# Patient Record
Sex: MALE | Race: WHITE | Employment: UNEMPLOYED
[De-identification: names, ages, dates, MRNs, and addresses within clinical notes are randomized per-mention and may not be internally consistent; named-entity substitution may affect disease eponyms.]

---

## 2017-03-02 ENCOUNTER — TELEPHONE (OUTPATIENT)
Dept: FAMILY MEDICINE CLINIC | Facility: CLINIC | Age: 47
End: 2017-03-02

## 2017-03-08 ENCOUNTER — TELEPHONE (OUTPATIENT)
Dept: FAMILY MEDICINE CLINIC | Facility: CLINIC | Age: 47
End: 2017-03-08

## 2017-04-27 ENCOUNTER — OFFICE VISIT (OUTPATIENT)
Dept: FAMILY MEDICINE CLINIC | Age: 47
End: 2017-04-27
Payer: COMMERCIAL

## 2017-04-27 VITALS
WEIGHT: 232 LBS | BODY MASS INDEX: 33.29 KG/M2 | HEART RATE: 91 BPM | DIASTOLIC BLOOD PRESSURE: 88 MMHG | SYSTOLIC BLOOD PRESSURE: 148 MMHG | OXYGEN SATURATION: 97 %

## 2017-04-27 DIAGNOSIS — E11.9 TYPE 2 DIABETES MELLITUS WITHOUT COMPLICATION, UNSPECIFIED LONG TERM INSULIN USE STATUS: Primary | ICD-10-CM

## 2017-04-27 DIAGNOSIS — F11.23 OPIOID DEPENDENCE WITH WITHDRAWAL (HCC): ICD-10-CM

## 2017-04-27 DIAGNOSIS — F41.9 ANXIETY AND DEPRESSION: ICD-10-CM

## 2017-04-27 DIAGNOSIS — F32.A ANXIETY AND DEPRESSION: ICD-10-CM

## 2017-04-27 LAB
CREATININE URINE: 100 MG/DL
HBA1C MFR BLD: 9.3 %
MICROALBUMIN/CREAT 24H UR: 2 MG/G{CREAT}

## 2017-04-27 PROCEDURE — 83036 HEMOGLOBIN GLYCOSYLATED A1C: CPT

## 2017-04-27 PROCEDURE — 99214 OFFICE O/P EST MOD 30 MIN: CPT

## 2017-04-27 RX ORDER — HYDROXYZINE HYDROCHLORIDE 25 MG/1
TABLET, FILM COATED ORAL
Qty: 60 TABLET | Refills: 1 | Status: SHIPPED | OUTPATIENT
Start: 2017-04-27 | End: 2021-01-22

## 2017-04-27 ASSESSMENT — ENCOUNTER SYMPTOMS
DIARRHEA: 0
WHEEZING: 0
EYE REDNESS: 0
EYE DISCHARGE: 0
VOMITING: 0
SORE THROAT: 0
COUGH: 0
ABDOMINAL PAIN: 0
RHINORRHEA: 0
NAUSEA: 0
SHORTNESS OF BREATH: 0

## 2017-05-12 ENCOUNTER — OFFICE VISIT (OUTPATIENT)
Dept: FAMILY MEDICINE CLINIC | Age: 47
End: 2017-05-12
Payer: COMMERCIAL

## 2017-05-12 VITALS
WEIGHT: 233 LBS | OXYGEN SATURATION: 96 % | HEIGHT: 72 IN | BODY MASS INDEX: 31.56 KG/M2 | TEMPERATURE: 97.7 F | HEART RATE: 78 BPM | SYSTOLIC BLOOD PRESSURE: 146 MMHG | DIASTOLIC BLOOD PRESSURE: 98 MMHG

## 2017-05-12 DIAGNOSIS — F11.23 OPIOID DEPENDENCE WITH WITHDRAWAL (HCC): ICD-10-CM

## 2017-05-12 DIAGNOSIS — F17.200 SMOKER: ICD-10-CM

## 2017-05-12 DIAGNOSIS — E11.9 TYPE 2 DIABETES MELLITUS WITHOUT COMPLICATION, UNSPECIFIED LONG TERM INSULIN USE STATUS: Primary | ICD-10-CM

## 2017-05-12 DIAGNOSIS — F32.A ANXIETY AND DEPRESSION: ICD-10-CM

## 2017-05-12 DIAGNOSIS — F41.9 ANXIETY AND DEPRESSION: ICD-10-CM

## 2017-05-12 PROCEDURE — 99213 OFFICE O/P EST LOW 20 MIN: CPT

## 2017-05-12 ASSESSMENT — PATIENT HEALTH QUESTIONNAIRE - PHQ9
2. FEELING DOWN, DEPRESSED OR HOPELESS: 0
1. LITTLE INTEREST OR PLEASURE IN DOING THINGS: 0
SUM OF ALL RESPONSES TO PHQ QUESTIONS 1-9: 0
SUM OF ALL RESPONSES TO PHQ9 QUESTIONS 1 & 2: 0

## 2017-05-12 ASSESSMENT — ENCOUNTER SYMPTOMS
ABDOMINAL PAIN: 0
EYE REDNESS: 0
EYE DISCHARGE: 0
RHINORRHEA: 0
COUGH: 0
SORE THROAT: 0
WHEEZING: 0
SHORTNESS OF BREATH: 0
NAUSEA: 0
DIARRHEA: 0
VOMITING: 0

## 2017-12-06 ENCOUNTER — TELEPHONE (OUTPATIENT)
Dept: OTHER | Facility: CLINIC | Age: 47
End: 2017-12-06

## 2021-01-22 ENCOUNTER — APPOINTMENT (OUTPATIENT)
Dept: CT IMAGING | Age: 51
DRG: 074 | End: 2021-01-22
Payer: COMMERCIAL

## 2021-01-22 ENCOUNTER — HOSPITAL ENCOUNTER (INPATIENT)
Age: 51
LOS: 1 days | Discharge: HOME | DRG: 074 | End: 2021-01-23
Attending: EMERGENCY MEDICINE | Admitting: FAMILY MEDICINE
Payer: COMMERCIAL

## 2021-01-22 DIAGNOSIS — R29.810 FACIAL DROOP: Primary | ICD-10-CM

## 2021-01-22 PROBLEM — F17.210 DEPENDENCE ON NICOTINE FROM CIGARETTES: Status: ACTIVE | Noted: 2021-01-22

## 2021-01-22 PROBLEM — I10 ESSENTIAL HYPERTENSION: Status: ACTIVE | Noted: 2021-01-22

## 2021-01-22 LAB
ABSOLUTE EOS #: 0.2 K/UL (ref 0–0.4)
ABSOLUTE IMMATURE GRANULOCYTE: ABNORMAL K/UL (ref 0–0.3)
ABSOLUTE LYMPH #: 3.1 K/UL (ref 1–4.8)
ABSOLUTE MONO #: 0.8 K/UL (ref 0.1–1.3)
ANION GAP SERPL CALCULATED.3IONS-SCNC: 15 MMOL/L (ref 9–17)
BASOPHILS # BLD: 1 % (ref 0–2)
BASOPHILS ABSOLUTE: 0.1 K/UL (ref 0–0.2)
BUN BLDV-MCNC: 14 MG/DL (ref 6–20)
BUN/CREAT BLD: ABNORMAL (ref 9–20)
CALCIUM SERPL-MCNC: 9 MG/DL (ref 8.6–10.4)
CHLORIDE BLD-SCNC: 97 MMOL/L (ref 98–107)
CO2: 23 MMOL/L (ref 20–31)
CREAT SERPL-MCNC: 0.59 MG/DL (ref 0.7–1.2)
DIFFERENTIAL TYPE: ABNORMAL
EOSINOPHILS RELATIVE PERCENT: 2 % (ref 0–4)
GFR AFRICAN AMERICAN: >60 ML/MIN
GFR NON-AFRICAN AMERICAN: >60 ML/MIN
GFR SERPL CREATININE-BSD FRML MDRD: ABNORMAL ML/MIN/{1.73_M2}
GFR SERPL CREATININE-BSD FRML MDRD: ABNORMAL ML/MIN/{1.73_M2}
GLUCOSE BLD-MCNC: 220 MG/DL (ref 75–110)
GLUCOSE BLD-MCNC: 274 MG/DL (ref 70–99)
GLUCOSE BLD-MCNC: 279 MG/DL (ref 75–110)
HCT VFR BLD CALC: 43.4 % (ref 41–53)
HEMOGLOBIN: 14.6 G/DL (ref 13.5–17.5)
IMMATURE GRANULOCYTES: ABNORMAL %
INR BLD: 0.9
LYMPHOCYTES # BLD: 32 % (ref 24–44)
MCH RBC QN AUTO: 30.7 PG (ref 26–34)
MCHC RBC AUTO-ENTMCNC: 33.6 G/DL (ref 31–37)
MCV RBC AUTO: 91.1 FL (ref 80–100)
MONOCYTES # BLD: 8 % (ref 1–7)
NRBC AUTOMATED: ABNORMAL PER 100 WBC
PARTIAL THROMBOPLASTIN TIME: 30.1 SEC (ref 24–36)
PDW BLD-RTO: 12.9 % (ref 11.5–14.9)
PLATELET # BLD: 253 K/UL (ref 150–450)
PLATELET ESTIMATE: ABNORMAL
PMV BLD AUTO: 7.8 FL (ref 6–12)
POTASSIUM SERPL-SCNC: 4.3 MMOL/L (ref 3.7–5.3)
PROTHROMBIN TIME: 11.7 SEC (ref 11.8–14.6)
RBC # BLD: 4.76 M/UL (ref 4.5–5.9)
RBC # BLD: ABNORMAL 10*6/UL
SEG NEUTROPHILS: 57 % (ref 36–66)
SEGMENTED NEUTROPHILS ABSOLUTE COUNT: 5.7 K/UL (ref 1.3–9.1)
SODIUM BLD-SCNC: 135 MMOL/L (ref 135–144)
TROPONIN INTERP: NORMAL
TROPONIN T: NORMAL NG/ML
TROPONIN, HIGH SENSITIVITY: 6 NG/L (ref 0–22)
WBC # BLD: 9.9 K/UL (ref 3.5–11)
WBC # BLD: ABNORMAL 10*3/UL

## 2021-01-22 PROCEDURE — 83036 HEMOGLOBIN GLYCOSYLATED A1C: CPT

## 2021-01-22 PROCEDURE — 36415 COLL VENOUS BLD VENIPUNCTURE: CPT

## 2021-01-22 PROCEDURE — 6360000002 HC RX W HCPCS: Performed by: STUDENT IN AN ORGANIZED HEALTH CARE EDUCATION/TRAINING PROGRAM

## 2021-01-22 PROCEDURE — 70498 CT ANGIOGRAPHY NECK: CPT

## 2021-01-22 PROCEDURE — 80048 BASIC METABOLIC PNL TOTAL CA: CPT

## 2021-01-22 PROCEDURE — 85025 COMPLETE CBC W/AUTO DIFF WBC: CPT

## 2021-01-22 PROCEDURE — 85730 THROMBOPLASTIN TIME PARTIAL: CPT

## 2021-01-22 PROCEDURE — 2580000003 HC RX 258: Performed by: STUDENT IN AN ORGANIZED HEALTH CARE EDUCATION/TRAINING PROGRAM

## 2021-01-22 PROCEDURE — 99285 EMERGENCY DEPT VISIT HI MDM: CPT

## 2021-01-22 PROCEDURE — 6370000000 HC RX 637 (ALT 250 FOR IP): Performed by: STUDENT IN AN ORGANIZED HEALTH CARE EDUCATION/TRAINING PROGRAM

## 2021-01-22 PROCEDURE — 2580000003 HC RX 258: Performed by: EMERGENCY MEDICINE

## 2021-01-22 PROCEDURE — 85610 PROTHROMBIN TIME: CPT

## 2021-01-22 PROCEDURE — 70450 CT HEAD/BRAIN W/O DYE: CPT

## 2021-01-22 PROCEDURE — 80307 DRUG TEST PRSMV CHEM ANLYZR: CPT

## 2021-01-22 PROCEDURE — 84484 ASSAY OF TROPONIN QUANT: CPT

## 2021-01-22 PROCEDURE — 93005 ELECTROCARDIOGRAM TRACING: CPT | Performed by: STUDENT IN AN ORGANIZED HEALTH CARE EDUCATION/TRAINING PROGRAM

## 2021-01-22 PROCEDURE — 6360000004 HC RX CONTRAST MEDICATION: Performed by: EMERGENCY MEDICINE

## 2021-01-22 PROCEDURE — 82947 ASSAY GLUCOSE BLOOD QUANT: CPT

## 2021-01-22 PROCEDURE — 2060000000 HC ICU INTERMEDIATE R&B

## 2021-01-22 RX ORDER — ONDANSETRON 2 MG/ML
4 INJECTION INTRAMUSCULAR; INTRAVENOUS EVERY 6 HOURS PRN
Status: DISCONTINUED | OUTPATIENT
Start: 2021-01-22 | End: 2021-01-23 | Stop reason: HOSPADM

## 2021-01-22 RX ORDER — ACETAMINOPHEN 325 MG/1
650 TABLET ORAL EVERY 6 HOURS PRN
Status: DISCONTINUED | OUTPATIENT
Start: 2021-01-22 | End: 2021-01-23 | Stop reason: HOSPADM

## 2021-01-22 RX ORDER — CLOPIDOGREL BISULFATE 75 MG/1
75 TABLET ORAL ONCE
Status: COMPLETED | OUTPATIENT
Start: 2021-01-22 | End: 2021-01-22

## 2021-01-22 RX ORDER — DEXTROSE MONOHYDRATE 25 G/50ML
12.5 INJECTION, SOLUTION INTRAVENOUS PRN
Status: DISCONTINUED | OUTPATIENT
Start: 2021-01-22 | End: 2021-01-23 | Stop reason: HOSPADM

## 2021-01-22 RX ORDER — METHADONE HYDROCHLORIDE 10 MG/5ML
120 SOLUTION ORAL DAILY
COMMUNITY

## 2021-01-22 RX ORDER — ACETAMINOPHEN 325 MG/1
650 TABLET ORAL EVERY 4 HOURS PRN
Status: DISCONTINUED | OUTPATIENT
Start: 2021-01-22 | End: 2021-01-23 | Stop reason: HOSPADM

## 2021-01-22 RX ORDER — DEXTROSE MONOHYDRATE 50 MG/ML
100 INJECTION, SOLUTION INTRAVENOUS PRN
Status: DISCONTINUED | OUTPATIENT
Start: 2021-01-22 | End: 2021-01-23 | Stop reason: HOSPADM

## 2021-01-22 RX ORDER — ASPIRIN 81 MG/1
81 TABLET ORAL DAILY
Status: DISCONTINUED | OUTPATIENT
Start: 2021-01-23 | End: 2021-01-23 | Stop reason: HOSPADM

## 2021-01-22 RX ORDER — SODIUM CHLORIDE 0.9 % (FLUSH) 0.9 %
10 SYRINGE (ML) INJECTION PRN
Status: DISCONTINUED | OUTPATIENT
Start: 2021-01-22 | End: 2021-01-23 | Stop reason: HOSPADM

## 2021-01-22 RX ORDER — ACETAMINOPHEN 650 MG/1
650 SUPPOSITORY RECTAL EVERY 6 HOURS PRN
Status: DISCONTINUED | OUTPATIENT
Start: 2021-01-22 | End: 2021-01-23 | Stop reason: HOSPADM

## 2021-01-22 RX ORDER — NICOTINE POLACRILEX 4 MG
15 LOZENGE BUCCAL PRN
Status: DISCONTINUED | OUTPATIENT
Start: 2021-01-22 | End: 2021-01-23 | Stop reason: HOSPADM

## 2021-01-22 RX ORDER — SODIUM CHLORIDE 0.9 % (FLUSH) 0.9 %
10 SYRINGE (ML) INJECTION EVERY 12 HOURS SCHEDULED
Status: DISCONTINUED | OUTPATIENT
Start: 2021-01-22 | End: 2021-01-23 | Stop reason: HOSPADM

## 2021-01-22 RX ORDER — PROMETHAZINE HYDROCHLORIDE 25 MG/1
12.5 TABLET ORAL EVERY 6 HOURS PRN
Status: DISCONTINUED | OUTPATIENT
Start: 2021-01-22 | End: 2021-01-23 | Stop reason: HOSPADM

## 2021-01-22 RX ORDER — ASPIRIN 81 MG/1
81 TABLET, CHEWABLE ORAL DAILY
Status: DISCONTINUED | OUTPATIENT
Start: 2021-01-23 | End: 2021-01-22 | Stop reason: SDUPTHER

## 2021-01-22 RX ORDER — SODIUM CHLORIDE 0.9 % (FLUSH) 0.9 %
10 SYRINGE (ML) INJECTION ONCE
Status: COMPLETED | OUTPATIENT
Start: 2021-01-22 | End: 2021-01-22

## 2021-01-22 RX ORDER — CLOPIDOGREL BISULFATE 75 MG/1
300 TABLET ORAL ONCE
Status: COMPLETED | OUTPATIENT
Start: 2021-01-22 | End: 2021-01-22

## 2021-01-22 RX ORDER — 0.9 % SODIUM CHLORIDE 0.9 %
80 INTRAVENOUS SOLUTION INTRAVENOUS ONCE
Status: COMPLETED | OUTPATIENT
Start: 2021-01-22 | End: 2021-01-22

## 2021-01-22 RX ORDER — POLYETHYLENE GLYCOL 3350 17 G/17G
17 POWDER, FOR SOLUTION ORAL DAILY PRN
Status: DISCONTINUED | OUTPATIENT
Start: 2021-01-22 | End: 2021-01-23 | Stop reason: HOSPADM

## 2021-01-22 RX ORDER — AMLODIPINE BESYLATE 5 MG/1
5 TABLET ORAL DAILY
COMMUNITY

## 2021-01-22 RX ORDER — NICOTINE 21 MG/24HR
1 PATCH, TRANSDERMAL 24 HOURS TRANSDERMAL DAILY
Status: DISCONTINUED | OUTPATIENT
Start: 2021-01-22 | End: 2021-01-23 | Stop reason: HOSPADM

## 2021-01-22 RX ORDER — INSULIN GLARGINE 100 [IU]/ML
50 INJECTION, SOLUTION SUBCUTANEOUS 2 TIMES DAILY
Status: DISCONTINUED | OUTPATIENT
Start: 2021-01-22 | End: 2021-01-23 | Stop reason: HOSPADM

## 2021-01-22 RX ORDER — ASPIRIN 81 MG/1
324 TABLET, CHEWABLE ORAL ONCE
Status: COMPLETED | OUTPATIENT
Start: 2021-01-22 | End: 2021-01-22

## 2021-01-22 RX ORDER — ATORVASTATIN CALCIUM 40 MG/1
40 TABLET, FILM COATED ORAL NIGHTLY
Status: DISCONTINUED | OUTPATIENT
Start: 2021-01-22 | End: 2021-01-22

## 2021-01-22 RX ORDER — ATORVASTATIN CALCIUM 80 MG/1
80 TABLET, FILM COATED ORAL NIGHTLY
Status: DISCONTINUED | OUTPATIENT
Start: 2021-01-22 | End: 2021-01-23 | Stop reason: HOSPADM

## 2021-01-22 RX ADMIN — SODIUM CHLORIDE 80 ML: 9 INJECTION, SOLUTION INTRAVENOUS at 18:41

## 2021-01-22 RX ADMIN — ATORVASTATIN CALCIUM 80 MG: 80 TABLET, FILM COATED ORAL at 23:10

## 2021-01-22 RX ADMIN — Medication 10 ML: at 18:41

## 2021-01-22 RX ADMIN — INSULIN LISPRO 2 UNITS: 100 INJECTION, SOLUTION INTRAVENOUS; SUBCUTANEOUS at 23:11

## 2021-01-22 RX ADMIN — Medication 10 ML: at 23:24

## 2021-01-22 RX ADMIN — CLOPIDOGREL BISULFATE 75 MG: 75 TABLET ORAL at 23:10

## 2021-01-22 RX ADMIN — ENOXAPARIN SODIUM 30 MG: 30 INJECTION SUBCUTANEOUS at 23:10

## 2021-01-22 RX ADMIN — INSULIN GLARGINE 50 UNITS: 100 INJECTION, SOLUTION SUBCUTANEOUS at 23:11

## 2021-01-22 RX ADMIN — ASPIRIN 324 MG: 81 TABLET, CHEWABLE ORAL at 20:24

## 2021-01-22 RX ADMIN — CLOPIDOGREL BISULFATE 300 MG: 75 TABLET ORAL at 20:25

## 2021-01-22 RX ADMIN — IOPAMIDOL 75 ML: 755 INJECTION, SOLUTION INTRAVENOUS at 18:41

## 2021-01-22 ASSESSMENT — ENCOUNTER SYMPTOMS
WHEEZING: 0
CONSTIPATION: 0
BACK PAIN: 0
NAUSEA: 0
ABDOMINAL PAIN: 0
COUGH: 0
SHORTNESS OF BREATH: 0
PHOTOPHOBIA: 0
VOMITING: 0
DIARRHEA: 0

## 2021-01-22 NOTE — Clinical Note
Patient Class: Inpatient [101]   REQUIRED: Diagnosis: Facial droop [745235]   Estimated Length of Stay: Estimated stay of more than 2 midnights   Admitting Provider: Kaitlin Winter [[id number]]   Telemetry Bed Required?: Yes

## 2021-01-22 NOTE — ED PROVIDER NOTES
EMERGENCY DEPARTMENT ENCOUNTER   ATTENDING ATTESTATION     Pt Name: Wally Naranjo  MRN: [id number]  [place] 1970  Date of evaluation: 1/22/21       Wally Naranjo is a 54 y.o. male who presents with Facial Droop      MDM: 51-year-old male presents complaint of right-sided facial droop. Patient states symptoms started approximately 4 PM yesterday, states he noticed a strange feeling in the right side of his lip and noticed that he had some drooping of the right side of his face. Patient states that the symptoms have continued and he presents for evaluation today. Patient denies any other numbness, tingling or weakness in the upper or lower extremities, denies any recent injuries or recent head trauma, denies symptoms like this before. On initial exam patient does have right-sided facial droop with sparing of the forehead, remaining neuro exam was unremarkable, initial NIH of 2 for partial right-sided facial droop, will obtain stroke work-up    Reviewed unremarkable, CTs were negative for acute process    Given patient's presenting symptoms, will admit for further stroke work-up    Discussed results with patient and patient's mother, both are agreeable to admission    Spoke with Dr. Ileana Oliveira who accepts admission with Neurology consult. Patient demonstrates understanding and agreement with the plan, was given the opportunity to ask questions, and these questions were answered to the best of the provided information at this time. VS stable for transfer. This dictation was prepared using [employer] [facility] Digital [employer] voice recognition software. As a result, errors may have occurred. When identified, these errors have been corrected.  While every attempt is made to correct errors in dictation, errors may still exist.         Vitals:   Vitals:    01/22/21 1644 01/22/21 1715 01/22/21 1730 01/22/21 1746   BP: (!) 173/95 (!) 160/99 (!) 153/99    Pulse: 86 76 72    Resp: 18 9 11    Temp: 98 °F (36.7 °C)      TempSrc: Oral      SpO2: 95% 93% 94%    Weight:    230 lb (104.3 kg)         I personally evaluated and examined the patient in conjunction with the resident and agree with the assessment, treatment plan, and disposition of the patient as recorded by the resident. I performed a history and physical examination of the patient and discussed management with the resident. I reviewed the residents note and agree with the documented findings and plan of care. Any areas of disagreement are noted on the chart. I was personally present for the key portions of any procedures. I have documented in the chart those procedures where I was not present during the key portions. I have personally reviewed all images and agree with the resident's interpretation. I have reviewed the emergency nurses triage note. I agree with the chief complaint, past medical history, past surgical history, allergies, medications, social and family history as documented unless otherwise noted.     Nely Driscoll DO  Attending Emergency Physician            Nely Driscoll DO  01/22/21 1448

## 2021-01-22 NOTE — PROGRESS NOTES
Medication History completed:    New medications: amlodipine, Lantus, sertraline    Medications discontinued: hydroxyzine, liraglutide, metformin    Changes to dosing:  Methadone changed to 120 mg daily    Stated allergies: NKDA    Other pertinent information: verified medications with [facility] pharmacy. Patient states that he is taking 120 mg of methadone daily. Was unable to verify methadone dose with [facility] on [address]. Will need to verify tomorrow when they reopen.     Thank you,  Prince Carver, PharmD candidate [id number]

## 2021-01-22 NOTE — ED TRIAGE NOTES
Mode of arrival (squad #, walk in, police, etc) : Walk In        Chief complaint(s): Facial droop         Arrival Note (brief scenario, treatment PTA, etc). : Pt arrives to ED c/o facial droop. Patient states that yesterday around 1600 he noticed the right side of his mouth was drooping. C= \"Have you ever felt that you should Cut down on your drinking? \"  No  A= \"Have people Annoyed you by criticizing your drinking? \"  No  G= \"Have you ever felt bad or Guilty about your drinking? \"  No  E= \"Have you ever had a drink as an Eye-opener first thing in the morning to steady your nerves or to help a hangover? \"  No      Deferred []      Reason for deferring: N/A    *If yes to two or more: probable alcohol abuse. *

## 2021-01-22 NOTE — ED PROVIDER NOTES
[facility] CARE  Emergency Department Encounter  EmergencyMedicine Resident     Pt Name:Buster Carrillo  MRN: [id number]  [place] 1970  Date of evaluation: 1/22/21  PCP:  Carla Brown       Chief Complaint   Patient presents with    Facial Droop       HISTORY OF PRESENT ILLNESS  (Location/Symptom, Timing/Onset, Context/Setting, Quality, Duration, Modifying Factors, Severity.)    This patient was evaluated in the Emergency Department for symptoms described in the history of present illness. He/she was evaluated in the context of the global COVID-19 pandemic, which necessitated consideration that the patient might be at risk for infection with the SARS-CoV-2 virus that causes COVID-19. Institutional protocols and algorithms that pertain to the evaluation of patients at risk for COVID-19 are in a state of rapid change based on information released by regulatory bodies including the CDC and federal and state organizations. These policies and algorithms were followed during the patient's care in the ED. Shima Eduardo is a 54 y.o. male who presents to the ED today with concerns for stroke due to right-sided facial droop and numbness. Patient states that he noticed symptoms yesterday at approximately 4 PM.  Did not seek medical attention at that time because he wanted to get his methadone this morning at the clinic. He said persistent right-sided facial droop with right-sided numbness. Also noting intermittent blurred vision. Feels that he may have been following slightly to the left. Denies any numbness, tingling, weakness in upper or lower extremities. Denies chest pain, shortness of breath, fevers, chills, sweats, neck pain. History of IV drug use on methadone. Significant family history for CAD and CVAs. Past medical history includes diabetes. Blood glucose upon arrival within normal limits.     PAST MEDICAL / SURGICAL / SOCIAL / FAMILY HISTORY      has a past medical history of Acute bronchitis with bronchospasm, Acute periodontitis, Adhesive capsulitis of right shoulder, Anxiety, Blurry vision, Carbuncle of neck, Caries, Chest pain, Constipation, Cough, Diastasis of muscle, Difficulty breathing, Drug dependence ([facility]), Fatigue, Glycosuria, Hyperlipidemia, Hypothyroid, Opioid abuse, episodic (HCC), Opioid dependence (HCC), Opioid withdrawal ([facility]), Osteoarthritis, Otitis media, Pain, joint, shoulder, Periodontal abscess, Pharyngitis, Pneumonia, Polydipsia, Rhinitis, Sciatica, Shoulder fracture, right, Tobacco abuse counseling, Tooth pain, Trapezius muscle strain, Type 2 diabetes mellitus, uncontrolled ([facility] [address] and Wheezing. has no past surgical history on file.     Social History     Socioeconomic History    Marital status:      Spouse name: Not on file    Number of children: Not on file    Years of education: Not on file    Highest education level: Not on file   Occupational History    Not on file   Social Needs    Financial resource strain: Not on file    Food insecurity     Worry: Not on file     Inability: Not on file    Transportation needs     Medical: Not on file     Non-medical: Not on file   Tobacco Use    Smoking status: Current Every Day Smoker     Packs/day: 1.50     Years: 25.00     Pack years: 37.50    Smokeless tobacco: Never Used   Substance and Sexual Activity    Alcohol use: Not on file    Drug use: Not on file    Sexual activity: Not on file   Lifestyle    Physical activity     Days per week: Not on file     Minutes per session: Not on file    Stress: Not on file   Relationships    Social connections     Talks on phone: Not on file     Gets together: Not on file     Attends [employer] service: Not on file     Active member of club or organization: Not on file     Attends meetings of clubs or organizations: Not on file     Relationship status: Not on file    Intimate partner violence     Fear of current or ex partner: Not on file Emotionally abused: Not on file     Physically abused: Not on file     Forced sexual activity: Not on file   Other Topics Concern    Not on file   Social History Narrative    Not on file       History reviewed. No pertinent family history. Allergies:  Patient has no known allergies. Home Medications:  Prior to Admission medications    Medication Sig Start Date End Date Taking? Authorizing Provider   sertraline (ZOLOFT) 50 MG tablet Take 50 mg by mouth daily   Yes Historical Provider, MD   insulin glargine (LANTUS;BASAGLAR) 100 UNIT/ML injection pen Inject 80 Units into the skin 2 times daily   Yes Historical Provider, MD   methadone 10 MG/5ML solution Take 120 mg by mouth daily. Yes Historical Provider, MD   amLODIPine (NORVASC) 5 MG tablet Take 5 mg by mouth daily    Historical Provider, MD   Insulin Pen Needle 32G X 4 MM MISC 1 each by Does not apply route daily 4/8/16   Jaya Parmar MD   glucose blood VI test strips (ASCENSIA AUTODISC VI;ONE TOUCH ULTRA TEST VI) strip 1 each by In Vitro route 2 times daily As needed. Historical Provider, MD   ibuprofen (ADVIL;MOTRIN) 800 MG tablet Take 800 mg by mouth 3 times daily as needed for Pain    Historical Provider, MD   Lancets MISC 1 each by Does not apply route 2 times daily    Historical Provider, MD       REVIEW OF SYSTEMS    (2-9 systems for level 4, 10 or more for level 5)      Review of Systems   Constitutional: Negative for chills and fever. HENT: Negative for congestion. Eyes: Positive for visual disturbance. Negative for photophobia. Respiratory: Negative for cough, shortness of breath and wheezing. Cardiovascular: Negative for chest pain and leg swelling. Gastrointestinal: Negative for abdominal pain, constipation, diarrhea, nausea and vomiting. Genitourinary: Negative for dysuria and hematuria. Musculoskeletal: Negative for back pain and myalgias. Skin: Negative for rash.    Neurological: Positive for facial asymmetry and numbness. Negative for dizziness, weakness and headaches. PHYSICAL EXAM   (up to 7 for level 4, 8 or more for level 5)      INITIAL VITALS:   BP (!) 173/90   Pulse 64   Temp 98.1 °F (36.7 °C) (Oral)   Resp 14   Ht 6' (1.829 m)   Wt 241 lb 6.5 oz (109.5 kg)   SpO2 93%   BMI 32.74 kg/m²     Physical Exam  Vitals signs reviewed. Constitutional:       General: He is not in acute distress. Appearance: He is well-developed. He is ill-appearing. He is not toxic-appearing. HENT:      Head: Normocephalic and atraumatic. Nose: Nose normal.      Mouth/Throat:      Mouth: Mucous membranes are dry. Eyes:      Extraocular Movements: Extraocular movements intact. Conjunctiva/sclera: Conjunctivae normal.      Pupils: Pupils are equal, round, and reactive to light. Neck:      Musculoskeletal: Normal range of motion. No muscular tenderness. Trachea: No tracheal deviation. Cardiovascular:      Rate and Rhythm: Normal rate and regular rhythm. Heart sounds: Normal heart sounds. Pulmonary:      Effort: Pulmonary effort is normal. No respiratory distress. Breath sounds: Normal breath sounds. No stridor. No wheezing, rhonchi or rales. Abdominal:      General: Bowel sounds are normal. There is no distension. Palpations: Abdomen is soft. Tenderness: There is no abdominal tenderness. There is no guarding or rebound. Musculoskeletal:         General: No swelling or deformity. Skin:     General: Skin is warm and dry. Capillary Refill: Capillary refill takes less than 2 seconds. Neurological:      Mental Status: He is alert and oriented to person, place, and time. Comments: Right-sided facial droop sparing forehead. Numbness to right lower face. Otherwise cranial nerves are intact. Visual fields are intact. Normal sensation to light touch in all extremities. 5/5 strength. Normal heel-to-shin and finger-to-nose. No pronator drift.        NIH of 2.         DIFFERENTIAL  DIAGNOSIS     PLAN (LABS / IMAGING / EKG):  Orders Placed This Encounter   Procedures    CT Head WO Contrast    CTA HEAD NECK W CONTRAST    MRI BRAIN WO CONTRAST    CBC Auto Differential    Basic Metabolic Panel w/ Reflex to MG    Troponin    Protime-INR    APTT    URINE DRUG SCREEN    Lipid Profile    Hemoglobin A1C    CBC auto differential    Comprehensive Metabolic Panel w/ Reflex to MG    Lipid Panel    Protime-INR    APTT    DIET CARB CONTROL;    Vital signs per unit routine    Up as tolerated    Nursing swallow assessment    Vital signs per unit routine    Notify physician    Notify physician    Up as tolerated    Telemetry Monitoring    HYPOGLYCEMIA TREATMENT: blood glucose less than 50 mg/dL and patient  ALERT and TOLERATING PO    HYPOGLYCEMIA TREATMENT: blood glucose less than 70 mg/dL and patient ALERT and TOLERATING PO    HYPOGLYCEMIA TREATMENT: blood glucose less than 70 mg/dL and patient NOT ALERT or NPO    Full Code    Inpatient consult to Neurology    Inpatient consult to Primary Care Provider    OT eval and treat    PT evaluation and treat    Initiate Oxygen Therapy Protocol    SLP eval and treat    POC Glucose Fingerstick    POCT glucose    POCT Glucose    POC Glucose Fingerstick    EKG 12 Lead    ECHO Complete 2D W Doppler W Color    Echocardiogram Limited 2D    PATIENT STATUS (FROM ED OR OR/PROCEDURAL) Inpatient       MEDICATIONS ORDERED:  Orders Placed This Encounter   Medications    sodium chloride flush 0.9 % injection 10 mL    0.9 % sodium chloride bolus    iopamidol (ISOVUE-370) 76 % injection 75 mL    insulin glargine (LANTUS) injection vial 50 Units    sertraline (ZOLOFT) tablet 50 mg    DISCONTD: aspirin chewable tablet 81 mg    DISCONTD: atorvastatin (LIPITOR) tablet 40 mg    sodium chloride flush 0.9 % injection 10 mL    sodium chloride flush 0.9 % injection 10 mL    enoxaparin (LOVENOX) injection 30 mg    OR Linked Order Group     promethazine (PHENERGAN) tablet 12.5 mg     ondansetron (ZOFRAN) injection 4 mg    polyethylene glycol (GLYCOLAX) packet 17 g    OR Linked Order Group     acetaminophen (TYLENOL) tablet 650 mg     acetaminophen (TYLENOL) suppository 650 mg    insulin lispro (HUMALOG) injection vial 0-12 Units    insulin lispro (HUMALOG) injection vial 0-6 Units    sodium chloride flush 0.9 % injection 10 mL    sodium chloride flush 0.9 % injection 10 mL    DISCONTD: enoxaparin (LOVENOX) injection 40 mg    acetaminophen (TYLENOL) tablet 650 mg    nicotine (NICODERM CQ) 14 MG/24HR 1 patch    atorvastatin (LIPITOR) tablet 80 mg    clopidogrel (PLAVIX) tablet 75 mg    aspirin EC tablet 81 mg    aspirin chewable tablet 324 mg    clopidogrel (PLAVIX) tablet 300 mg    glucose (GLUTOSE) 40 % oral gel 15 g    dextrose 50 % IV solution    glucagon (rDNA) injection 1 mg    dextrose 5 % solution         DIAGNOSTIC RESULTS / EMERGENCY DEPARTMENT COURSE / MDM     Results for orders placed or performed during the hospital encounter of 01/22/21   CBC Auto Differential   Result Value Ref Range    WBC 9.9 3.5 - 11.0 k/uL    RBC 4.76 4.5 - 5.9 m/uL    Hemoglobin 14.6 13.5 - 17.5 g/dL    Hematocrit 43.4 41 - 53 %    MCV 91.1 80 - 100 fL    MCH 30.7 26 - 34 pg    MCHC 33.6 31 - 37 g/dL    RDW 12.9 11.5 - 14.9 %    Platelets 086 013 - 994 k/uL    MPV 7.8 6.0 - 12.0 fL    NRBC Automated NOT REPORTED per 100 WBC    Differential Type NOT REPORTED     Immature Granulocytes NOT REPORTED 0 %    Absolute Immature Granulocyte NOT REPORTED 0.00 - 0.30 k/uL    WBC Morphology NOT REPORTED     RBC Morphology NOT REPORTED     Platelet Estimate NOT REPORTED     Seg Neutrophils 57 36 - 66 %    Lymphocytes 32 24 - 44 %    Monocytes 8 (H) 1 - 7 %    Eosinophils % 2 0 - 4 %    Basophils 1 0 - 2 %    Segs Absolute 5.70 1.3 - 9.1 k/uL    Absolute Lymph # 3.10 1.0 - 4.8 k/uL    Absolute Mono # 0.80 0.1 - 1.3 k/uL    Absolute Eos # 0. 20 0.0 - 0.4 k/uL    Basophils Absolute 0.10 0.0 - 0.2 k/uL   Basic Metabolic Panel w/ Reflex to MG   Result Value Ref Range    Glucose 274 (H) 70 - 99 mg/dL    BUN 14 6 - 20 mg/dL    CREATININE 0.59 (L) 0.70 - 1.20 mg/dL    Bun/Cre Ratio NOT REPORTED 9 - 20    Calcium 9.0 8.6 - 10.4 mg/dL    Sodium 135 135 - 144 mmol/L    Potassium 4.3 3.7 - 5.3 mmol/L    Chloride 97 (L) 98 - 107 mmol/L    CO2 23 20 - 31 mmol/L    Anion Gap 15 9 - 17 mmol/L    GFR Non-African American >60 >60 mL/min    GFR African American >60 >60 mL/min    GFR Comment          GFR Staging NOT REPORTED    Troponin   Result Value Ref Range    Troponin, High Sensitivity 6 0 - 22 ng/L    Troponin T NOT REPORTED <0.03 ng/mL    Troponin Interp NOT REPORTED    Protime-INR   Result Value Ref Range    Protime 11.7 (L) 11.8 - 14.6 sec    INR 0.9    APTT   Result Value Ref Range    PTT 30.1 24.0 - 36.0 sec   URINE DRUG SCREEN   Result Value Ref Range    Amphetamine Screen, Ur NEGATIVE NEGATIVE    Barbiturate Screen, Ur NEGATIVE NEGATIVE    Benzodiazepine Screen, Urine NEGATIVE NEGATIVE    Cocaine Metabolite, Urine NEGATIVE NEGATIVE    Methadone Screen, Urine POSITIVE (A) NEGATIVE    Opiates, Urine NEGATIVE NEGATIVE    Phencyclidine, Urine NEGATIVE NEGATIVE    Propoxyphene, Urine NOT REPORTED NEGATIVE    Cannabinoid Scrn, Ur NEGATIVE NEGATIVE    Oxycodone Screen, Ur NEGATIVE NEGATIVE    Methamphetamine, Urine NOT REPORTED NEGATIVE    Tricyclic Antidepressants, Urine NOT REPORTED NEGATIVE    MDMA, Urine NOT REPORTED NEGATIVE    Buprenorphine Urine NOT REPORTED NEGATIVE    Test Information       Assay provides medical screening only. The absence of expected drug(s) and/or metabolite(s) may indicate diluted or adulterated urine, limitations of testing or timing of collection.    POC Glucose Fingerstick   Result Value Ref Range    POC Glucose 279 (H) 75 - 110 mg/dL   POC Glucose Fingerstick   Result Value Ref Range    POC Glucose 220 (H) 75 - 110 mg/dL   EKG 12 Lead   Result Value Ref Range    Ventricular Rate 74 BPM    Atrial Rate 74 BPM    P-R Interval 142 ms    QRS Duration 84 ms    Q-T Interval 422 ms    QTc Calculation (Bazett) 468 ms    P Axis 53 degrees    T Axis 48 degrees         RADIOLOGY:  CTA HEAD NECK W CONTRAST   Final Result   1. No acute intracranial abnormality. 2. Unremarkable CTA of the head and neck. Findings were discussed with Richard Mariee at 6:54 pm on 1/22/2021. CT Head WO Contrast   Final Result   1. No acute intracranial abnormality. 2. Unremarkable CTA of the head and neck. Findings were discussed with Richard Mariee at 6:54 pm on 1/22/2021. MRI BRAIN WO CONTRAST    (Results Pending)        EKG  EKG Interpretation    Interpreted by me    Rhythm: normal sinus   Rate: normal  Axis: normal  Ectopy: none  Conduction: normal  ST Segments: no acute change  T Waves: T wave flattening in 1 and aVL  Q Waves: none    Clinical Impression: no acute changes and normal EKG    All EKG's are interpreted by the Emergency Department Physician who either signs or Co-signs this chart in the absence of a cardiologist.    IMPRESSION/MDM/EMERGENCY DEPARTMENT COURSE:  Patient came to emergency department, HPI and physical exam were conducted. All nursing notes were reviewed. 77-year-old male present emergency department with complaints of right-sided facial droop numbness/tingling. Onset yesterday at 4 PM.  Denies any other deficits. No history of stroke in the past.  No headache. Does not take any blood thinners. No recent trauma. Patient was screened and has no clinical signs or symptoms of a CoVID-19 infection at this time. However, given current pandemic and atypical presentations, face mask, eye protection, surgical cap, and gloves were worn during examination. Patient was wearing surgical mask. Hypertensive 173/95 but vitals otherwise within normal limits. GCS 15. Patient appears ill but not acutely toxic.   He is alert and oriented answering questions appropriately. Heart regular rate and without murmur. Lungs are clear to auscultate but without wheezes rales or rhonchi. Abdomen soft, nontender nondistended. Normal peripheral pulses. Neuro exam is significant for right-sided facial droop with forehead and right-sided facial numbness. Differential includes stroke, atypical Bell's palsy, facial nerve palsy, arrhythmia. Patient is outside window for TPA or thrombectomy. Will obtain CT head, CTA head neck, basic labs, cardiac work-up, coags. Likely admission. ED Course as of Jan 23 0258 Fri Jan 22, 2021   1736 PTT: 30.1 [ZT]   1856 Spoke to [facility] radiology. CT head unremarkable. CTA neck with no evidence of large vessel occlusion. [ZT]   1921 Spoke to Dr. Nicole Schneider who has agreed to admit pt    [ZT]   2009 Spoke to Dr. Jose Luna, neurologist.  Recommending loading with aspirin and Plavix. Lipitor once daily. Echo and MRI tomorrow. A1c and lipid panel in the morning. [ZT]      ED Course User Index  [ZT] Luis M Fowler DO     Laboratory work-up in the emergency department grossly unremarkable. CT head negative. CTA head and neck negative with no evidence of large vessel occlusion. Admitted to PCP for further stroke work-up. Neurology consulted. Loaded with Lipitor and Plavix in the emergency department. Plan for echocardiogram and MRI tomorrow. Patient is agreeable with plan. All questions answered. PROCEDURES:  None    CONSULTS:  IP CONSULT TO NEUROLOGY  IP CONSULT TO PRIMARY CARE PROVIDER    CRITICAL CARE:  None    FINAL IMPRESSION      1. Facial droop          DISPOSITION / PLAN     DISPOSITION  01/22/2021 07:49:56 PM      PATIENT REFERRED TO:  No follow-up provider specified.     DISCHARGE MEDICATIONS:  Current Discharge Medication List          Luis M Fowler DO  Emergency Medicine Resident    (Please note that portions of thisnote were completed with a voice recognition program. Efforts were made to edit the dictations but occasionally words are mis-transcribed.)       Jonathan Nguyen DO  Resident  01/23/21 4012

## 2021-01-23 ENCOUNTER — APPOINTMENT (OUTPATIENT)
Dept: GENERAL RADIOLOGY | Age: 51
DRG: 074 | End: 2021-01-23
Payer: COMMERCIAL

## 2021-01-23 ENCOUNTER — APPOINTMENT (OUTPATIENT)
Dept: MRI IMAGING | Age: 51
DRG: 074 | End: 2021-01-23
Payer: COMMERCIAL

## 2021-01-23 VITALS
TEMPERATURE: 98.1 F | DIASTOLIC BLOOD PRESSURE: 90 MMHG | RESPIRATION RATE: 16 BRPM | WEIGHT: 241.4 LBS | OXYGEN SATURATION: 95 % | BODY MASS INDEX: 32.7 KG/M2 | HEART RATE: 59 BPM | SYSTOLIC BLOOD PRESSURE: 159 MMHG | HEIGHT: 72 IN

## 2021-01-23 PROBLEM — G51.0 BELL'S PALSY: Status: ACTIVE | Noted: 2021-01-23

## 2021-01-23 LAB
ABSOLUTE EOS #: 0.2 K/UL (ref 0–0.4)
ABSOLUTE IMMATURE GRANULOCYTE: NORMAL K/UL (ref 0–0.3)
ABSOLUTE LYMPH #: 3.5 K/UL (ref 1–4.8)
ABSOLUTE MONO #: 0.6 K/UL (ref 0.1–1.3)
ALBUMIN SERPL-MCNC: 3.9 G/DL (ref 3.5–5.2)
ALBUMIN/GLOBULIN RATIO: ABNORMAL (ref 1–2.5)
ALP BLD-CCNC: 88 U/L (ref 40–129)
ALT SERPL-CCNC: 20 U/L (ref 5–41)
AMPHETAMINE SCREEN URINE: NEGATIVE
ANION GAP SERPL CALCULATED.3IONS-SCNC: 9 MMOL/L (ref 9–17)
AST SERPL-CCNC: 16 U/L
BARBITURATE SCREEN URINE: NEGATIVE
BASOPHILS # BLD: 1 % (ref 0–2)
BASOPHILS ABSOLUTE: 0.1 K/UL (ref 0–0.2)
BENZODIAZEPINE SCREEN, URINE: NEGATIVE
BILIRUB SERPL-MCNC: 0.43 MG/DL (ref 0.3–1.2)
BUN BLDV-MCNC: 10 MG/DL (ref 6–20)
BUN/CREAT BLD: ABNORMAL (ref 9–20)
BUPRENORPHINE URINE: ABNORMAL
CALCIUM SERPL-MCNC: 9.2 MG/DL (ref 8.6–10.4)
CANNABINOID SCREEN URINE: NEGATIVE
CHLORIDE BLD-SCNC: 101 MMOL/L (ref 98–107)
CHOLESTEROL/HDL RATIO: 9.6
CHOLESTEROL: 277 MG/DL
CO2: 28 MMOL/L (ref 20–31)
COCAINE METABOLITE, URINE: NEGATIVE
CREAT SERPL-MCNC: 0.49 MG/DL (ref 0.7–1.2)
DIFFERENTIAL TYPE: NORMAL
EOSINOPHILS RELATIVE PERCENT: 2 % (ref 0–4)
GFR AFRICAN AMERICAN: >60 ML/MIN
GFR NON-AFRICAN AMERICAN: >60 ML/MIN
GFR SERPL CREATININE-BSD FRML MDRD: ABNORMAL ML/MIN/{1.73_M2}
GFR SERPL CREATININE-BSD FRML MDRD: ABNORMAL ML/MIN/{1.73_M2}
GLUCOSE BLD-MCNC: 169 MG/DL (ref 70–99)
GLUCOSE BLD-MCNC: 189 MG/DL (ref 75–110)
GLUCOSE BLD-MCNC: 293 MG/DL (ref 75–110)
GLUCOSE BLD-MCNC: 311 MG/DL (ref 75–110)
HCT VFR BLD CALC: 43.5 % (ref 41–53)
HDLC SERPL-MCNC: 29 MG/DL
HEMOGLOBIN: 15.4 G/DL (ref 13.5–17.5)
IMMATURE GRANULOCYTES: NORMAL %
INR BLD: 1
LDL CHOLESTEROL DIRECT: 65 MG/DL
LDL CHOLESTEROL: ABNORMAL MG/DL (ref 0–130)
LV EF: 58 %
LVEF MODALITY: NORMAL
LYMPHOCYTES # BLD: 38 % (ref 24–44)
MCH RBC QN AUTO: 32.1 PG (ref 26–34)
MCHC RBC AUTO-ENTMCNC: 35.3 G/DL (ref 31–37)
MCV RBC AUTO: 90.9 FL (ref 80–100)
MDMA URINE: ABNORMAL
METHADONE SCREEN, URINE: POSITIVE
METHAMPHETAMINE, URINE: ABNORMAL
MONOCYTES # BLD: 7 % (ref 1–7)
NRBC AUTOMATED: NORMAL PER 100 WBC
OPIATES, URINE: NEGATIVE
OXYCODONE SCREEN URINE: NEGATIVE
PARTIAL THROMBOPLASTIN TIME: 28.7 SEC (ref 24–36)
PDW BLD-RTO: 13.2 % (ref 11.5–14.9)
PHENCYCLIDINE, URINE: NEGATIVE
PLATELET # BLD: 216 K/UL (ref 150–450)
PLATELET ESTIMATE: NORMAL
PMV BLD AUTO: 7.4 FL (ref 6–12)
POTASSIUM SERPL-SCNC: 4.1 MMOL/L (ref 3.7–5.3)
PROPOXYPHENE, URINE: ABNORMAL
PROTHROMBIN TIME: 12.8 SEC (ref 11.8–14.6)
RBC # BLD: 4.79 M/UL (ref 4.5–5.9)
RBC # BLD: NORMAL 10*6/UL
SEG NEUTROPHILS: 52 % (ref 36–66)
SEGMENTED NEUTROPHILS ABSOLUTE COUNT: 5 K/UL (ref 1.3–9.1)
SODIUM BLD-SCNC: 138 MMOL/L (ref 135–144)
TEST INFORMATION: ABNORMAL
TOTAL PROTEIN: 7.3 G/DL (ref 6.4–8.3)
TRICYCLIC ANTIDEPRESSANTS, UR: ABNORMAL
TRIGL SERPL-MCNC: 1119 MG/DL
VLDLC SERPL CALC-MCNC: ABNORMAL MG/DL (ref 1–30)
WBC # BLD: 9.4 K/UL (ref 3.5–11)
WBC # BLD: NORMAL 10*3/UL

## 2021-01-23 PROCEDURE — 6370000000 HC RX 637 (ALT 250 FOR IP): Performed by: FAMILY MEDICINE

## 2021-01-23 PROCEDURE — 2580000003 HC RX 258: Performed by: STUDENT IN AN ORGANIZED HEALTH CARE EDUCATION/TRAINING PROGRAM

## 2021-01-23 PROCEDURE — 92610 EVALUATE SWALLOWING FUNCTION: CPT

## 2021-01-23 PROCEDURE — 6360000002 HC RX W HCPCS: Performed by: STUDENT IN AN ORGANIZED HEALTH CARE EDUCATION/TRAINING PROGRAM

## 2021-01-23 PROCEDURE — 92523 SPEECH SOUND LANG COMPREHEN: CPT

## 2021-01-23 PROCEDURE — 36415 COLL VENOUS BLD VENIPUNCTURE: CPT

## 2021-01-23 PROCEDURE — 6370000000 HC RX 637 (ALT 250 FOR IP): Performed by: STUDENT IN AN ORGANIZED HEALTH CARE EDUCATION/TRAINING PROGRAM

## 2021-01-23 PROCEDURE — 85025 COMPLETE CBC W/AUTO DIFF WBC: CPT

## 2021-01-23 PROCEDURE — 82947 ASSAY GLUCOSE BLOOD QUANT: CPT

## 2021-01-23 PROCEDURE — 73090 X-RAY EXAM OF FOREARM: CPT

## 2021-01-23 PROCEDURE — 97161 PT EVAL LOW COMPLEX 20 MIN: CPT

## 2021-01-23 PROCEDURE — 93306 TTE W/DOPPLER COMPLETE: CPT

## 2021-01-23 PROCEDURE — 85730 THROMBOPLASTIN TIME PARTIAL: CPT

## 2021-01-23 PROCEDURE — 85610 PROTHROMBIN TIME: CPT

## 2021-01-23 PROCEDURE — 80053 COMPREHEN METABOLIC PANEL: CPT

## 2021-01-23 PROCEDURE — 6360000002 HC RX W HCPCS: Performed by: FAMILY MEDICINE

## 2021-01-23 PROCEDURE — 70551 MRI BRAIN STEM W/O DYE: CPT

## 2021-01-23 PROCEDURE — 80061 LIPID PANEL: CPT

## 2021-01-23 PROCEDURE — 83721 ASSAY OF BLOOD LIPOPROTEIN: CPT

## 2021-01-23 PROCEDURE — 99254 IP/OBS CNSLTJ NEW/EST MOD 60: CPT | Performed by: PSYCHIATRY & NEUROLOGY

## 2021-01-23 RX ORDER — NICOTINE 21 MG/24HR
1 PATCH, TRANSDERMAL 24 HOURS TRANSDERMAL DAILY
Qty: 30 PATCH | Refills: 3 | Status: SHIPPED | OUTPATIENT
Start: 2021-01-24

## 2021-01-23 RX ORDER — METHYLPREDNISOLONE SODIUM SUCCINATE 40 MG/ML
40 INJECTION, POWDER, LYOPHILIZED, FOR SOLUTION INTRAMUSCULAR; INTRAVENOUS EVERY 8 HOURS
Status: DISCONTINUED | OUTPATIENT
Start: 2021-01-23 | End: 2021-01-23 | Stop reason: HOSPADM

## 2021-01-23 RX ORDER — ATORVASTATIN CALCIUM 80 MG/1
80 TABLET, FILM COATED ORAL NIGHTLY
Qty: 30 TABLET | Refills: 3 | Status: SHIPPED | OUTPATIENT
Start: 2021-01-23

## 2021-01-23 RX ORDER — VALACYCLOVIR HYDROCHLORIDE 1 G/1
2000 TABLET, FILM COATED ORAL 3 TIMES DAILY
Qty: 21 TABLET | Refills: 0 | Status: SHIPPED | OUTPATIENT
Start: 2021-01-23 | End: 2021-01-30

## 2021-01-23 RX ORDER — METHADONE HYDROCHLORIDE 10 MG/5ML
120 SOLUTION ORAL DAILY
Status: DISCONTINUED | OUTPATIENT
Start: 2021-01-23 | End: 2021-01-23 | Stop reason: HOSPADM

## 2021-01-23 RX ADMIN — INSULIN GLARGINE 50 UNITS: 100 INJECTION, SOLUTION SUBCUTANEOUS at 07:54

## 2021-01-23 RX ADMIN — ASPIRIN 81 MG: 81 TABLET, COATED ORAL at 07:47

## 2021-01-23 RX ADMIN — ENOXAPARIN SODIUM 30 MG: 30 INJECTION SUBCUTANEOUS at 07:48

## 2021-01-23 RX ADMIN — METHYLPREDNISOLONE SODIUM SUCCINATE 40 MG: 40 INJECTION, POWDER, FOR SOLUTION INTRAMUSCULAR; INTRAVENOUS at 13:31

## 2021-01-23 RX ADMIN — Medication 10 ML: at 09:25

## 2021-01-23 RX ADMIN — Medication 10 ML: at 07:53

## 2021-01-23 RX ADMIN — SERTRALINE HYDROCHLORIDE 50 MG: 50 TABLET ORAL at 07:47

## 2021-01-23 RX ADMIN — INSULIN LISPRO 8 UNITS: 100 INJECTION, SOLUTION INTRAVENOUS; SUBCUTANEOUS at 17:32

## 2021-01-23 RX ADMIN — INSULIN LISPRO 2 UNITS: 100 INJECTION, SOLUTION INTRAVENOUS; SUBCUTANEOUS at 07:55

## 2021-01-23 RX ADMIN — METHADONE HYDROCHLORIDE 120 MG: 10 SOLUTION ORAL at 15:06

## 2021-01-23 RX ADMIN — INSULIN LISPRO 6 UNITS: 100 INJECTION, SOLUTION INTRAVENOUS; SUBCUTANEOUS at 12:40

## 2021-01-23 ASSESSMENT — ENCOUNTER SYMPTOMS
SHORTNESS OF BREATH: 0
CHOKING: 0
COLOR CHANGE: 0
ABDOMINAL DISTENTION: 0
RECTAL PAIN: 0
STRIDOR: 0
ANAL BLEEDING: 0
TROUBLE SWALLOWING: 0
PHOTOPHOBIA: 0
BLOOD IN STOOL: 0
VOICE CHANGE: 0
BACK PAIN: 0

## 2021-01-23 ASSESSMENT — PAIN SCALES - GENERAL: PAINLEVEL_OUTOF10: 0
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
## 2021-01-23 NOTE — PROGRESS NOTES
Physical Therapy    Facility/Department: [facility] PROGRESSIVE CARE  Initial Assessment    NAME: Mona Gaffney  : 1970  MRN: [id number]    Date of Service: 2021    Discharge Recommendations: Home Independently     PT Equipment Recommendations  Equipment Needed: No    Assessment  Assessment: All functional needs met  Prognosis: Excellent  PT Education: PT Role;Plan of Care  No Skilled PT: Independent with functional mobility   REQUIRES PT FOLLOW UP: No    Activity Tolerance  Patient Tolerated treatment well      Patient Diagnosis(es): The encounter diagnosis was Facial droop.     has a past medical history of Acute bronchitis with bronchospasm, Acute periodontitis, Adhesive capsulitis of right shoulder, Anxiety, Blurry vision, Carbuncle of neck, Caries, Chest pain, Constipation, Cough, Diastasis of muscle, Difficulty breathing, Drug dependence (HCC), Fatigue, Glycosuria, Hyperlipidemia, Hypothyroid, Opioid abuse, episodic (HCC), Opioid dependence (HCC), Opioid withdrawal (HCC), Osteoarthritis, Otitis media, Pain, joint, shoulder, Periodontal abscess, Pharyngitis, Pneumonia, Polydipsia, Rhinitis, Sciatica, Shoulder fracture, right, Tobacco abuse counseling, Tooth pain, Trapezius muscle strain, Type 2 diabetes mellitus, uncontrolled ([address] and Wheezing. has no past surgical history on file.     Vision/Hearing  Vision: Within Functional Limits  Hearing: Within functional limits       General  Chart Reviewed: Yes  Patient assessed for rehabilitation services?: Yes  Response To Previous Treatment: Not applicable  Family / Caregiver Present: No  Referring Practitioner: Maddy Sánchez MD  Referral Date : 21  Diagnosis: Facial Droop  Follows Commands: Within Functional Limits    Subjective  Pain Screening  Patient Currently in Pain: No  Vital Signs  Patient Currently in Pain: No  Orientation  Overall Orientation Status: Within Normal Limits  Social/Functional History  Lives With: Spouse  Type of Home: House  Home Layout: Two level  Home Access: Stairs to enter with rails  Entrance Stairs - Number of Steps: 6  Entrance Stairs - Rails: Both  ADL Assistance: Independent  Ambulation Assistance: Independent  Transfer Assistance: Independent    Objective  Bed mobility  Rolling to Left: Independent  Rolling to Right: Independent  Supine to Sit: Independent  Sit to Supine: Independent  Scooting: Independent  Transfers  Sit to Stand: Independent  Stand to sit: Independent  Bed to Chair: Independent  Ambulation  Ambulation?: Yes  Ambulation 1  Surface: level tile  Device: No Device  Assistance: Independent  Gait Deviations: None  Distance: 100 ft  Plan  All functional needs met. Discontinue physical therapy at this time.   Safety Devices  Type of devices: Left in bed, Call light within reach    AM-PAC Score  AM-PAC Inpatient Mobility without Stair Climbing Raw Score : 20 (01/23/21 1123)  AM-PAC Inpatient without Stair Climbing T-Scale Score : 60.57 (01/23/21 1123)  Mobility Inpatient CMS 0-100% Score: 0 (01/23/21 1123)  Mobility Inpatient without Stair CMS G-Code Modifier : [facility] (01/23/21 1123)    Therapy Time   Individual Concurrent Group Co-treatment   Time In [address]         Time Out 1100         Minutes Aiden 939, PT DPT

## 2021-01-23 NOTE — ED NOTES
Admission Dx: facial droop    Pts Chief Complaints on Arrival: facial droop    ADL's - Self-care    Pending Diagnostics:  none    Residence PTA: multiple-story home    Special Considerations/Circumstances:  none    Vitals: Current vital signs:  BP (!) 145/98   Pulse 70   Temp 98 °F (36.7 °C) (Oral)   Resp 14   Wt 230 lb (104.3 kg)   SpO2 91%   BMI 31.19 kg/m²                MEWS Score: 1            Aleks Palumbo RN  01/22/21 2018

## 2021-01-23 NOTE — PROGRESS NOTES
Writer sent a secure message via [employer] to Dr. Tonnie Hodgkin to make sure he was notified of consult.

## 2021-01-23 NOTE — PLAN OF CARE
Problem: Infection:  Goal: Will remain free from infection  Description: Will remain free from infection  1/23/2021 1802 by Rahel Ruiz RN  Outcome: Completed  1/23/2021 0439 by Dorota Mehta RN  Outcome: Ongoing     Problem: Safety:  Goal: Free from accidental physical injury  Description: Free from accidental physical injury  1/23/2021 1802 by Rahel Ruiz RN  Outcome: Completed  1/23/2021 0439 by Dorota Mehta RN  Outcome: Met This Shift     Problem: Daily Care:  Goal: Daily care needs are met  Description: Daily care needs are met  1/23/2021 1802 by Rahel Ruiz RN  Outcome: Completed  1/23/2021 0439 by Dorota Mehta RN  Outcome: Met This Shift     Problem: Pain:  Goal: Patient's pain/discomfort is manageable  Description: Patient's pain/discomfort is manageable  1/23/2021 1802 by Rahel Ruiz RN  Outcome: Completed  1/23/2021 0439 by Dorota Mehta RN  Outcome: Met This Shift     Problem: Skin Integrity:  Goal: Skin integrity will stabilize  Description: Skin integrity will stabilize  1/23/2021 1802 by Rahel Ruiz RN  Outcome: Completed  1/23/2021 0439 by Dorota Mehta RN  Outcome: Ongoing     Problem: Discharge Planning:  Goal: Patients continuum of care needs are met  Description: Patients continuum of care needs are met  1/23/2021 1802 by Rahel Ruiz RN  Outcome: Completed  1/23/2021 0439 by Dorota Mehta RN  Outcome: Ongoing     Problem: HEMODYNAMIC STATUS  Goal: Patient has stable vital signs and fluid balance  1/23/2021 1802 by Rahel Ruiz RN  Outcome: Completed  1/23/2021 0439 by Dorota Mehta RN  Outcome: Ongoing     Problem: ACTIVITY INTOLERANCE/IMPAIRED MOBILITY  Goal: Mobility/activity is maintained at optimum level for patient  1/23/2021 1802 by Rahel Ruiz RN  Outcome: Completed  1/23/2021 0439 by Dorota Mehta RN  Outcome: Met This Shift     Problem: COMMUNICATION IMPAIRMENT  Goal: Ability to express needs and understand communication  1/23/2021 1802 by Rahel Ruiz RN  Outcome: Completed  1/23/2021 0439 by Bj Wayne RN  Outcome: Met This Shift

## 2021-01-23 NOTE — H&P
History and Physical      Name: Jeremiah Fowler  MRN: [id number]     [place]: [de-identified]  Room: 2110/2110-01    Admit Date: 1/22/2021  PCP: Nahun Ryder      Chief Complaint:     Chief Complaint   Patient presents with    Facial Droop       History Obtained From:     patient, electronic medical record    History of Present Illness:      Jeremiah Fowler is a  54 y.o.  male who presents with Facial Droop  Patient states that patient started feeling numbness of the right side of his lip and right lower face for the last 2 days. Patient denies numbness tingling of the face. Patient denies weakness of the upper and lower extremity visual change lightheadedness dizziness chest pain shortness of breath difficulty walking abdominal pain nausea vomiting or loss of consciousness. Past Medical History:     Past Medical History:   Diagnosis Date    Acute bronchitis with bronchospasm     Acute periodontitis     Adhesive capsulitis of right shoulder     Anxiety     Blurry vision     Carbuncle of neck     Caries     Chest pain     Constipation     Cough     Diastasis of muscle     Difficulty breathing     Drug dependence (HCC)     Fatigue     Glycosuria     Hyperlipidemia     Hypothyroid     Opioid abuse, episodic (HCC)     Opioid dependence (HCC)     Opioid withdrawal (HCC)     Osteoarthritis     Otitis media     Pain, joint, shoulder     Periodontal abscess     Pharyngitis     Pneumonia     Polydipsia     Rhinitis     Sciatica     Shoulder fracture, right     Tobacco abuse counseling     Tooth pain     Trapezius muscle strain     Type 2 diabetes mellitus, uncontrolled (HCC)     Wheezing         Past Surgical History:     History reviewed. No pertinent surgical history. Medications Prior to Admission:       Prior to Admission medications    Medication Sig Start Date End Date Taking?  Authorizing Provider   sertraline (ZOLOFT) 50 MG tablet Take 50 mg by mouth daily   Yes and neck stiffness. Skin: Negative for color change, pallor and rash. Allergic/Immunologic: Negative for environmental allergies and food allergies. Neurological: Positive for facial asymmetry. Negative for dizziness, tremors, seizures, syncope, speech difficulty, light-headedness, numbness and headaches. Hematological: Negative for adenopathy. Does not bruise/bleed easily. Psychiatric/Behavioral: Negative for agitation, behavioral problems, hallucinations, self-injury and suicidal ideas. Code Status:  Full Code    Physical Exam:     Vitals:  BP (!) 159/90   Pulse 59   Temp 98.1 °F (36.7 °C) (Oral)   Resp 16   Ht 6' (1.829 m)   Wt 241 lb 6.5 oz (109.5 kg)   SpO2 95%   BMI 32.74 kg/m²   Temp (24hrs), Av.1 °F (36.7 °C), Min:98 °F (36.7 °C), Max:98.3 °F (36.8 °C)        Physical Exam  Vitals signs reviewed. Constitutional:       Appearance: Normal appearance. He is not diaphoretic. HENT:      Head: Normocephalic and atraumatic. Right Ear: External ear normal.      Left Ear: External ear normal.      Nose: Nose normal.      Mouth/Throat:      Mouth: Mucous membranes are moist.      Pharynx: Oropharynx is clear. Eyes:      Extraocular Movements: Extraocular movements intact. Conjunctiva/sclera: Conjunctivae normal.      Pupils: Pupils are equal, round, and reactive to light. Comments: No diplopia or nystagmus   Neck:      Musculoskeletal: Normal range of motion and neck supple. No neck rigidity. Cardiovascular:      Rate and Rhythm: Normal rate and regular rhythm. Pulses: Normal pulses. Heart sounds: Normal heart sounds. Pulmonary:      Effort: Pulmonary effort is normal.      Breath sounds: Normal breath sounds. Abdominal:      General: Bowel sounds are normal. There is no distension. Palpations: Abdomen is soft. Musculoskeletal: Normal range of motion. General: No tenderness or deformity. Right lower leg: No edema.       Left lower leg: No edema. Skin:     General: Skin is warm and dry. Capillary Refill: Capillary refill takes less than 2 seconds. Coloration: Skin is not jaundiced. Neurological:      General: No focal deficit present. Mental Status: Mental status is at baseline. Cranial Nerves: Cranial nerve deficit (Deviated right nasolabial fold. Patient is able to frown.) present. Sensory: No sensory deficit. Motor: No weakness (Power 5 out of 5 in the upper and lower extremity).    Psychiatric:         Mood and Affect: Mood normal.         Behavior: Behavior normal.               Data:     Recent Results (from the past 24 hour(s))   POC Glucose Fingerstick    Collection Time: 01/22/21  4:44 PM   Result Value Ref Range    POC Glucose 279 (H) 75 - 110 mg/dL   EKG 12 Lead    Collection Time: 01/22/21  4:56 PM   Result Value Ref Range    Ventricular Rate 74 BPM    Atrial Rate 74 BPM    P-R Interval 142 ms    QRS Duration 84 ms    Q-T Interval 422 ms    QTc Calculation (Bazett) 468 ms    P Axis 53 degrees    T Axis 48 degrees   CBC Auto Differential    Collection Time: 01/22/21  5:00 PM   Result Value Ref Range    WBC 9.9 3.5 - 11.0 k/uL    RBC 4.76 4.5 - 5.9 m/uL    Hemoglobin 14.6 13.5 - 17.5 g/dL    Hematocrit 43.4 41 - 53 %    MCV 91.1 80 - 100 fL    MCH 30.7 26 - 34 pg    MCHC 33.6 31 - 37 g/dL    RDW 12.9 11.5 - 14.9 %    Platelets 026 628 - 747 k/uL    MPV 7.8 6.0 - 12.0 fL    NRBC Automated NOT REPORTED per 100 WBC    Differential Type NOT REPORTED     Immature Granulocytes NOT REPORTED 0 %    Absolute Immature Granulocyte NOT REPORTED 0.00 - 0.30 k/uL    WBC Morphology NOT REPORTED     RBC Morphology NOT REPORTED     Platelet Estimate NOT REPORTED     Seg Neutrophils 57 36 - 66 %    Lymphocytes 32 24 - 44 %    Monocytes 8 (H) 1 - 7 %    Eosinophils % 2 0 - 4 %    Basophils 1 0 - 2 %    Segs Absolute 5.70 1.3 - 9.1 k/uL    Absolute Lymph # 3.10 1.0 - 4.8 k/uL    Absolute Mono # 0.80 0.1 - 1.3 k/uL Absolute Eos # 0.20 0.0 - 0.4 k/uL    Basophils Absolute 0.10 0.0 - 0.2 k/uL   Basic Metabolic Panel w/ Reflex to MG    Collection Time: 01/22/21  5:00 PM   Result Value Ref Range    Glucose 274 (H) 70 - 99 mg/dL    BUN 14 6 - 20 mg/dL    CREATININE 0.59 (L) 0.70 - 1.20 mg/dL    Bun/Cre Ratio NOT REPORTED 9 - 20    Calcium 9.0 8.6 - 10.4 mg/dL    Sodium 135 135 - 144 mmol/L    Potassium 4.3 3.7 - 5.3 mmol/L    Chloride 97 (L) 98 - 107 mmol/L    CO2 23 20 - 31 mmol/L    Anion Gap 15 9 - 17 mmol/L    GFR Non-African American >60 >60 mL/min    GFR African American >60 >60 mL/min    GFR Comment          GFR Staging NOT REPORTED    Troponin    Collection Time: 01/22/21  5:00 PM   Result Value Ref Range    Troponin, High Sensitivity 6 0 - 22 ng/L    Troponin T NOT REPORTED <0.03 ng/mL    Troponin Interp NOT REPORTED    Protime-INR    Collection Time: 01/22/21  5:00 PM   Result Value Ref Range    Protime 11.7 (L) 11.8 - 14.6 sec    INR 0.9    APTT    Collection Time: 01/22/21  5:00 PM   Result Value Ref Range    PTT 30.1 24.0 - 36.0 sec   POC Glucose Fingerstick    Collection Time: 01/22/21  9:50 PM   Result Value Ref Range    POC Glucose 220 (H) 75 - 110 mg/dL   CBC auto differential    Collection Time: 01/23/21  6:46 AM   Result Value Ref Range    WBC 9.4 3.5 - 11.0 k/uL    RBC 4.79 4.5 - 5.9 m/uL    Hemoglobin 15.4 13.5 - 17.5 g/dL    Hematocrit 43.5 41 - 53 %    MCV 90.9 80 - 100 fL    MCH 32.1 26 - 34 pg    MCHC 35.3 31 - 37 g/dL    RDW 13.2 11.5 - 14.9 %    Platelets 920 518 - 853 k/uL    MPV 7.4 6.0 - 12.0 fL    NRBC Automated NOT REPORTED per 100 WBC    Differential Type NOT REPORTED     Seg Neutrophils 52 36 - 66 %    Lymphocytes 38 24 - 44 %    Monocytes 7 1 - 7 %    Eosinophils % 2 0 - 4 %    Basophils 1 0 - 2 %    Immature Granulocytes NOT REPORTED 0 %    Segs Absolute 5.00 1.3 - 9.1 k/uL    Absolute Lymph # 3.50 1.0 - 4.8 k/uL    Absolute Mono # 0.60 0.1 - 1.3 k/uL    Absolute Eos # 0.20 0.0 - 0.4 k/uL Basophils Absolute 0.10 0.0 - 0.2 k/uL    Absolute Immature Granulocyte NOT REPORTED 0.00 - 0.30 k/uL    WBC Morphology NOT REPORTED     RBC Morphology NOT REPORTED     Platelet Estimate NOT REPORTED    Comprehensive Metabolic Panel w/ Reflex to MG    Collection Time: 01/23/21  6:46 AM   Result Value Ref Range    Glucose 169 (H) 70 - 99 mg/dL    BUN 10 6 - 20 mg/dL    CREATININE 0.49 (L) 0.70 - 1.20 mg/dL    Bun/Cre Ratio NOT REPORTED 9 - 20    Calcium 9.2 8.6 - 10.4 mg/dL    Sodium 138 135 - 144 mmol/L    Potassium 4.1 3.7 - 5.3 mmol/L    Chloride 101 98 - 107 mmol/L    CO2 28 20 - 31 mmol/L    Anion Gap 9 9 - 17 mmol/L    Alkaline Phosphatase 88 40 - 129 U/L    ALT 20 5 - 41 U/L    AST 16 <40 U/L    Total Bilirubin 0.43 0.3 - 1.2 mg/dL    Total Protein 7.3 6.4 - 8.3 g/dL    Alb 3.9 3.5 - 5.2 g/dL    Albumin/Globulin Ratio NOT REPORTED 1.0 - 2.5    GFR Non-African American >60 >60 mL/min    GFR African American >60 >60 mL/min    GFR Comment          GFR Staging NOT REPORTED    Lipid Panel    Collection Time: 01/23/21  6:46 AM   Result Value Ref Range    Cholesterol 277 (H) <200 mg/dL    HDL 29 (L) >40 mg/dL    LDL Cholesterol      0 - 130 mg/dL    Chol/HDL Ratio 9.6 (H) <5    Triglycerides 1,119 (H) <150 mg/dL    VLDL NOT REPORTED 1 - 30 mg/dL   Protime-INR    Collection Time: 01/23/21  6:46 AM   Result Value Ref Range    Protime 12.8 11.8 - 14.6 sec    INR 1.0    APTT    Collection Time: 01/23/21  6:46 AM   Result Value Ref Range    PTT 28.7 24.0 - 36.0 sec   LDL Cholesterol, Direct    Collection Time: 01/23/21  6:46 AM   Result Value Ref Range    LDL Direct 65 <100 mg/dL   POC Glucose Fingerstick    Collection Time: 01/23/21  7:29 AM   Result Value Ref Range    POC Glucose 189 (H) 75 - 110 mg/dL   POC Glucose Fingerstick    Collection Time: 01/23/21 11:12 AM   Result Value Ref Range    POC Glucose 293 (H) 75 - 110 mg/dL       Assesment:     Primary Problem  Facial droop    Principal Problem:    Facial

## 2021-01-23 NOTE — PLAN OF CARE
Problem: Safety:  Goal: Free from accidental physical injury  Description: Free from accidental physical injury  Outcome: Met This Shift     Problem: Daily Care:  Goal: Daily care needs are met  Description: Daily care needs are met  Outcome: Met This Shift     Problem: Pain:  Goal: Patient's pain/discomfort is manageable  Description: Patient's pain/discomfort is manageable  Outcome: Met This Shift     Problem: ACTIVITY INTOLERANCE/IMPAIRED MOBILITY  Goal: Mobility/activity is maintained at optimum level for patient  Outcome: Met This Shift     Problem: COMMUNICATION IMPAIRMENT  Goal: Ability to express needs and understand communication  Outcome: Met This Shift     Problem: Infection:  Goal: Will remain free from infection  Description: Will remain free from infection  Outcome: Ongoing     Problem: Skin Integrity:  Goal: Skin integrity will stabilize  Description: Skin integrity will stabilize  Outcome: Ongoing     Problem: Discharge Planning:  Goal: Patients continuum of care needs are met  Description: Patients continuum of care needs are met  Outcome: Ongoing     Problem: HEMODYNAMIC STATUS  Goal: Patient has stable vital signs and fluid balance  Outcome: Ongoing

## 2021-01-23 NOTE — PROGRESS NOTES
Reviewed discharge instructions, follow up appts, and prescriptions with patient. All questions answered to patient satisfaction. English

## 2021-01-23 NOTE — DISCHARGE SUMMARY
Discharge Summary      Patient ID: Kesha Oakes    MRN: [id number]     Acct:  [de-identified]       Patient's PCP: Lissa Huston    Admit Date: 1/22/2021     Discharge Date: 1/23/2021 1/23/2021    Admitting Physician: Trini Navas MD    Discharge Physician: Es Blount MD     Discharge Diagnoses:    Primary Problem  Facial droop    Principal Problem:    Facial droop  Active Problems:    Type 2 diabetes mellitus, with long-term current use of insulin (HCC)    Anxiety and depression    Essential hypertension    Dependence on nicotine from cigarettes    Bell's palsy  Resolved Problems:    * No resolved hospital problems. *    Past Medical History:   Diagnosis Date    Acute bronchitis with bronchospasm     Acute periodontitis     Adhesive capsulitis of right shoulder     Anxiety     Blurry vision     Carbuncle of neck     Caries     Chest pain     Constipation     Cough     Diastasis of muscle     Difficulty breathing     Drug dependence (HCC)     Fatigue     Glycosuria     Hyperlipidemia     Hypothyroid     Opioid abuse, episodic (HCC)     Opioid dependence (HCC)     Opioid withdrawal (HCC)     Osteoarthritis     Otitis media     Pain, joint, shoulder     Periodontal abscess     Pharyngitis     Pneumonia     Polydipsia     Rhinitis     Sciatica     Shoulder fracture, right     Tobacco abuse counseling     Tooth pain     Trapezius muscle strain     Type 2 diabetes mellitus, uncontrolled (HCC)     Wheezing      The patient was seen and examined on day of discharge and this discharge summary is in conjunction with daily progress note from day of discharge. Code Status:  Full Code    Hospital Course:   H&P Reviewed. patient with a past medical history of hypertension diabetes mellitus nicotine dependence presented with right facial droop. ER stroke protocol was initiated CT head and CTA head report was unremarkable.  She was started on aspirin Lipitor and admitted to the floor. Neurology services were consulted MRI brain report was reviewed. After neurology consultation patient was diagnosed with Bell's palsy. Per Neurology patient was okay to be discharged on Valtrex 1 g 3 times a day for 1 week, eye patch and eye tears. Patient is being discharged in stable condition. Follow-up with neurology in 4 weeks    Discharge day progress note: Today   Patient was seen and examined at bedside today. Hemodynamically stable. No chest pain, shortness of breath, fever, chills, nausea, vomiting, palpitations, or abdominal pain reported. No events reported overnight. Review of Systems    Physical Exam    Consults:  IP CONSULT TO NEUROLOGY  IP CONSULT TO PRIMARY CARE PROVIDER    Significant Diagnostic Studies: as above, and as follows:    Treatments: as above    Disposition: home    Discharged Condition: Stable    Follow Up: Nahun Ryder in two weeks   neurology in 4 weeks    Discharge Medications:    Crescencio Salmons   Home Medication Instructions [id number]    Printed on:01/23/21 0812   Medication Information                      amLODIPine (NORVASC) 5 MG tablet  Take 5 mg by mouth daily             atorvastatin (LIPITOR) 80 MG tablet  Take 1 tablet by mouth nightly             glucose blood VI test strips (ASCENSIA AUTODISC VI;ONE TOUCH ULTRA TEST VI) strip  1 each by In Vitro route 2 times daily As needed.              ibuprofen (ADVIL;MOTRIN) 800 MG tablet  Take 800 mg by mouth 3 times daily as needed for Pain             insulin glargine (LANTUS;BASAGLAR) 100 UNIT/ML injection pen  Inject 80 Units into the skin 2 times daily             Insulin Pen Needle 32G X 4 MM MISC  1 each by Does not apply route daily             Lancets MISC  1 each by Does not apply route 2 times daily             Liraglutide (VICTOZA) 18 MG/3ML SOPN SC injection  Inject 1.2 mg into the skin daily             metFORMIN (GLUCOPHAGE) 1000 MG tablet  Take 1 tablet by mouth 2 times daily (with meals)             methadone 10 MG/5ML solution  Take 120 mg by mouth daily. nicotine (NICODERM CQ) 14 MG/24HR  Place 1 patch onto the skin daily             sertraline (ZOLOFT) 50 MG tablet  Take 50 mg by mouth daily             valACYclovir (VALTREX) 1 g tablet  Take 2 tablets by mouth 3 times daily for 7 days                          Time Spent on discharge is more than  35 min in the examination, evaluation, counseling and review of medications and discharge plan.       Electronically signed by Halie Us MD     Copy sent to Dr. Eddie Egan

## 2021-01-23 NOTE — PROGRESS NOTES
Speech Language Pathology  Facility/Department: [address] [place] [address]   CLINICAL BEDSIDE SWALLOW EVALUATION    NAME: Randy Lucas  : 1970  MRN: [id number]    ADMISSION DATE: 2021  ADMITTING DIAGNOSIS: has Type 2 diabetes mellitus, with long-term current use of insulin ([facility] [address] Smoker; Furuncle of neck; Sebaceous cyst; Opioid dependence with withdrawal ([facility] [address] Anxiety and depression; Facial droop; Essential hypertension; and Dependence on nicotine from cigarettes on their problem list.    Recent Chest Xray/CT of Chest: n/a    Date of Eval: 2021  Evaluating Therapist: Jessa Calloway    Current Diet level:  Current Diet : Regular  Current Liquid Diet : Thin      Primary Complaint  Patient Complaint: Pt. reporting sometimes foods get \"caught\" on R side of face and using tongue to completely clear oral cavity. Per Pt. has trouble drinking from standard cup edge d/t labial weakness therefore has anterior spillage. No addititional c/o trouble swallowing or coughing with foods reported. Pain:  Pain Assessment  Pain Level: 0    Reason for Referral  Randy Lucas was referred for a bedside swallow evaluation to assess the efficiency of his swallow function, identify signs and symptoms of aspiration and make recommendations regarding safe dietary consistencies, effective compensatory strategies, and safe eating environment. Impression  Dysphagia Diagnosis: Mild oral stage dysphagia  Dysphagia Impression : Pt. presents with probable safe swallow for Regular diet with thin liquids as was evidenced by no overt s/s of aspiration with consistencies. Recommending use of straw or teaspoon with liquid d/t anterior spillage noted with standard cup edge. Recommending continued use of lingual sweep and liquid wash to clear oral cavity of residue (specifically on R side). Recommend small sips and bites, only feed when alert and awake and upright at 90 degrees for all PO intake.  Recommend close monitoring for overt/clinical s/s of aspiration and D/C PO intake and complete Modified Barium Swallow Study should they occur. Results and recommendations reported to RN. Dysphagia Outcome Severity Scale: Level 6: Within functional limits/Modified independence     Treatment Plan  Requires SLP Intervention: Yes     D/C Recommendations: To be determined       Recommended Diet and Intervention  Diet Solids Recommendation: Regular  Liquid Consistency Recommendation: Thin  Recommended Form of Meds: PO  Recommendations: Dysphagia treatment(diet tolerance monitoring)  Therapeutic Interventions: Diet tolerance monitoring;Oral motor exercises; Patient/Family education    Compensatory Swallowing Strategies  Compensatory Swallowing Strategies: Alternate solids and liquids;Eat/Feed slowly;Upright as possible for all oral intake; Check for pocketing of food on the Right;Small bites/sips; Other (comments)(Liquid per straw or teaspoon)    Treatment/Goals  Dysphagia Goals: The patient will tolerate recommended diet without observed clinical signs of aspiration; The patient will recall and perform compensatory strategies, with no cues. ;The patient will improve oral motor function via therapeutic oral motor exercises to 5/5 each trial.    General  Chart Reviewed: Yes  Subjective  Subjective: Pt. with R sided facial droop - little to no labial movement on R side. Behavior/Cognition: Alert; Cooperative;Pleasant mood  Respiratory Status: Room air  O2 Device: None (Room air)  Communication Observation: Dysarthria  Follows Directions: Complex  Dentition: Adequate  Patient Positioning: Upright in bed  Baseline Vocal Quality: Normal  Volitional Cough: Strong  Consistencies Administered: Reg solid; Dysphagia Soft and Bite-Sized (Dysphagia III); Dysphagia Pureed (Dysphagia I); Thin - cup; Thin - straw; Ice Chips; Thin - teaspoon           Vision/Hearing  Vision  Vision: Within Functional Limits  Hearing  Hearing: Within functional limits    Oral Motor Deficits  Oral/Motor  Oral Motor: Exceptions to [facility]  Labial ROM: Reduced right  Labial Symmetry: Abnormal symmetry right  Labial Strength: Reduced  Labial Coordination: Reduced    Oral Phase Dysfunction  Oral Phase  Oral Phase: Exceptions  Oral Phase Dysfunction  Spillage Right: Thin - cup  Pocketing Right: Reg solid  Lingual/Palatal Residue: Reg solid  Oral Phase  Oral Phase - Comment: Residue in R side of oral cavity noted with regular solid, however, Pt. independently facilitating lingual sweep and liquid wash to clear. Anterior spillage observed with thin liquid via standard cup edge d/t labial weakness. No spillage noted with straw. Adequate, timely mastication and A-P transit with all consistencies. Indicators of Pharyngeal Phase Dysfunction   Pharyngeal Phase  Pharyngeal Phase: WNL  Pharyngeal Phase   Pharyngeal: No overt s/s of aspiration observed with consistencies tested.     Prognosis  Prognosis  Prognosis for safe diet advancement: good  Barriers to reach goals: age  Individuals consulted  Consulted and agree with results and recommendations: Patient;RN    Education  Patient Education: Yes  Patient Education Response: Verbalizes understanding;Demonstrated understanding             Therapy Time  SLP Individual Minutes  Time In: 1520  Time Out: [phone]  Minutes: Melvin Brunson M.A., [id number] [facility]  1/23/2021 3:58 PM
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
## 2021-01-23 NOTE — PROGRESS NOTES
Speech Language Pathology  Facility/Department: [facility]  Initial Speech/Language/Cognitive Assessment    NAME: Randy Lucas  : 1970   MRN: [id number]  ADMISSION DATE: 2021  ADMITTING DIAGNOSIS: has Type 2 diabetes mellitus, with long-term current use of insulin ([facility] [address] Smoker; Furuncle of neck; Sebaceous cyst; Opioid dependence with withdrawal ([facility] [address] Anxiety and depression; Facial droop; Essential hypertension; and Dependence on nicotine from cigarettes on their problem list.    Date of Eval: 2021   Evaluating Therapist: HEIDI Larsen    RECENT RESULTS  CT OF HEAD/MRI: CT Head (): Impression   1. No acute intracranial abnormality. 2. Unremarkable CTA of the head and neck. Findings were discussed with Diana Carmen at 6:54 pm on 2021.         Primary Complaint: Per H&P:  Patient states that patient started feeling numbness of the right side of his lip and right lower face for the last 2 days. Patient denies numbness tingling of the face. Patient denies weakness of the upper and lower extremity visual change lightheadedness dizziness chest pain shortness of breath difficulty walking abdominal pain nausea vomiting or loss of consciousness.     Pain:  Pain Assessment  Pain Level: 0    Assessment:  Cognitive Diagnosis: Functional cognition  Aphasia Diagnosis: No aphasia present  Speech Diagnosis: Mild dysarthria - speech 100% intelligible   Diagnosis: Pt. presents with mild dysarthria characterized by imprecise consonant production. Speech at conversational level judged to be 100% intelligible. O/M deficits present include R sided facial droop with little to no movement on R side of face (including with labial retraction, protrusion, and lateralization). Lingual ROM, coordination, and strength judged to be [place]. Cognition/language judged to be functional -- per Pt., at baseline. ST recommended to follow up and provide treatment to address noted deficits. Education provided. Recommendations:  Requires SLP Intervention: Yes     D/C Recommendations: To be determined       Plan:   Goals:  Short-term Goals  Goal 1: Pt. will complete OMEs with 100% returned demo  Goal 2: Increase Pt. education re: compensatory dysarthria strategies to 100% returned demo   Patient/family involved in developing goals and treatment plan: Yes, Pt. Subjective:   Previous level of function and limitations: Independent   General  Chart Reviewed: Yes  Patient assessed for rehabilitation services?: Yes  Family / Caregiver Present: No  Subjective  Subjective: Upon arrival to room, Pt. having conversation with RN re: medications and walking around room independently. Pt. reporting no changes in cognition/language at this time. Per Pt., has had R sided facial droop for ~2 days and slurred speech. Pt. stating, \"I just want to know what the hell is happening. This is so weird man\". Pt. discussing how recalls father had a stroke and needed ST.      Vision  Vision: Within Functional Limits  Hearing  Hearing: Within functional limits           Objective:     Oral/Motor  Oral Motor: Exceptions to [facility]  Labial ROM: Reduced right  Labial Symmetry: Abnormal symmetry right  Labial Strength: Reduced  Labial Coordination: Reduced    Auditory Comprehension  Comprehension: Within Functional Limits         Expression  Primary Mode of Expression: Verbal    Verbal Expression  Verbal Expression: Within functional limits         Motor Speech  Motor Speech: Exceptions to WFL(speech judged to be 100% intelligible)  Dysarthria : Mild    Pragmatics/Social Functioning  Pragmatics: Within functional limits    Cognition:      Orientation  Overall Orientation Status: Within Normal Limits  Attention  Attention: Within Functional Limits  Memory  Memory: Within Funtional Limits  Problem Solving  Problem Solving: Within Functional Limits  Numeric Reasoning  Numeric Reasoning: Within Functional Limits  Abstract Reasoning  Abstract Reasoning: Within Functional Limits  Safety/Judgement  Safety/Judgement: Within Functional Limits    Additional Assessments:   See clinical bedside swallowing evaluation note.            Prognosis:  Speech Therapy Prognosis  Prognosis: Good  Prognosis Considerations: Previous Level of Function;Participation Level  Individuals consulted  Consulted and agree with results and recommendations: Patient;RN    Education:  Patient Education: Yes  Patient Education Response: Verbalizes understanding;Demonstrated understanding          Therapy Time:   Individual Concurrent Group Co-treatment   Time In 1508         Time Out 1520         Minutes [address] [facility] [id number] [place] [address] Road  1/23/2021 3:47 PM

## 2021-01-23 NOTE — CONSULTS
55 yo wm with right facial weakness and numbness initiating day PTA at 4 PM . He noticed strange feeling right side of lip along with right lower facial droop . Later reporting some trouble closing right eye  . Head CT normal.CTA head and neck with no large vessel occlusion . MRI of Head minor nonspecific subcortical T2 weighted intensities. He was loaded with aspirin 325 mg and plavix 300 mg in ER. He has diabetes mellitus on insulin along with history of opiod use . He denies headache or other focal motor ,sensory , bulbar or visual complaint . Testing cholesterol 277 , TG 1119 . Cardiac 2 D echo normal LVF EF 55-60 %  MRI of Head minor nonspecific subcorticall T2 weighted intensities      Past Medical History:   Diagnosis Date    Acute bronchitis with bronchospasm     Acute periodontitis     Adhesive capsulitis of right shoulder     Anxiety     Blurry vision     Carbuncle of neck     Caries     Chest pain     Constipation     Cough     Diastasis of muscle     Difficulty breathing     Drug dependence (HCC)     Fatigue     Glycosuria     Hyperlipidemia     Hypothyroid     Opioid abuse, episodic (HCC)     Opioid dependence (HCC)     Opioid withdrawal (HCC)     Osteoarthritis     Otitis media     Pain, joint, shoulder     Periodontal abscess     Pharyngitis     Pneumonia     Polydipsia     Rhinitis     Sciatica     Shoulder fracture, right     Tobacco abuse counseling     Tooth pain     Trapezius muscle strain     Type 2 diabetes mellitus, uncontrolled ([address]     Wheezing        History reviewed. No pertinent surgical history. History reviewed. No pertinent family history.     Social History     Socioeconomic History    Marital status:      Spouse name: None    Number of children: None    Years of education: None    Highest education level: None   Occupational History    None   Social Needs    Financial resource strain: None    Food insecurity     Worry: None Inability: None    Transportation needs     Medical: None     Non-medical: None   Tobacco Use    Smoking status: Current Every Day Smoker     Packs/day: 1.50     Years: 25.00     Pack years: 37.50    Smokeless tobacco: Never Used   Substance and Sexual Activity    Alcohol use: None    Drug use: None    Sexual activity: None   Lifestyle    Physical activity     Days per week: None     Minutes per session: None    Stress: None   Relationships    Social connections     Talks on phone: None     Gets together: None     Attends Rastafari service: None     Active member of club or organization: None     Attends meetings of clubs or organizations: None     Relationship status: None    Intimate partner violence     Fear of current or ex partner: None     Emotionally abused: None     Physically abused: None     Forced sexual activity: None   Other Topics Concern    None   Social History Narrative    None       Current Facility-Administered Medications   Medication Dose Route Frequency Provider Last Rate Last Admin    methadone 10 MG/5ML solution 120 mg  120 mg Oral Daily Lizbeth Smyth MD   120 mg at 01/23/21 1506    methylPREDNISolone sodium (SOLU-MEDROL) injection 40 mg  40 mg Intravenous Q8H Lizbeth Smyth MD   40 mg at 01/23/21 1331    insulin glargine (LANTUS) injection vial 50 Units  50 Units Subcutaneous BID Cira Phillips MD   50 Units at 01/23/21 0754    sertraline (ZOLOFT) tablet 50 mg  50 mg Oral Daily Cira Phillips MD   50 mg at 01/23/21 0747    sodium chloride flush 0.9 % injection 10 mL  10 mL Intravenous 2 times per day Cira Phillips MD        sodium chloride flush 0.9 % injection 10 mL  10 mL Intravenous PRN Cira Phillips MD        enoxaparin (LOVENOX) injection 30 mg  30 mg Subcutaneous BID Cira Phillips MD   30 mg at 01/23/21 0748    promethazine (PHENERGAN) tablet 12.5 mg  12.5 mg Oral Q6H PRN Cira Phillips MD        Or    ondansetron (ZOFRAN) injection 4 mg  4 mg Intravenous Q6H PRN Gustabo Frost MD        polyethylene glycol (GLYCOLAX) packet 17 g  17 g Oral Daily PRN Gustabo Frost MD        acetaminophen (TYLENOL) tablet 650 mg  650 mg Oral Q6H PRN Gustabo Frost MD        Or    acetaminophen (TYLENOL) suppository 650 mg  650 mg Rectal Q6H PRN Gustabo Frost MD        insulin lispro (HUMALOG) injection vial 0-12 Units  0-12 Units Subcutaneous TID WC Gustabo Frost MD   6 Units at 01/23/21 1240    insulin lispro (HUMALOG) injection vial 0-6 Units  0-6 Units Subcutaneous Nightly Gustabo Frost MD   2 Units at 01/22/21 2311    sodium chloride flush 0.9 % injection 10 mL  10 mL Intravenous 2 times per day Willard Vijay, DO   10 mL at 01/23/21 0753    sodium chloride flush 0.9 % injection 10 mL  10 mL Intravenous PRN Willard Vijay, DO   10 mL at 01/23/21 0925    acetaminophen (TYLENOL) tablet 650 mg  650 mg Oral Q4H PRN Willard Vijay, DO        nicotine (NICODERM CQ) 14 MG/24HR 1 patch  1 patch Transdermal Daily Willard Vijay, DO   1 patch at 01/23/21 0752    atorvastatin (LIPITOR) tablet 80 mg  80 mg Oral Nightly Willard Vijay, DO   80 mg at 01/22/21 2310    aspirin EC tablet 81 mg  81 mg Oral Daily Willard Vijay, DO   81 mg at 01/23/21 0747    glucose (GLUTOSE) 40 % oral gel 15 g  15 g Oral PRN Gustabo Frost MD        dextrose 50 % IV solution  12.5 g Intravenous PRN Gustabo Frost MD        glucagon (rDNA) injection 1 mg  1 mg Intramuscular PRN Gustabo Frost MD        dextrose 5 % solution  100 mL/hr Intravenous PRN Gustabo Frost MD           No Known Allergies    ROS:   Constitutional                  Negative for fever and chills   HEENT                            Negative for ear discharge, ear pain, nosebleed  Eyes                                Negative for photophobia, pain and discharge  Respiratory                      Negative for hemoptysis and sputum  Cardiovascular                Negative for orthopnea, claudication and PND  Gastrointestinal               Negative for abdominal pain, diarrhea, blood in stool  Musculoskeletal               Negative for joint pain, negative for myalgia  Skin                                 Negative for rash or itching  Endo/heme/allergies       Negative for polydipsia, environmental allergy  Psychiatric                       Negative for suicidal ideation. Patient is not anxious    Vitals:    21 1315   BP: (!) 159/90   Pulse: 59   Resp: 16   Temp: 98.1 °F (36.7 °C)   SpO2: 95%     Admission weight: 230 lb (104.3 kg)    Neurological Examination  Constitutional .General exam well groomed   Head/ Ears /Nose/Throat/external ear . Normal exam  Neck and thyroid . Normal size. No bruits  Respiratory . Breathsounds clear bilaterally  Cardiovascular: Auscultation of heart with regular rate and rhythm   Musculoskeletal. Muscle bulk and tone normal                                                           Muscle strength 5/5 strength throughout                                                                                No dysmetria or dysdiadokinesis  No tremor   Normal fine motor  Gait normal   Orientation Alert and oriented x 3   Attention and concentration normal  Short term memory normal  Language process and speech normal . No aphasia   Cranial nerve 2 normal acuety and visual fields  Cranial nerve 3, 4 and 6 . Extraocular muscles are intact . Pupils are equal and reactive   Cranial nerve 5 . Intact corneal reflex. Decreased facial sensation right lower face   Cranial nerve 7 right lower facial droop . Partial ability to close right eye .  right frontalis contraction   Cranial nerve 8. Grossly intact hearing   Cranial nerve 9 and 10. Symmetric palate elevation   Cranial nerve 11 , 5 out of 5 strength   Cranial Nerve 12 midline tongue .  No atrophy  Sensation  Normal pinprick and light touch   Deep Tendon Reflexes normal  Plantar response flexor bilaterally    Assessment :    Right Gipson's palsy . Hyperlipidemia     Plan:    Valtrex 1 gram po tid x 1 week . Wear eye patch at night along with artificial tears qid to right eye . Okay to discharge . FU in office in 4 weeks . No prednisone taper with diabetes mellitus . Lipitor 80 mg po qd .  FU office in 4 weeks

## 2021-01-23 NOTE — ED NOTES
Report given to Karley Mercer RN from [facility]. Report method by phone   The following was reviewed with receiving RN:   Current vital signs:  BP (!) 145/98   Pulse 70   Temp 98 °F (36.7 °C) (Oral)   Resp 14   Wt 230 lb (104.3 kg)   SpO2 91%   BMI 31.19 kg/m²                MEWS Score: 1     Any medication or safety alerts were reviewed. Any pending diagnostics and notifications were also reviewed, as well as any safety concerns or issues, abnormal labs, abnormal imaging, and abnormal assessment findings. Questions were answered.             Esvin Bauman RN  01/22/21 2017

## 2021-01-24 LAB
EKG ATRIAL RATE: 74 BPM
EKG P AXIS: 53 DEGREES
EKG P-R INTERVAL: 142 MS
EKG Q-T INTERVAL: 422 MS
EKG QRS DURATION: 84 MS
EKG QTC CALCULATION (BAZETT): 468 MS
EKG T AXIS: 48 DEGREES
EKG VENTRICULAR RATE: 74 BPM
ESTIMATED AVERAGE GLUCOSE: 255 MG/DL
HBA1C MFR BLD: 10.5 % (ref 4–6)

## 2021-01-24 PROCEDURE — 93010 ELECTROCARDIOGRAM REPORT: CPT | Performed by: INTERNAL MEDICINE

## 2021-01-25 ENCOUNTER — TELEPHONE (OUTPATIENT)
Dept: NEUROLOGY | Age: 51
End: 2021-01-25

## 2021-01-25 NOTE — TELEPHONE ENCOUNTER
Called patient to set up hospital follow up.  Wife answered and was going to have [place] call back to set up appt

## 2022-10-28 ENCOUNTER — TRANSCRIBE ORDERS (OUTPATIENT)
Dept: ADMINISTRATIVE | Age: 52
End: 2022-10-28

## 2022-10-28 DIAGNOSIS — I73.9 CLAUDICATION (HCC): Primary | ICD-10-CM

## 2022-11-03 ENCOUNTER — HOSPITAL ENCOUNTER (EMERGENCY)
Age: 52
Discharge: LEFT BEFORE DISCHARGE | End: 2022-11-03
Attending: EMERGENCY MEDICINE
Payer: COMMERCIAL

## 2022-11-03 ENCOUNTER — HOSPITAL ENCOUNTER (OUTPATIENT)
Dept: VASCULAR LAB | Age: 52
Discharge: HOME | End: 2022-11-03
Payer: COMMERCIAL

## 2022-11-03 VITALS
SYSTOLIC BLOOD PRESSURE: 173 MMHG | TEMPERATURE: 97.2 F | HEART RATE: 88 BPM | OXYGEN SATURATION: 98 % | DIASTOLIC BLOOD PRESSURE: 92 MMHG | RESPIRATION RATE: 18 BRPM

## 2022-11-03 DIAGNOSIS — I73.9 CLAUDICATION (HCC): ICD-10-CM

## 2022-11-03 DIAGNOSIS — I73.9 CLAUDICATION IN PERIPHERAL VASCULAR DISEASE (HCC): Primary | ICD-10-CM

## 2022-11-03 PROBLEM — I70.222 CRITICAL LIMB ISCHEMIA OF LEFT LOWER EXTREMITY WITH REST PAIN (HCC): Status: ACTIVE | Noted: 2022-11-03

## 2022-11-03 LAB
ABSOLUTE EOS #: 0.13 K/UL (ref 0–0.44)
ABSOLUTE IMMATURE GRANULOCYTE: 0.1 K/UL (ref 0–0.3)
ABSOLUTE LYMPH #: 2.57 K/UL (ref 1.1–3.7)
ABSOLUTE MONO #: 0.67 K/UL (ref 0.1–1.2)
ANION GAP SERPL CALCULATED.3IONS-SCNC: 10 MMOL/L (ref 9–17)
BASOPHILS # BLD: 1 % (ref 0–2)
BASOPHILS ABSOLUTE: 0.06 K/UL (ref 0–0.2)
BUN BLDV-MCNC: 14 MG/DL (ref 6–20)
CALCIUM SERPL-MCNC: 9.5 MG/DL (ref 8.6–10.4)
CHLORIDE BLD-SCNC: 100 MMOL/L (ref 98–107)
CO2: 24 MMOL/L (ref 20–31)
CREAT SERPL-MCNC: 0.45 MG/DL (ref 0.7–1.2)
EOSINOPHILS RELATIVE PERCENT: 1 % (ref 1–4)
GFR SERPL CREATININE-BSD FRML MDRD: >60 ML/MIN/1.73M2
GLUCOSE BLD-MCNC: 173 MG/DL (ref 70–99)
HCT VFR BLD CALC: 44 % (ref 40.7–50.3)
HEMOGLOBIN: 15 G/DL (ref 13–17)
IMMATURE GRANULOCYTES: 1 %
LYMPHOCYTES # BLD: 25 % (ref 24–43)
MCH RBC QN AUTO: 31.4 PG (ref 25.2–33.5)
MCHC RBC AUTO-ENTMCNC: 34.1 G/DL (ref 28.4–34.8)
MCV RBC AUTO: 92.2 FL (ref 82.6–102.9)
MONOCYTES # BLD: 7 % (ref 3–12)
NRBC AUTOMATED: 0 PER 100 WBC
PDW BLD-RTO: 12.1 % (ref 11.8–14.4)
PLATELET # BLD: 255 K/UL (ref 138–453)
PMV BLD AUTO: 9 FL (ref 8.1–13.5)
POTASSIUM SERPL-SCNC: 4.4 MMOL/L (ref 3.7–5.3)
RBC # BLD: 4.77 M/UL (ref 4.21–5.77)
SEG NEUTROPHILS: 65 % (ref 36–65)
SEGMENTED NEUTROPHILS ABSOLUTE COUNT: 6.63 K/UL (ref 1.5–8.1)
SODIUM BLD-SCNC: 134 MMOL/L (ref 135–144)
WBC # BLD: 10.2 K/UL (ref 3.5–11.3)

## 2022-11-03 PROCEDURE — 99283 EMERGENCY DEPT VISIT LOW MDM: CPT

## 2022-11-03 PROCEDURE — 80048 BASIC METABOLIC PNL TOTAL CA: CPT

## 2022-11-03 PROCEDURE — 93923 UPR/LXTR ART STDY 3+ LVLS: CPT

## 2022-11-03 PROCEDURE — 99204 OFFICE O/P NEW MOD 45 MIN: CPT | Performed by: STUDENT IN AN ORGANIZED HEALTH CARE EDUCATION/TRAINING PROGRAM

## 2022-11-03 PROCEDURE — 85025 COMPLETE CBC W/AUTO DIFF WBC: CPT

## 2022-11-03 ASSESSMENT — ENCOUNTER SYMPTOMS
COLOR CHANGE: 1
FACIAL SWELLING: 0
SINUS PRESSURE: 0
CHEST TIGHTNESS: 0
WHEEZING: 0
DIARRHEA: 0
ABDOMINAL PAIN: 0
TROUBLE SWALLOWING: 0
VOMITING: 0
NAUSEA: 0
COUGH: 0
SINUS PAIN: 0
SORE THROAT: 0
CONSTIPATION: 0
SHORTNESS OF BREATH: 0

## 2022-11-03 ASSESSMENT — PAIN SCALES - GENERAL: PAINLEVEL_OUTOF10: 10

## 2022-11-03 ASSESSMENT — PAIN - FUNCTIONAL ASSESSMENT: PAIN_FUNCTIONAL_ASSESSMENT: 0-10

## 2022-11-03 NOTE — PROGRESS NOTES
11/03/2022 At 1:30pm. Patient informed that he needs to be seen at an Emergency room as soon as possible. Gave him the option of [facility] or [facility]. Patient headed to [facility] with daughter driving him.

## 2022-11-03 NOTE — ED PROVIDER NOTES
[facility]  ED  Emergency Department Encounter  Emergency Medicine Resident     Pt Name: Sacha Jha  MRN: [id number]  [place] 1970  Date of evaluation: 11/3/22  PCP:  Elizabeth Guillory MD    CHIEF COMPLAINT       Chief Complaint   Patient presents with    Leg Pain    Leg Swelling     Bilateral x3 weeks        HISTORY OFPRESENT ILLNESS  (Location/Symptom, Timing/Onset, Context/Setting, Quality, Duration, Modifying Factors,Severity.)      Sacha Jha is a 50 y. o.yo male who presents with concerns for left leg pain and swelling was sent here from [facility]. Patient reportedly has had leg pain for the last few weeks for which his primary care doctor has been working about 4. Patient was started on nerve pain medication and ABIs were ordered. Patient does have history of smoking. Patient was at [facility] getting his ABIs performed today when he was told to come straight to the emergency department for vascular evaluation. Patient does not have the results with him, states they were concerned about the results of the left leg. Patient states that is when it has been bothering him, bilateral legs with edema and pain even though the left 1 is worse. Patient states initially he had numbness in the left leg but that has resolved today. He denies any chest or abdominal pain or discomfort.     PAST MEDICAL / SURGICAL / SOCIAL / FAMILY HISTORY      has a past medical history of Acute bronchitis with bronchospasm, Acute periodontitis, Adhesive capsulitis of right shoulder, Anxiety, Blurry vision, Carbuncle of neck, Caries, Chest pain, Constipation, Cough, Diastasis of muscle, Difficulty breathing, Drug dependence (HCC), Fatigue, Glycosuria, Hyperlipidemia, Hypothyroid, Opioid abuse, episodic (HCC), Opioid dependence (HCC), Opioid withdrawal (HCC), Osteoarthritis, Otitis media, Pain, joint, shoulder, Periodontal abscess, Pharyngitis, Pneumonia, Polydipsia, Rhinitis, Sciatica, Shoulder fracture, right, Tobacco abuse counseling, Tooth pain, Trapezius muscle strain, Type 2 diabetes mellitus, uncontrolled ([address] and Wheezing. has no past surgical history on file. Social History     Socioeconomic History    Marital status:      Spouse name: Not on file    Number of children: Not on file    Years of education: Not on file    Highest education level: Not on file   Occupational History    Not on file   Tobacco Use    Smoking status: Every Day     Packs/day: 1.50     Years: 25.00     Pack years: 37.50     Types: Cigarettes    Smokeless tobacco: Never   Substance and Sexual Activity    Alcohol use: Not on file    Drug use: Not on file    Sexual activity: Not on file   Other Topics Concern    Not on file   Social History Narrative    Not on file     Social Determinants of Health     Financial Resource Strain: Not on file   Food Insecurity: Not on file   Transportation Needs: Not on file   Physical Activity: Not on file   Stress: Not on file   Social Connections: Not on file   Intimate Partner Violence: Not on file   Housing Stability: Not on file       No family history on file. Allergies:  Patient has no known allergies. Home Medications:  Prior to Admission medications    Medication Sig Start Date End Date Taking?  Authorizing Provider   nicotine (NICODERM CQ) 14 MG/24HR Place 1 patch onto the skin daily 1/24/21   Miroslava Shaw MD   atorvastatin (LIPITOR) 80 MG tablet Take 1 tablet by mouth nightly 1/23/21   Miroslava Shaw MD   Liraglutide (VICTOZA) 18 MG/3ML SOPN SC injection Inject 1.2 mg into the skin daily 1/23/21   Miroslava Shaw MD   metFORMIN (GLUCOPHAGE) 1000 MG tablet Take 1 tablet by mouth 2 times daily (with meals) 1/23/21   Miroslava Shaw MD   sertraline (ZOLOFT) 50 MG tablet Take 50 mg by mouth daily    Historical Provider, MD   insulin glargine (LANTUS;BASAGLAR) 100 UNIT/ML injection pen Inject 80 Units into the skin 2 times daily    Historical Provider, MD   amLODIPine (NORVASC) 5 MG tablet Take 5 mg by mouth daily    Historical Provider, MD   methadone 10 MG/5ML solution Take 120 mg by mouth daily. Historical Provider, MD   Insulin Pen Needle 32G X 4 MM MISC 1 each by Does not apply route daily 4/8/16   Rajni Estrada MD   glucose blood VI test strips (ASCENSIA AUTODISC VI;ONE TOUCH ULTRA TEST VI) strip 1 each by In Vitro route 2 times daily As needed. Historical Provider, MD   ibuprofen (ADVIL;MOTRIN) 800 MG tablet Take 800 mg by mouth 3 times daily as needed for Pain    Historical Provider, MD   Lancets MISC 1 each by Does not apply route 2 times daily    Historical Provider, MD       REVIEW OFSYSTEMS    (2-9 systems for level 4, 10 or more for level 5)      Review of Systems   Constitutional:  Negative for chills, diaphoresis, fatigue and fever. HENT:  Negative for facial swelling, nosebleeds, sinus pressure, sinus pain, sore throat and trouble swallowing. Respiratory:  Negative for cough, chest tightness, shortness of breath and wheezing. Cardiovascular:  Negative for chest pain, palpitations and leg swelling. Gastrointestinal:  Negative for abdominal pain, constipation, diarrhea, nausea and vomiting. Endocrine: Negative for polydipsia, polyphagia and polyuria. Genitourinary:  Negative for dysuria, flank pain and hematuria. Musculoskeletal:  Positive for arthralgias and gait problem. Negative for neck pain and neck stiffness. Skin:  Positive for color change. Negative for rash and wound. Neurological:  Negative for dizziness, syncope, weakness, light-headedness, numbness and headaches. PHYSICAL EXAM   (up to 7 for level 4, 8 or more forlevel 5)      INITIAL VITALS:   ED Triage Vitals   BP Temp Temp src Pulse Resp SpO2 Height Weight   173/92 97.2 ORAL 88 18 98% -- --       Physical Exam  Constitutional:       General: He is not in acute distress. Appearance: He is obese.    HENT:      Head: Normocephalic and atraumatic. Right Ear: External ear normal.      Left Ear: External ear normal.      Nose: Nose normal.   Eyes:      General: No scleral icterus. Extraocular Movements: Extraocular movements intact. Conjunctiva/sclera: Conjunctivae normal.      Pupils: Pupils are equal, round, and reactive to light. Cardiovascular:      Rate and Rhythm: Normal rate and regular rhythm. Pulses: Normal pulses. Heart sounds: Normal heart sounds. Pulmonary:      Effort: Pulmonary effort is normal. No respiratory distress. Breath sounds: Normal breath sounds. Abdominal:      General: Abdomen is flat. Bowel sounds are normal. There is no distension. Palpations: Abdomen is soft. Tenderness: There is no abdominal tenderness. Musculoskeletal:         General: Normal range of motion. Cervical back: Normal range of motion. No muscular tenderness. Comments: Bilateral lower extremities with chronic venous stasis changes present, left lower extremity 3+ pitting edema, right lower extremity with 2+ pitting edema. Palpable DPs bilaterally. Skin:     General: Skin is warm and dry. Neurological:      General: No focal deficit present. Mental Status: He is alert and oriented to person, place, and time. Cranial Nerves: No cranial nerve deficit. DIFFERENTIAL  DIAGNOSIS     PLAN (LABS / IMAGING / EKG):  Orders Placed This Encounter   Procedures    CBC with Auto Differential    Basic Metabolic Panel w/ Reflex to MG    Inpatient consult to Vascular Surgery       MEDICATIONS ORDERED:  No orders of the defined types were placed in this encounter. DDX: Claudication, peripheral vascular disease    Initial MDM/Plan: 50 y.o. male who presents with concerns for abnormal testing done at [facility] today. Patient has had left leg pain for the last 3 weeks, is a smoker concern for peripheral artery disease versus claudication. Had ABIs performed and was told to come straight here. Will reach out to vascular as I am unable to access DONTA results at this time. Patient does have palpable DPs bilaterally    DIAGNOSTIC RESULTS / EMERGENCYDEPARTMENT COURSE / MDM     LABS:  Labs Reviewed   CBC WITH AUTO DIFFERENTIAL - Abnormal; Notable for the following components:       Result Value    Immature Granulocytes 1 (*)     All other components within normal limits   BASIC METABOLIC PANEL W/ REFLEX TO MG FOR LOW K - Abnormal; Notable for the following components:    Glucose 173 (*)     Creatinine 0.45 (*)     Sodium 134 (*)     All other components within normal limits         RADIOLOGY:  VL Ankle Art Brachial Indices Extremity Bilateral    Result Date: 11/3/2022    [facility]  Vascular Lower Arterial Plethysmography Procedure   Patient Name     Reed Soliman        Date of Study             11/03/2022                   Thao Rooney   Date of Birth    1970    Gender                    Male   Age              50 year(s)    Race                         Room Number      OP   Corporate ID #   [id number]   Patient Acct #   [de-identified]   MR #             [id number]        Vickie Valente   Accession #      [id number]    Interpreting Physician    Camilla Peña   Referring Nurse                Referring Physician       Woody Lefort  Practitioner  Procedure Type of Study:   Extremities Arteries: Lower Arterial Plethysmography, PVR Ankle. Indications for Study:Claudication. Patient Status:Out Patient. Technical Quality:Adequate visualization. Conclusions   Summary   Arterial plethysmography and segmental systolic pressures have been  performed in both lower extremity digits at rest.   Right: No evidence of arterial insufficiency. Left: Evidence of severe ischemia.    Signature   ----------------------------------------------------------------  Electronically signed by Shaggy Cain(Sonographer) on  11/03/2022 01:40 PM ----------------------------------------------------------------   ----------------------------------------------------------------  Electronically signed by Evan Swenson(Interpreting physician)  on 11/03/2022 05:13 PM  ----------------------------------------------------------------  Findings:   Right Impression:         Left Impression:   No significant arterial   DONTA indicates severe ischemia with patient  insufficiency noted. complaining of severe pain. Patients foot is                            cold to touch. Normal waveforms noted in  digits. No waveforms noted in digits. Velocities are measured in cm/s ; Diameters are measured in cm Pressures +------------+----+------------+---------+--------+------------+-----------+ !            ! !Right       ! ! Left    !            !           ! +------------+----+------------+---------+--------+------------+-----------+ ! Location    ! !Pressure    ! Ratio    ! !Pressure    ! Ratio      ! +------------+----+------------+---------+--------+------------+-----------+ ! Thigh       !    !171         !1.06     !        !145         !0.9        ! +------------+----+------------+---------+--------+------------+-----------+ ! Calf        !    !190         !1.17     !        !93          !0.57       ! +------------+----+------------+---------+--------+------------+-----------+ ! Ankle PT    !    !170         !1.05     !        !37          !0.23       ! +------------+----+------------+---------+--------+------------+-----------+ ! Ankle DP    !    !158         ! 0.98     !        !41          !0.25       ! +------------+----+------------+---------+--------+------------+-----------+   - Brachial Pressure:Right: 162. Left:159.   - DONTA:Right: 1.05. Left: 0.25. Right Plethysmographic Results +---------------+---------------+------------+------------+----------------+ ! Right          !               !            !            !                ! +---------------+---------------+------------+------------+----------------+ ! Location       ! Pressure       ! Ratio       ! Notch       ! Amplitude       ! +---------------+---------------+------------+------------+----------------+ ! Calf           !190            !1.17        !            !                ! +---------------+---------------+------------+------------+----------------+ ! Ankle PT       !170            !1.05        !            !                ! +---------------+---------------+------------+------------+----------------+ ! Ankle DP       ! 158            ! 0.98        !            !                ! +---------------+---------------+------------+------------+----------------+   - Right Brachial Pressure:162.   - Right DONTA:1.05. Left Plethysmographic Results +---------------+---------------+------------+------------+----------------+ ! Left           !               !            !            !                ! +---------------+---------------+------------+------------+----------------+ ! Location       ! Pressure       ! Ratio       ! Notch       ! Amplitude       ! +---------------+---------------+------------+------------+----------------+ ! Calf           !93             !0.57        !            !                ! +---------------+---------------+------------+------------+----------------+ ! Ankle PT       !37             !0.23        !            !                ! +---------------+---------------+------------+------------+----------------+ ! Ankle DP       !41             !0.25        !            !                ! +---------------+---------------+------------+------------+----------------+   - Left Brachial Pressure:159.   - Left DONTA:0.25.        EKG      All EKG's are interpreted by the Emergency Department Physicianwho either signs or Co-signs this chart in the absence of a cardiologist.    EMERGENCY DEPARTMENT COURSE:  ED Course as of 11/03/22 1810   Thu Nov 03, 2022   1618 Patient seen and mood in the on room arrival.  Was sent here from [facility] after an DONTA was abnormal.  Will reach out to vascular surgery. Does have an intact DP pulse [CD]   1728 Vascular at bedside  [CD]   1737 Labs unremarkable  [CD]   4993 Spoke with vascular surgery, they are recommending admission for strict glucose control and angio patient is refusing admission. Will discuss AMA forms with patient [CD]   [phone] Patient would like to sign out against medical advice. I had an extensive discussion with pt regarding risks of leaving including, but not limited to worsening symptoms, permanent disability or death. Advised pt that they may return at any time for continued evaluation and treatment. Pt indicated understanding and acceptance of these risks and still wishes to sign out AMA. [CD]      ED Course User Index  [CD] Mela Currie DO          PROCEDURES:  None    CONSULTS:  IP CONSULT TO VASCULAR SURGERY    CRITICAL CARE:  20 minutes    FINAL IMPRESSION      1.  Claudication in peripheral vascular disease [facility])          DISPOSITION / PLAN     DISPOSITION [place] 11/03/2022 05:55:18 PM      PATIENT REFERRED TO:  Myron Barker, [address] [id number]    Call in 1 day  For ER follow-up    [facility] ED  [address] [place] [address]  [phone]  Go to   If symptoms worsen    DISCHARGE MEDICATIONS:  New Prescriptions    No medications on file       Mela Currie DO  Emergency Medicine Resident    (Please note that portions of this note were completed with a voice recognition program.Efforts were made to edit the dictations but occasionally words are mis-transcribed.)        Mela Currie DO  Resident  11/03/22 5465

## 2022-11-03 NOTE — ED NOTES
This patient was assessed by the doctor only.  Nurse processed and completed the orders from the doctor ie labs, meds, and/or EKG       Shonda Mendoza RN  11/03/22 7970

## 2022-11-03 NOTE — DISCHARGE INSTRUCTIONS
Thank you for visiting [facility] Emergency Department. It was recommended you be admitted to the hospital for strict glucose control and angio with vascular surgery within the next few days. Please call vascular surgery first thing to arrange follow up     You need to call Pema Simon MD to make an appointment as directed for follow up. Should you have any questions regarding your care or further treatment, please call [address] [place] [address] Emergency Department at [phone].

## 2022-11-03 NOTE — ED PROVIDER NOTES
[facility]     Emergency Department     Faculty Note/ Attestation      Pt Name: Marisa Carolina                                       MRN: [id number]  [place] 1970  Date of evaluation: 11/3/2022    Patients PCP:    Salvatore Carreon MD      Attestation  I performed a history and physical examination of the patient and discussed management with the resident. I reviewed the residents note and agree with the documented findings and plan of care. Any areas of disagreement are noted on the chart. I was personally present for the key portions of any procedures. I have documented in the chart those procedures where I was not present during the key portions. I have reviewed the emergency nurses triage note. I agree with the chief complaint, past medical history, past surgical history, allergies, medications, social and family history as documented unless otherwise noted below. For Physician Assistant/ Nurse Practitioner cases/documentation I have personally evaluated this patient and have completed at least one if not all key elements of the E/M (history, physical exam, and MDM). Additional findings are as noted.       Initial Screens:             Vitals:    Vitals:    11/03/22 1622   BP: (!) 173/92   Pulse: 88   Resp: 18   Temp: 97.2 °F (36.2 °C)   TempSrc: Oral   SpO2: 98%       CHIEF COMPLAINT       Chief Complaint   Patient presents with    Leg Pain    Leg Swelling     Bilateral x3 weeks              DIAGNOSTIC RESULTS             RADIOLOGY:   No orders to display         LABS:  Labs Reviewed   CBC WITH AUTO DIFFERENTIAL   BASIC METABOLIC PANEL W/ REFLEX TO MG FOR LOW K         EMERGENCY DEPARTMENT COURSE:     -------------------------  BP: (!) 173/92, Temp: 97.2 °F (36.2 °C), Heart Rate: 88, Resp: 18      Comments    Patient is a long term smoker, he has had lower extremity swelling and exertional pain for 3 weeks, was at [facility] earlier for ABIs, he was referred directly to the ER. He says he is having burning and numbness to the left lower extremity, feels cool however his legs are warm bilaterally. States that they feel better dangling over the bed, worse with walking. Concerning for claudication versus vascular insufficiency. We will reach out to vascular surgery, likely will need further imaging    Does have palpable femoral and DP pulses bilaterally, warm and well-perfused, good cap refill. And was for admission, vascular surgery is seen and well perform procedure within the next day or 2, patient decided he wanted to go home since they were not doing surgery emergently.   We explained risks and benefits, we offered him admission and pain control, he decided to leave [address]    (Please note that portions of this note were completed with a voice recognition program.  Efforts were made to edit the dictations but occasionally words are mis-transcribed.)      Natacha Kelsey MD,, MD  Attending Emergency Physician         Natacha Kelsey MD  11/10/22 4817

## 2022-11-03 NOTE — CONSULTS
VASCULAR SURGERY  CONSULT NOTE    PATIENT: Yoel Contreras           : 1970  MRN: [id number]  ADMISSION DATE: 11/3/2022  3:57 PM   TODAY'S DATE: 22     ATTENDING PHYSICIAN: José Luis Nichols MD  CONSULTING PHYSICIAN: Dr. Deidra Noel:  Abnormal ABIs    HISTORY OF PRESENT ILLNESS:  Pt is a 50year old male who was having ABIs measured today when the vascular tech advised him to go to the emergency department. His family medicine physician had ordered the ABIs for left lower extremity pain with walking. Pt reports left lower extremity pain that began about one month ago and has gotten progressively worse. He mows lawns a lot and has been having to rest more while walking than in the past. He reports some pain to his right leg but the majority of his pain is on the left side. He has pain in his calf and heel with walking that improves with rest. He reports he cannot walk the length of a football field without resting because of the pain. The pain has recently started to occur at rest in his left foot and has been more constant for the last week and a half. Pt has also had skin changes to his shins bilaterally with brawny discoloration. He has to sleep on the couch with his feet touching the ground because it is too painful to lay flat. He does have sensory and motor sensation. Pt reports smoking up to 3 packs of cigarettes per day for a long time. He takes Methodone daily and has history of Diabetes on insulin and HTN on Lisinopril. Pt has not seen a vascular surgeon before and denies any surgical history.     PAST MEDICAL HISTORY:        Diagnosis Date    Acute bronchitis with bronchospasm     Acute periodontitis     Adhesive capsulitis of right shoulder     Anxiety     Blurry vision     Carbuncle of neck     Caries     Chest pain     Constipation     Cough     Diastasis of muscle     Difficulty breathing     Drug dependence (HCC)     Fatigue     Glycosuria     Hyperlipidemia Hypothyroid     Opioid abuse, episodic (HCC)     Opioid dependence (HCC)     Opioid withdrawal (HCC)     Osteoarthritis     Otitis media     Pain, joint, shoulder     Periodontal abscess     Pharyngitis     Pneumonia     Polydipsia     Rhinitis     Sciatica     Shoulder fracture, right     Tobacco abuse counseling     Tooth pain     Trapezius muscle strain     Type 2 diabetes mellitus, uncontrolled ([address]     Wheezing        PAST SURGICAL HISTORY:    No past surgical history on file. ALLERGIES:    Patient has no known allergies. SOCIAL HISTORY   Social History     Tobacco Use    Smoking status: Every Day     Packs/day: 1.50     Years: 25.00     Pack years: 37.50     Types: Cigarettes    Smokeless tobacco: Never        FAMILY HISTORY:   No family history on file. MEDICATIONS PRIOR TO ADMISSION:   Not in a hospital admission. REVIEW OF SYSTEMS:    CONSTITUTIONAL: Denies weight loss, fever and chills  HEENT: Denies changes in vision and hearing  RESP: Denies SOB and cough  CV: Denies palpitations and CP  GI: Denies abdominal pain, nausea, vomiting and diarrhea  : Denies dysuria and urinary frequency  MSK: Left calf and leg pain  SKIN: Skin darkening on bilateral shins  NEURO: Denies headache and syncope  PSYCH: Denies recent changes in mood. Denies anxiety and depression    PHYSICAL EXAM:    Temp  Av.2 °F (36.2 °C)  Min: 97.2 °F (36.2 °C)  Max: 97.2 °F (98.7 °C)  Systolic (20RUC), CML:331 , Min:173 , DZV:835   Diastolic (98RES), IGI:84, Min:92, Max:92  Pulse  Av  Min: 88  Max: 88  Resp  Av  Min: 18  Max: 18  SpO2: 98 % SpO2  Av %  Min: 98 %  Max: 98 %     General: No acute distress. A&Ox3. HEENT:  NC/AT. Conjunctiva moist without icterus. Ears are symmetric. Nares are patent. Oral mucus membranes are moist.  Neck:  Supple. No LAD. Cardiovascular:  Regular rate and rhythm. Warm, well perfused. Respiratory:  Equal chest rise bilaterally.  No wheezes, stridor, or increased work of breathing. Abdomen:  Soft, non tender, non distended. No organomegaly. No masses palpated. Neuro: Motor and sensory grossly intact. Extremities:  Warm, dry. Right foot with DP and PT signals, left foot with L DP signal, absent PT signal. Bilateral legs with 2+ pitting edema and brawny discoloration of the anterior shins bilaterally. LABS:  No results for input(s): WBC, HGB, PLT in the last 72 hours. No results for input(s): NA, K, CL, CO2, BUN, CREATININE, GLUCOSE in the last 72 hours. No results for input(s): AST, ALT, ALB, ALKPHOS, BILITOT, BILIDIR in the last 72 hours. No results for input(s): APTT, PROT, INR in the last 72 hours. IMAGING:  No results found. ASSESSMENT   50 yr old male with left lower extremity claudication as well as early rest pain. Pt has motor and sensation, has had pain for one month now and the foot is warm. Pt has uncontrolled diabetes with last A1c of 10. PLAN  -Recommend admission to medicine team for strict glucose control and blood pressure control  -We recommend patient have an angiogram in the next couple days. We discussed the importance of an angiogram to diagnose his vessel disease and to plan intervention. Pt seems intent on going home and calling the office tomorrow. We advised patient to stay in the hospital for intervention sooner however if he does leave we advised him to return to the ED if he has increased pain, change in color or temperature to the left foot.    -Recommend strict glucose control  -Recommend ASA and Statin, to be managed by his primary care physician  -Discussed the importance of smoking cessation    Shannon Cancino DO PGY-2  11/3/22 4:33 PM

## 2022-11-11 ENCOUNTER — HOSPITAL ENCOUNTER (OUTPATIENT)
Age: 52
Setting detail: OUTPATIENT SURGERY
Discharge: HOME | End: 2022-11-11
Attending: STUDENT IN AN ORGANIZED HEALTH CARE EDUCATION/TRAINING PROGRAM
Payer: COMMERCIAL

## 2022-11-11 VITALS
HEART RATE: 82 BPM | OXYGEN SATURATION: 95 % | DIASTOLIC BLOOD PRESSURE: 87 MMHG | BODY MASS INDEX: 32.74 KG/M2 | SYSTOLIC BLOOD PRESSURE: 144 MMHG | HEIGHT: 72 IN | RESPIRATION RATE: 14 BRPM | TEMPERATURE: 99.8 F

## 2022-11-11 DIAGNOSIS — I70.222 CRITICAL LIMB ISCHEMIA OF LEFT LOWER EXTREMITY WITH REST PAIN (HCC): Primary | ICD-10-CM

## 2022-11-11 LAB — GLUCOSE BLD-MCNC: 115 MG/DL (ref 75–110)

## 2022-11-11 PROCEDURE — 82947 ASSAY GLUCOSE BLOOD QUANT: CPT

## 2022-11-11 RX ORDER — HYDROCODONE BITARTRATE AND ACETAMINOPHEN 5; 325 MG/1; MG/1
1 TABLET ORAL EVERY 4 HOURS PRN
Qty: 18 TABLET | Refills: 0 | Status: SHIPPED | OUTPATIENT
Start: 2022-11-11 | End: 2022-11-14

## 2022-11-11 RX ORDER — HYDROCHLOROTHIAZIDE 12.5 MG/1
12.5 CAPSULE, GELATIN COATED ORAL EVERY MORNING
COMMUNITY

## 2022-11-11 RX ORDER — LISINOPRIL 20 MG/1
20 TABLET ORAL DAILY
COMMUNITY

## 2022-11-11 RX ORDER — GABAPENTIN 300 MG/1
300 CAPSULE ORAL 3 TIMES DAILY
COMMUNITY
End: 2022-12-01 | Stop reason: ALTCHOICE

## 2022-11-11 RX ORDER — SODIUM CHLORIDE 9 MG/ML
INJECTION, SOLUTION INTRAVENOUS CONTINUOUS
Status: DISCONTINUED | OUTPATIENT
Start: 2022-11-11 | End: 2022-11-14 | Stop reason: HOSPADM

## 2022-11-11 RX ORDER — DEXTROSE MONOHYDRATE 25 G/50ML
12.5 INJECTION, SOLUTION INTRAVENOUS ONCE
Status: DISCONTINUED | OUTPATIENT
Start: 2022-11-11 | End: 2022-11-14 | Stop reason: HOSPADM

## 2022-11-11 NOTE — PROGRESS NOTES
Patient admitted, consent signed and questions answered. Patient ready for procedure. Call light to reach with side rails up 2 of 2. Family at bedside with patient. History and physical needs completed.

## 2022-11-11 NOTE — H&P
VASCULAR SURGERY H&P  [address]      Patient's Name/ Date of Birth/ Gender: Amada Guan / 1970 (50 y.o.) / male     Surgeon: Dr. Mariia Lowe    History of present Illness: Pt is a 50 y.o. male presents today for left lower extremity angiogram. Pt reports left lower extremity pain that began about one month ago and has gotten progressively worse. He mows lawns a lot and has been having to rest more while walking than in the past. He reports some pain to his right leg but the majority of his pain is on the left side. He has pain in his calf and heel with walking that improves with rest. He reports he cannot walk the length of a football field without resting because of the pain. On exam he has motor and sensation intact in both feet. He has palpable radial and femoral pulses bilaterally. He has weakly palpable right pedal pulses but no palpable left pedal pulses. There is bilateral lower extremity edema, pitting. He has left DP signal. There are no wounds present in either foot. They are both warm to touch at this time. Past Medical History:  has a past medical history of Acute bronchitis with bronchospasm, Acute periodontitis, Adhesive capsulitis of right shoulder, Anxiety, Blurry vision, Carbuncle of neck, Caries, Chest pain, Constipation, Cough, Diastasis of muscle, Difficulty breathing, Drug dependence (HCC), Fatigue, Glycosuria, Hyperlipidemia, Hypothyroid, Methadone dependence ([address] Opioid abuse, episodic (HCC), Opioid dependence (HCC), Opioid withdrawal (HCC), Osteoarthritis, Otitis media, Pain, joint, shoulder, Periodontal abscess, Pharyngitis, Pneumonia, Polydipsia, PVD (peripheral vascular disease) ([address] Rhinitis, Sciatica, Shoulder fracture, right, Tobacco abuse counseling, Tooth pain, Trapezius muscle strain, Type 2 diabetes mellitus, uncontrolled, and Wheezing.     Past Surgical History:   Past Surgical History:   Procedure Laterality Date    VASCULAR SURGERY  11/11/2022 LLE ANGIOGRAM       Social History:  reports that he has been smoking. He has a 37.50 pack-year smoking history. He has never used smokeless tobacco.    Family History: family history is not on file. Review of Systems:   General: Left leg pain, bilateral leg swelling  HEENT: Denies sore throat, sinus problems, allergic rhinosinusitis  Card: Denies chest pain, palpitations, orthopnea  Pulm: Denies cough, shortness of breath  GI: denies history of constipation, diarrhea, hematochezia or melena  : Denies polyuria, dysuria, hematuria  Endo: Reports diabetes  Heme: Denies anemia, h/o bleeding or clotting problems. Neuro: Denies h/o CVA, TIA  Skin: Denies rashes, ulcers  Musculoskeletal: Denies muscle, joint, back pain. Allergies: Patient has no known allergies. Current Meds:  Current Facility-Administered Medications:     0.9 % sodium chloride infusion, , IntraVENous, Continuous, Kalee Dean MD    Vital Signs:  Vitals:    11/11/22 1355   BP: (!) 144/87   Pulse: 82   Resp: 14   Temp: 99.8 °F (37.7 °C)   SpO2: 95%       Physical Exam:  General: No acute distress. A&Ox3. HEENT:  NC/AT. Conjunctiva moist without icterus. Ears are symmetric. Nares are patent. Oral mucus membranes are moist.  Neck:  Supple. No LAD. Cardiovascular:  Regular rate and rhythm. Warm, well perfused. Respiratory:  Equal chest rise bilaterally. No wheezes, stridor, or increased work of breathing. Abdomen:  Soft, non tender, non distended. No organomegaly. No masses palpated. Neuro: Motor and sensory grossly intact. Extremities:  Warm, dry. Right foot with DP and PT signals, left foot with DP signal, absent PT signal. Bilateral legs with 2+ pitting edema and brawny discoloration of the anterior shins bilaterally.      Labs:   Lab Results   Component Value Date/Time    WBC 10.2 11/03/2022 04:50 PM    HGB 15.0 11/03/2022 04:50 PM    HCT 44.0 11/03/2022 04:50 PM    MCV 92.2 11/03/2022 04:50 PM     11/03/2022 04:50 PM     Lab Results   Component Value Date/Time     11/03/2022 04:50 PM    K 4.4 11/03/2022 04:50 PM     11/03/2022 04:50 PM    CO2 24 11/03/2022 04:50 PM    BUN 14 11/03/2022 04:50 PM    CREATININE 0.45 11/03/2022 04:50 PM    GLUCOSE 173 11/03/2022 04:50 PM    CALCIUM 9.5 11/03/2022 04:50 PM     Lab Results   Component Value Date/Time    ALKPHOS 88 01/23/2021 06:46 AM    ALT 20 01/23/2021 06:46 AM    AST 16 01/23/2021 06:46 AM    PROT 7.3 01/23/2021 06:46 AM    BILITOT 0.43 01/23/2021 06:46 AM    LABALBU 3.9 01/23/2021 06:46 AM     No results found for: LACTA  No results found for: AMYLASE  No results found for: LIPASE  Lab Results   Component Value Date/Time    INR 1.0 01/23/2021 06:46 AM     Impression:  50 yr old male with left lower extremity claudication as well as early rest pain. Pt has motor and sensation, has had pain for one month now and the foot is warm. Pt has uncontrolled diabetes with last A1c of 10. Recommendation:  Plan for left lower extremity angiogram today. All risks and benefits were discussed with the patient and questions answered.  Pt consented to proceed    Yelena Vega, DO PGY-2  11/11/22 2:44 PM

## 2022-11-11 NOTE — PROGRESS NOTES
All discharge instructions reviewed, questions answered, paper signed and given copy. Patient discharged per ambulatory with spouse and belongings.

## 2022-11-11 NOTE — PROGRESS NOTES
Pt. Called out \"My blood sugar is dropping\" Pt. BS 59 then 69. Writer phoned Dr. Kendra Gonzalez to update. Verbal order received case will be rescheduled until pt. Conditions under better control. Orders received. Writer in to update pt. Pt. OK with rescheduling and requesting something to eat and drink. Pt. Provided boxed lunch. Will monitor.

## 2022-11-18 ENCOUNTER — HOSPITAL ENCOUNTER (OUTPATIENT)
Dept: CARDIAC CATH/INVASIVE PROCEDURES | Age: 52
Discharge: HOME | End: 2022-11-18
Attending: SURGERY | Admitting: SURGERY
Payer: COMMERCIAL

## 2022-11-18 ENCOUNTER — HOSPITAL ENCOUNTER (OUTPATIENT)
Dept: CT IMAGING | Age: 52
Discharge: HOME | End: 2022-11-20
Attending: SURGERY
Payer: COMMERCIAL

## 2022-11-18 VITALS
WEIGHT: 222 LBS | HEART RATE: 72 BPM | DIASTOLIC BLOOD PRESSURE: 67 MMHG | BODY MASS INDEX: 30.07 KG/M2 | OXYGEN SATURATION: 97 % | RESPIRATION RATE: 15 BRPM | SYSTOLIC BLOOD PRESSURE: 140 MMHG | TEMPERATURE: 97.8 F | HEIGHT: 72 IN

## 2022-11-18 LAB — GLUCOSE BLD-MCNC: 186 MG/DL (ref 75–110)

## 2022-11-18 PROCEDURE — 36140 INTRO NDL ICATH UPR/LXTR ART: CPT

## 2022-11-18 PROCEDURE — C1769 GUIDE WIRE: HCPCS

## 2022-11-18 PROCEDURE — 6360000004 HC RX CONTRAST MEDICATION

## 2022-11-18 PROCEDURE — 6360000004 HC RX CONTRAST MEDICATION: Performed by: SURGERY

## 2022-11-18 PROCEDURE — 75635 CT ANGIO ABDOMINAL ARTERIES: CPT

## 2022-11-18 PROCEDURE — 82947 ASSAY GLUCOSE BLOOD QUANT: CPT

## 2022-11-18 PROCEDURE — C1892 INTRO/SHEATH,FIXED,PEEL-AWAY: HCPCS

## 2022-11-18 PROCEDURE — 7100000000 HC PACU RECOVERY - FIRST 15 MIN

## 2022-11-18 PROCEDURE — 7100000001 HC PACU RECOVERY - ADDTL 15 MIN

## 2022-11-18 PROCEDURE — 2709999900 HC NON-CHARGEABLE SUPPLY

## 2022-11-18 PROCEDURE — C1894 INTRO/SHEATH, NON-LASER: HCPCS

## 2022-11-18 PROCEDURE — 2580000003 HC RX 258: Performed by: SURGERY

## 2022-11-18 PROCEDURE — 6360000002 HC RX W HCPCS

## 2022-11-18 RX ORDER — 0.9 % SODIUM CHLORIDE 0.9 %
100 INTRAVENOUS SOLUTION INTRAVENOUS ONCE
Status: COMPLETED | OUTPATIENT
Start: 2022-11-18 | End: 2022-11-18

## 2022-11-18 RX ORDER — SODIUM CHLORIDE 9 MG/ML
INJECTION, SOLUTION INTRAVENOUS CONTINUOUS
Status: DISCONTINUED | OUTPATIENT
Start: 2022-11-18 | End: 2022-11-19 | Stop reason: HOSPADM

## 2022-11-18 RX ORDER — SODIUM CHLORIDE 0.9 % (FLUSH) 0.9 %
10 SYRINGE (ML) INJECTION 2 TIMES DAILY
Status: DISCONTINUED | OUTPATIENT
Start: 2022-11-18 | End: 2022-11-21 | Stop reason: HOSPADM

## 2022-11-18 RX ORDER — HYDROCODONE BITARTRATE AND ACETAMINOPHEN 5; 325 MG/1; MG/1
1 TABLET ORAL EVERY 6 HOURS PRN
COMMUNITY

## 2022-11-18 RX ADMIN — SODIUM CHLORIDE 100 ML: 9 INJECTION, SOLUTION INTRAVENOUS at 10:57

## 2022-11-18 RX ADMIN — Medication 10 ML: at 10:58

## 2022-11-18 RX ADMIN — IOPAMIDOL 125 ML: 755 INJECTION, SOLUTION INTRAVENOUS at 10:57

## 2022-11-18 ASSESSMENT — PAIN DESCRIPTION - DESCRIPTORS: DESCRIPTORS: ACHING;CRUSHING

## 2022-11-18 ASSESSMENT — PAIN DESCRIPTION - ORIENTATION: ORIENTATION: RIGHT;LEFT

## 2022-11-18 ASSESSMENT — PAIN DESCRIPTION - PAIN TYPE: TYPE: CHRONIC PAIN

## 2022-11-18 ASSESSMENT — PAIN SCALES - GENERAL: PAINLEVEL_OUTOF10: 8

## 2022-11-18 ASSESSMENT — PAIN DESCRIPTION - LOCATION: LOCATION: LEG

## 2022-11-18 NOTE — PROGRESS NOTES
Patient admitted, consent signed and questions answered. Patient ready for procedure. Call light to reach with side rails up 2 of 2. Bilat groin hairs clipped with writer and Gabriela Jefferson present. Wife at bedside with patient. History and physical needing update.
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
## 2022-11-18 NOTE — PROGRESS NOTES
To CT scan per cart at 1030 and returns at 1110 am with right groin site remaining clean soft and dry.

## 2022-11-18 NOTE — PROGRESS NOTES
Procedure cancelled and Dr Borden Court in to speak with patient and wife concerning CT results and plan of care.   All questions answered and patient discharged to home with all belongings with wife per wheelchair

## 2022-11-18 NOTE — OP NOTE
Operative Note      Patient: Che Rosario  YOB: 1970  MRN: [id number]    Date of Procedure: 11/18/2022    Pre-Op Diagnosis: Left leg pain, Peripheral artrial disease    Post-Op Diagnosis: Same       Procedure: US guided vascular access of right common femoral artery    Surgeon: Dr. Philippe Merida    Anesthesia: Local, IV sedation    Estimated Blood Loss (mL): 3 ml    Complications: none    Specimens: none    Implants: none      Drains: None    Findings: Patient unable to tolerate secondary to pain, despite local anesthesia and IV sedation. Severe pain with access with micropuncture needle and wire. Decision made to cancel procedure and obtain CTA aorta with lower extremity runoff. Based on those finding will plan for operative planning. Keep NPO. Detailed Description of Procedure:     Mr. Lennox Lauren was brought to the catheterization suite for PAD and leg pain. After appropriate timeout performed, bilateral groin sites prepped and draped in standard sterile fashion. I began by evaluating the right common femoral artery under ultrasound, it was patent and compressible. Local anesthesia delivered and access obtained under ultrasound guidance with micropuncture needle. Wire passed easily. Patient had severe pain with this maneuver. Decision made to pull the wire and needle and abort procedure, due to severity of his pain tolerance. Will obtain CTA aorta with runoff and make plans for operative repair as needed.       Electronically signed by Gio Dimas MD on 11/18/2022 at 9:49 AM

## 2022-11-18 NOTE — PROGRESS NOTES
Patient returns to [facility] room 8 with unsuccessful attempt at [facility] angiogram / procedure cancelled. Band aid to right groin clean soft and dry. HOB elevated and remains NPO. Dr Jayna Casas in to speak with wife. Awaiting CT scan.   Call light to reach

## 2022-11-18 NOTE — DISCHARGE INSTRUCTIONS
SEDATION / ANALGESIA INFORMATION / [address] 87 have received the sedation/analgesia medication during your visit    Sedation/analgesia is used during short medical procedures under controlled supervision. The medication will produce a strong relaxation. You will be able to hear, speak and follow instructions, but your memory and alertness will be decreased. You will be able to swallow and breathe on your own. During sedation/analgesia your blood pressure, heart and breathing will be watched closely. After the procedure, you may not remember what was said or done. You may have the following effects from the medication. \" Drowsiness, dizziness, sleepiness or confusion. \" Difficulty remembering or delayed reaction times. \" Loss of fine muscle control or difficulty with your balance especially while walking. \" Difficulty focusing or blurred vision. You may not be aware of slight changes in your behavior and/or your reaction time because of the medication used during the procedure. Therefore you should follow these instructions. \" Have someone responsible help you with your care. \" Do not drive for 24 hours. \" Do not operate equipment for 24 hours (lawnmowers, power tools, kitchen accessories, stove). \" Do not drink any alcoholic beverages for a minimum of 24 hours. \" Do not make important personal, legal or business decisions for 24 hours. \" You may experience dizziness or lightheadedness. Move slowly and carefully, do not make sudden position changes. \" Drink extra amounts of fluids today. \" Increase your diet as tolerated (unless you have received specific instructions from your doctor). \" If you feel nauseated, continue with liquids until the nausea is gone. \" Notify your physician if you have not urinated within 8 hours after the procedure. \" Resume your medications unless otherwise instructed.

## 2022-11-21 ENCOUNTER — TELEPHONE (OUTPATIENT)
Dept: VASCULAR SURGERY | Age: 52
End: 2022-11-21

## 2022-11-21 NOTE — TELEPHONE ENCOUNTER
Patients wife called wondering if he needs to be scheduled to come in for a consult or does he need surgery?

## 2022-12-01 ENCOUNTER — OFFICE VISIT (OUTPATIENT)
Dept: VASCULAR SURGERY | Age: 52
End: 2022-12-01
Payer: COMMERCIAL

## 2022-12-01 VITALS
WEIGHT: 222 LBS | RESPIRATION RATE: 18 BRPM | HEART RATE: 94 BPM | BODY MASS INDEX: 30.07 KG/M2 | HEIGHT: 72 IN | TEMPERATURE: 97.5 F | OXYGEN SATURATION: 98 % | SYSTOLIC BLOOD PRESSURE: 150 MMHG | DIASTOLIC BLOOD PRESSURE: 84 MMHG

## 2022-12-01 DIAGNOSIS — M79.89 SWELLING OF BOTH LOWER EXTREMITIES: ICD-10-CM

## 2022-12-01 DIAGNOSIS — I73.9 PAD (PERIPHERAL ARTERY DISEASE) (HCC): ICD-10-CM

## 2022-12-01 DIAGNOSIS — I74.3 POPLITEAL ARTERY EMBOLISM, LEFT (HCC): Primary | ICD-10-CM

## 2022-12-01 DIAGNOSIS — I70.222 CRITICAL LIMB ISCHEMIA OF LEFT LOWER EXTREMITY WITH REST PAIN (HCC): ICD-10-CM

## 2022-12-01 PROCEDURE — 99214 OFFICE O/P EST MOD 30 MIN: CPT | Performed by: SURGERY

## 2022-12-01 PROCEDURE — 3078F DIAST BP <80 MM HG: CPT | Performed by: SURGERY

## 2022-12-01 PROCEDURE — 3074F SYST BP LT 130 MM HG: CPT | Performed by: SURGERY

## 2022-12-01 RX ORDER — FUROSEMIDE 20 MG/1
TABLET ORAL
COMMUNITY
Start: 2022-11-21

## 2022-12-01 RX ORDER — GABAPENTIN 600 MG/1
600 TABLET ORAL 3 TIMES DAILY
Qty: 90 TABLET | Refills: 1 | Status: SHIPPED | OUTPATIENT
Start: 2022-12-01 | End: 2022-12-31

## 2022-12-01 ASSESSMENT — ENCOUNTER SYMPTOMS
CHEST TIGHTNESS: 0
COLOR CHANGE: 1
SHORTNESS OF BREATH: 0
ABDOMINAL PAIN: 0
ALLERGIC/IMMUNOLOGIC NEGATIVE: 1
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
## 2022-12-07 ENCOUNTER — HOSPITAL ENCOUNTER (EMERGENCY)
Age: 52
Discharge: HOME | End: 2022-12-07
Attending: EMERGENCY MEDICINE
Payer: COMMERCIAL

## 2022-12-07 ENCOUNTER — TELEPHONE (OUTPATIENT)
Dept: VASCULAR SURGERY | Age: 52
End: 2022-12-07

## 2022-12-07 VITALS
HEART RATE: 74 BPM | DIASTOLIC BLOOD PRESSURE: 93 MMHG | WEIGHT: 222 LBS | RESPIRATION RATE: 13 BRPM | OXYGEN SATURATION: 99 % | TEMPERATURE: 97.1 F | SYSTOLIC BLOOD PRESSURE: 169 MMHG | HEIGHT: 72 IN | BODY MASS INDEX: 30.07 KG/M2

## 2022-12-07 DIAGNOSIS — I87.2 VENOUS INSUFFICIENCY OF LEFT LOWER EXTREMITY: ICD-10-CM

## 2022-12-07 DIAGNOSIS — T07.XXXA MULTIPLE SUPERFICIAL WOUNDS WITH INFECTION: Primary | ICD-10-CM

## 2022-12-07 DIAGNOSIS — L08.9 MULTIPLE SUPERFICIAL WOUNDS WITH INFECTION: Primary | ICD-10-CM

## 2022-12-07 LAB
ABSOLUTE EOS #: 0.2 K/UL (ref 0–0.4)
ABSOLUTE LYMPH #: 2.1 K/UL (ref 1–4.8)
ABSOLUTE MONO #: 0.8 K/UL (ref 0.1–1.3)
ANION GAP SERPL CALCULATED.3IONS-SCNC: 9 MMOL/L (ref 9–17)
BASOPHILS # BLD: 1 % (ref 0–2)
BASOPHILS ABSOLUTE: 0.1 K/UL (ref 0–0.2)
BUN BLDV-MCNC: 15 MG/DL (ref 6–20)
C-REACTIVE PROTEIN: 49.7 MG/L (ref 0–5)
CALCIUM SERPL-MCNC: 9.5 MG/DL (ref 8.6–10.4)
CHLORIDE BLD-SCNC: 99 MMOL/L (ref 98–107)
CO2: 30 MMOL/L (ref 20–31)
CREAT SERPL-MCNC: 0.55 MG/DL (ref 0.7–1.2)
EOSINOPHILS RELATIVE PERCENT: 1 % (ref 0–4)
GFR SERPL CREATININE-BSD FRML MDRD: >60 ML/MIN/1.73M2
GLUCOSE BLD-MCNC: 119 MG/DL (ref 70–99)
HCT VFR BLD CALC: 37.7 % (ref 41–53)
HEMOGLOBIN: 13.2 G/DL (ref 13.5–17.5)
LYMPHOCYTES # BLD: 20 % (ref 24–44)
MCH RBC QN AUTO: 32 PG (ref 26–34)
MCHC RBC AUTO-ENTMCNC: 35 G/DL (ref 31–37)
MCV RBC AUTO: 91.4 FL (ref 80–100)
MONOCYTES # BLD: 8 % (ref 1–7)
PDW BLD-RTO: 12.8 % (ref 11.5–14.9)
PLATELET # BLD: 355 K/UL (ref 150–450)
PMV BLD AUTO: 6.4 FL (ref 6–12)
POTASSIUM SERPL-SCNC: 4.1 MMOL/L (ref 3.7–5.3)
RBC # BLD: 4.12 M/UL (ref 4.5–5.9)
SEDIMENTATION RATE, ERYTHROCYTE: 24 MM/HR (ref 0–20)
SEG NEUTROPHILS: 70 % (ref 36–66)
SEGMENTED NEUTROPHILS ABSOLUTE COUNT: 7.5 K/UL (ref 1.3–9.1)
SODIUM BLD-SCNC: 138 MMOL/L (ref 135–144)
WBC # BLD: 10.6 K/UL (ref 3.5–11)

## 2022-12-07 PROCEDURE — 6360000002 HC RX W HCPCS: Performed by: STUDENT IN AN ORGANIZED HEALTH CARE EDUCATION/TRAINING PROGRAM

## 2022-12-07 PROCEDURE — 96374 THER/PROPH/DIAG INJ IV PUSH: CPT

## 2022-12-07 PROCEDURE — 86140 C-REACTIVE PROTEIN: CPT

## 2022-12-07 PROCEDURE — 80048 BASIC METABOLIC PNL TOTAL CA: CPT

## 2022-12-07 PROCEDURE — 6370000000 HC RX 637 (ALT 250 FOR IP): Performed by: STUDENT IN AN ORGANIZED HEALTH CARE EDUCATION/TRAINING PROGRAM

## 2022-12-07 PROCEDURE — 85652 RBC SED RATE AUTOMATED: CPT

## 2022-12-07 PROCEDURE — 85025 COMPLETE CBC W/AUTO DIFF WBC: CPT

## 2022-12-07 PROCEDURE — 36415 COLL VENOUS BLD VENIPUNCTURE: CPT

## 2022-12-07 PROCEDURE — 99284 EMERGENCY DEPT VISIT MOD MDM: CPT

## 2022-12-07 RX ORDER — OXYCODONE HYDROCHLORIDE 5 MG/1
10 TABLET ORAL ONCE
Status: COMPLETED | OUTPATIENT
Start: 2022-12-07 | End: 2022-12-07

## 2022-12-07 RX ORDER — DOXYCYCLINE HYCLATE 100 MG
100 TABLET ORAL 2 TIMES DAILY
Qty: 14 TABLET | Refills: 0 | Status: SHIPPED | OUTPATIENT
Start: 2022-12-07 | End: 2022-12-14

## 2022-12-07 RX ORDER — FENTANYL CITRATE 0.05 MG/ML
50 INJECTION, SOLUTION INTRAMUSCULAR; INTRAVENOUS ONCE
Status: COMPLETED | OUTPATIENT
Start: 2022-12-07 | End: 2022-12-07

## 2022-12-07 RX ORDER — OXYCODONE HYDROCHLORIDE 5 MG/1
10 TABLET ORAL EVERY 8 HOURS PRN
Qty: 9 TABLET | Refills: 0 | Status: SHIPPED | OUTPATIENT
Start: 2022-12-07 | End: 2022-12-10

## 2022-12-07 RX ADMIN — FENTANYL CITRATE 50 MCG: 0.05 INJECTION, SOLUTION INTRAMUSCULAR; INTRAVENOUS at 15:51

## 2022-12-07 RX ADMIN — OXYCODONE HYDROCHLORIDE 10 MG: 5 TABLET ORAL at 17:17

## 2022-12-07 ASSESSMENT — ENCOUNTER SYMPTOMS
CONSTIPATION: 0
COLOR CHANGE: 1
DIARRHEA: 0
NAUSEA: 0
EYE REDNESS: 0
ABDOMINAL PAIN: 0
SHORTNESS OF BREATH: 0
SINUS PRESSURE: 0
SORE THROAT: 0
VOMITING: 0
CHEST TIGHTNESS: 0
PHOTOPHOBIA: 0
COUGH: 0
RHINORRHEA: 0
EYE PAIN: 0

## 2022-12-07 ASSESSMENT — PAIN DESCRIPTION - DESCRIPTORS
DESCRIPTORS: BURNING
DESCRIPTORS: BURNING;SQUEEZING

## 2022-12-07 ASSESSMENT — PAIN DESCRIPTION - ORIENTATION
ORIENTATION: LEFT
ORIENTATION: LEFT

## 2022-12-07 ASSESSMENT — PAIN DESCRIPTION - LOCATION
LOCATION: ANKLE
LOCATION: LEG

## 2022-12-07 ASSESSMENT — PAIN SCALES - GENERAL
PAINLEVEL_OUTOF10: 10
PAINLEVEL_OUTOF10: 10

## 2022-12-07 NOTE — ED PROVIDER NOTES
EMERGENCY DEPARTMENT ENCOUNTER   ATTENDING ATTESTATION     Pt Name: Debra Chinchilla  MRN: [id number]  [place] 1970  Date of evaluation: 12/7/22       Debra Chinchilla is a 50 y.o. male who presents with Cellulitis      MDM: 81-year-old male presents for complaints of left lower extremity redness. He reports that he has a history of peripheral vascular disease reports that he sees Dr. Hilda Shah vascular surgery, he is scheduled to have a surgery soon, he reports that in the last day or so he has developed worsening redness of the lower extremity as well as increased pain. He denies any new numbness tingling or weakness to the extremity denies any significant swelling that he has noted.     On initial exam patient in no acute distress vitals are stable he is noted to have superficial wounds to the left lower extremity redness is noted from the foot to the mid calf, no significant warmth to palpation, he does have a palpable right lower extremity DP pulse, DP pulses is detected with Doppler of the left lower extremity, he does take Xarelto, doubt DVT at this time, concern for possible cellulitis    Will check basic labs and discussed with patient surgeon    Labs reviewed he is noted have mild elevation of his sed rate, remaining labs were unremarkable    Discussed with Dr. Maia Ahumada from vascular surgery, who stated that patient can be admitted for pain control and further work-up versus outpatient work-up    Discussed with patient results, discussed admission for further treatment and work-up versus outpatient treatment, patient does not want to be admitted at this time, he has an appointment with with his surgeon tomorrow, we will start on course of antibiotic for concern for cellulitis    Discussed with patient need for follow-up with PCP, vascular surgery and strict return precautions, patient voiced understanding comfortable plan and discharge    Patient/Guardian was informed of their diagnosis and told to follow up with PCP & vascular surgery in 1-3 days. Patient demonstrates understanding and agreement with the plan. They were given the opportunity to ask questions and those questions were answered to the best of our ability with the available information. Patient/Guardian told to return to the ED for any new, worsening, changing or persistent symptoms. This dictation was prepared using [employer] voice recognition software. Vitals:   Vitals:    12/07/22 1416 12/07/22 1615   BP: (!) 153/79 (!) 169/93   Pulse: 77 74   Resp: 17 13   Temp: 97.1 °F (36.2 °C)    TempSrc: Temporal    SpO2: 98% 99%   Weight: 222 lb (100.7 kg)    Height: 6' (1.829 m)          I personally saw and examined the patient. I have reviewed and agree with the resident's findings, including all diagnostic interpretations and treatment plan as written. I was present for the key portions of any procedures performed and the inclusive time noted for any critical care statement. The care is provided during an unprecedented national emergency due to the novel coronavirus, COVID 19.   Susan Haskins DO  Attending Emergency Physician           Susan Haskins DO  12/07/22 9726
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
## 2022-12-07 NOTE — DISCHARGE INSTRUCTIONS
The wounds to your leg are likely due to the skull insufficiency issue that is previously diagnosed by vascular surgery. The worsening of redness is concerning for an infection. Take the antibiotic as prescribed for the full course unless instructed otherwise by vascular surgeon. Continue with your appointment with Dr. Bhumika Burks tomorrow. Return to the emergency department if begin experiencing worsening or severe pain but also fevers and chills, nausea or vomiting, dizziness or lightheadedness.

## 2022-12-07 NOTE — PLAN OF CARE
Called from [facility] ED to evaluate patient for increasing left leg discomfort. I discussed with the ED that patient should be admitted to hospital for further workup but he wants to leave right now before my evaluation and follow up as outpatient tomorrow with Dr. Jordan Watkins who has been managing his left popliteal artery occlusion.

## 2022-12-07 NOTE — TELEPHONE ENCOUNTER
Patient states hes in a lot of pain in his left foot still swelling has gone down just in a lot of pain and his skin feels very tight also. Says toes feel like they are bulging out.

## 2022-12-07 NOTE — ED NOTES
Mode of arrival (squad #, walk in, police, etc) : Walk in         Chief complaint(s): Cellulitis bilateral leg         Arrival Note (brief scenario, treatment PTA, etc). : Pt states leg pain x2 months. Pt has redness and swelling on the LT leg. Pt has trace redness and swelling on the rt leg. Pt is ambulatory to triage. Pt was told he had PAD in November. C= \"Have you ever felt that you should Cut down on your drinking? \"  No  A= \"Have people Annoyed you by criticizing your drinking? \"  No  G= \"Have you ever felt bad or Guilty about your drinking? \"  No  E= \"Have you ever had a drink as an Eye-opener first thing in the morning to steady your nerves or to help a hangover? \"  No      Deferred []      Reason for deferring: N/A    *If yes to two or more: probable alcohol abuse. Tena Chopra  12/07/22 4271

## 2022-12-10 NOTE — DISCHARGE INSTRUCTIONS
[address] [place] [facility]      Visit  Discharge Instructions / Physician Orders  DATE:12/13/22     Home Care:NONE     SUPPLIES ORDERED THRU:   NONE                  DATE LAST SUPPLIED     Wound Location:  Right lower leg                                  Left lower leg  Cleanse with: Liquid antibacterial soap and water, rinse well      Dressing Orders:  Right leg  Primary dressing    Triad cream to wounds  then apply saran wrap around wounds at night      Secondary dressing   light sock                                                          Left lower leg adaptic to wounds cover with abd pad and wrap with kerlix  Frequency:  nightly     Additional Orders: Increase protein to diet (meat, cheese, eggs, fish, peanut butter, nuts and beans)  Multivitamin daily  Go to ER if wounds worsen  OFFLOADING [] YES  TYPE:                  [] NA    Weekly wound care visits until determined otherwise. Antibiotic therapy-wound care related YES [] NO [x] NA[]    MY CHART []     Smart Device  []     HYPERBARIC TREATMENT-                TREATMENT #                          Your next appointment with the [address] [place] [address] is in call after speaking with Dr. Sarabjit Browning                                                                                                  (Please note your next appointment above and if you are unable to keep, kindly give a 24 hour notice. Thank you.)  If more than 15 min late we cannot guarantee you will be seen due to clinician schedule  Per Policy, Excessive cancellation will call for dismissal from program.  If you experience any of the following, please call the [address] [place] [address] during business hours:  [phone]     * Increase in Pain  * Temperature over 101  * Increase in drainage from your wound  * Drainage with a foul odor  * Bleeding  * Increase in swelling  * Need for compression bandage changes due to slippage, breakthrough drainage.      If you need medical attention outside of the business hours of the [address] [place] [address] Road please contact your PCP or go to the nearest emergency room. The information contained in the After Visit Summary has been reviewed with me, the patient and/or responsible adult, by my health care provider(s). I had the opportunity to ask questions regarding this information.  I have elected to receive;      []After Visit Summary  [x]Comprehensive Discharge Instruction      Patient signature______________________________________Date:________  Electronically signed by Alma Barbosa RN on 12/13/2022 at 11:24 AM   Electronically signed by Veronique Welsh DPM on 12/13/2022 at 11:27 AM

## 2022-12-12 ENCOUNTER — HOSPITAL ENCOUNTER (OUTPATIENT)
Dept: NON INVASIVE DIAGNOSTICS | Age: 52
Discharge: HOME | End: 2022-12-12
Payer: COMMERCIAL

## 2022-12-12 DIAGNOSIS — I74.3 POPLITEAL ARTERY EMBOLISM, LEFT (HCC): ICD-10-CM

## 2022-12-12 DIAGNOSIS — M79.89 SWELLING OF BOTH LOWER EXTREMITIES: ICD-10-CM

## 2022-12-12 PROCEDURE — 93306 TTE W/DOPPLER COMPLETE: CPT

## 2022-12-13 ENCOUNTER — OFFICE VISIT (OUTPATIENT)
Dept: VASCULAR SURGERY | Age: 52
End: 2022-12-13
Payer: COMMERCIAL

## 2022-12-13 ENCOUNTER — HOSPITAL ENCOUNTER (OUTPATIENT)
Dept: WOUND CARE | Age: 52
Discharge: HOME | End: 2022-12-13
Payer: COMMERCIAL

## 2022-12-13 VITALS
OXYGEN SATURATION: 96 % | RESPIRATION RATE: 17 BRPM | WEIGHT: 222 LBS | SYSTOLIC BLOOD PRESSURE: 131 MMHG | BODY MASS INDEX: 30.07 KG/M2 | DIASTOLIC BLOOD PRESSURE: 76 MMHG | HEART RATE: 82 BPM | HEIGHT: 72 IN

## 2022-12-13 VITALS
HEART RATE: 85 BPM | BODY MASS INDEX: 30.07 KG/M2 | DIASTOLIC BLOOD PRESSURE: 78 MMHG | SYSTOLIC BLOOD PRESSURE: 156 MMHG | RESPIRATION RATE: 13 BRPM | TEMPERATURE: 98.2 F | WEIGHT: 222 LBS | HEIGHT: 72 IN

## 2022-12-13 DIAGNOSIS — I73.9 PAD (PERIPHERAL ARTERY DISEASE) (HCC): Primary | ICD-10-CM

## 2022-12-13 DIAGNOSIS — R23.4 FISSURE IN SKIN OF FOOT: ICD-10-CM

## 2022-12-13 DIAGNOSIS — Z79.4 TYPE 2 DIABETES MELLITUS WITH DIABETIC PERIPHERAL ANGIOPATHY AND GANGRENE, WITH LONG-TERM CURRENT USE OF INSULIN (HCC): ICD-10-CM

## 2022-12-13 DIAGNOSIS — E11.52 TYPE 2 DIABETES MELLITUS WITH DIABETIC PERIPHERAL ANGIOPATHY AND GANGRENE, WITH LONG-TERM CURRENT USE OF INSULIN (HCC): ICD-10-CM

## 2022-12-13 DIAGNOSIS — I74.3 POPLITEAL ARTERY EMBOLISM, LEFT (HCC): ICD-10-CM

## 2022-12-13 DIAGNOSIS — I96 GANGRENE OF LEFT FOOT (HCC): ICD-10-CM

## 2022-12-13 DIAGNOSIS — M79.89 SWELLING OF BOTH LOWER EXTREMITIES: ICD-10-CM

## 2022-12-13 DIAGNOSIS — I70.222 CRITICAL LIMB ISCHEMIA OF LEFT LOWER EXTREMITY WITH REST PAIN (HCC): ICD-10-CM

## 2022-12-13 DIAGNOSIS — I73.9 PAD (PERIPHERAL ARTERY DISEASE) (HCC): ICD-10-CM

## 2022-12-13 DIAGNOSIS — F17.210 CIGARETTE NICOTINE DEPENDENCE WITHOUT COMPLICATION: ICD-10-CM

## 2022-12-13 DIAGNOSIS — I74.3 POPLITEAL ARTERY EMBOLISM, LEFT (HCC): Primary | ICD-10-CM

## 2022-12-13 PROCEDURE — 99204 OFFICE O/P NEW MOD 45 MIN: CPT | Performed by: PODIATRIST

## 2022-12-13 PROCEDURE — 3078F DIAST BP <80 MM HG: CPT | Performed by: SURGERY

## 2022-12-13 PROCEDURE — 3074F SYST BP LT 130 MM HG: CPT | Performed by: SURGERY

## 2022-12-13 PROCEDURE — 99214 OFFICE O/P EST MOD 30 MIN: CPT | Performed by: SURGERY

## 2022-12-13 PROCEDURE — 99214 OFFICE O/P EST MOD 30 MIN: CPT

## 2022-12-13 RX ORDER — ASPIRIN 81 MG/1
81 TABLET ORAL DAILY
COMMUNITY

## 2022-12-13 ASSESSMENT — PAIN SCALES - GENERAL: PAINLEVEL_OUTOF10: 10

## 2022-12-13 ASSESSMENT — PAIN DESCRIPTION - DESCRIPTORS: DESCRIPTORS: BURNING

## 2022-12-13 ASSESSMENT — PAIN DESCRIPTION - PAIN TYPE: TYPE: CHRONIC PAIN

## 2022-12-13 ASSESSMENT — PAIN DESCRIPTION - ORIENTATION: ORIENTATION: LEFT;RIGHT

## 2022-12-13 ASSESSMENT — PAIN DESCRIPTION - LOCATION: LOCATION: LEG

## 2022-12-13 NOTE — PLAN OF CARE
Problem: Chronic Conditions and Co-morbidities  Goal: Patient's chronic conditions and co-morbidity symptoms are monitored and maintained or improved  Outcome: Progressing     Problem: Discharge Planning  Goal: Discharge to home or other facility with appropriate resources  Outcome: Progressing     Problem: Pain  Goal: Verbalizes/displays adequate comfort level or baseline comfort level  Outcome: Progressing     Problem: ABCDS Injury Assessment  Goal: Absence of physical injury  Outcome: Progressing  Flowsheets (Taken 12/13/2022 1040 by Jeremiah Cervantes RN)  Absence of Physical Injury: Implement safety measures based on patient assessment     Problem: Arterial:  Goal: Optimize blood flow for wound healing  Description: Optimize blood flow for wound healing  Outcome: Progressing
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
## 2022-12-15 ASSESSMENT — ENCOUNTER SYMPTOMS
CHEST TIGHTNESS: 0
COLOR CHANGE: 1
ABDOMINAL PAIN: 0
SHORTNESS OF BREATH: 0
ALLERGIC/IMMUNOLOGIC NEGATIVE: 1
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
## 2022-12-18 ENCOUNTER — ANESTHESIA EVENT (OUTPATIENT)
Dept: SURGICAL ICU | Age: 52
End: 2022-12-18
Payer: COMMERCIAL

## 2022-12-19 ENCOUNTER — ANESTHESIA (OUTPATIENT)
Dept: SURGICAL ICU | Age: 52
End: 2022-12-19
Payer: COMMERCIAL

## 2022-12-19 ENCOUNTER — ANESTHESIA (OUTPATIENT)
Dept: OPERATING ROOM | Age: 52
End: 2022-12-19
Payer: COMMERCIAL

## 2022-12-19 ENCOUNTER — ANESTHESIA EVENT (OUTPATIENT)
Dept: OPERATING ROOM | Age: 52
End: 2022-12-19
Payer: COMMERCIAL

## 2022-12-19 ENCOUNTER — HOSPITAL ENCOUNTER (INPATIENT)
Age: 52
LOS: 2 days | Discharge: HOME | End: 2022-12-21
Attending: SURGERY | Admitting: SURGERY
Payer: COMMERCIAL

## 2022-12-19 DIAGNOSIS — Z95.828 S/P FEMORAL-POPLITEAL BYPASS SURGERY: ICD-10-CM

## 2022-12-19 DIAGNOSIS — I99.8 LIMB ISCHEMIA: Primary | ICD-10-CM

## 2022-12-19 DIAGNOSIS — I96 GANGRENE OF LEFT FOOT (HCC): ICD-10-CM

## 2022-12-19 LAB
ABSOLUTE EOS #: <0.03 K/UL (ref 0–0.44)
ABSOLUTE IMMATURE GRANULOCYTE: 0.08 K/UL (ref 0–0.3)
ABSOLUTE LYMPH #: 1.72 K/UL (ref 1.1–3.7)
ABSOLUTE MONO #: 0.53 K/UL (ref 0.1–1.2)
ANION GAP SERPL CALCULATED.3IONS-SCNC: 10 MMOL/L (ref 9–17)
BASOPHILS # BLD: 0 % (ref 0–2)
BASOPHILS ABSOLUTE: 0.03 K/UL (ref 0–0.2)
BUN BLDV-MCNC: 12 MG/DL (ref 6–20)
CALCIUM SERPL-MCNC: 8.3 MG/DL (ref 8.6–10.4)
CHLORIDE BLD-SCNC: 104 MMOL/L (ref 98–107)
CO2: 23 MMOL/L (ref 20–31)
CREAT SERPL-MCNC: 0.59 MG/DL (ref 0.7–1.2)
EGFR, POC: >60 ML/MIN/1.73M2
EOSINOPHILS RELATIVE PERCENT: 0 % (ref 1–4)
GFR SERPL CREATININE-BSD FRML MDRD: >60 ML/MIN/1.73M2
GLUCOSE BLD-MCNC: 106 MG/DL (ref 74–100)
GLUCOSE BLD-MCNC: 113 MG/DL (ref 75–110)
GLUCOSE BLD-MCNC: 118 MG/DL (ref 70–99)
GLUCOSE BLD-MCNC: 121 MG/DL (ref 75–110)
GLUCOSE BLD-MCNC: 68 MG/DL (ref 75–110)
HCT VFR BLD CALC: 35.5 % (ref 40.7–50.3)
HEMOGLOBIN: 11.3 G/DL (ref 13–17)
IMMATURE GRANULOCYTES: 1 %
LYMPHOCYTES # BLD: 13 % (ref 24–43)
MCH RBC QN AUTO: 31.7 PG (ref 25.2–33.5)
MCHC RBC AUTO-ENTMCNC: 31.8 G/DL (ref 28.4–34.8)
MCV RBC AUTO: 99.4 FL (ref 82.6–102.9)
MONOCYTES # BLD: 4 % (ref 3–12)
NRBC AUTOMATED: 0 PER 100 WBC
PARTIAL THROMBOPLASTIN TIME: 31.6 SEC (ref 20.5–30.5)
PDW BLD-RTO: 12.3 % (ref 11.8–14.4)
PLATELET # BLD: 339 K/UL (ref 138–453)
PMV BLD AUTO: 8.5 FL (ref 8.1–13.5)
POC BUN: 15 MG/DL (ref 8–26)
POC CHLORIDE: 102 MMOL/L (ref 98–107)
POC CREATININE: 0.62 MG/DL (ref 0.51–1.19)
POC HEMATOCRIT: 38 % (ref 41–53)
POC HEMOGLOBIN: 12.8 G/DL (ref 13.5–17.5)
POC IONIZED CALCIUM: 1.19 MMOL/L (ref 1.15–1.33)
POC POTASSIUM: 4.1 MMOL/L (ref 3.5–4.5)
POC SODIUM: 140 MMOL/L (ref 138–146)
POTASSIUM SERPL-SCNC: 4.2 MMOL/L (ref 3.7–5.3)
RBC # BLD: 3.57 M/UL (ref 4.21–5.77)
SEG NEUTROPHILS: 82 % (ref 36–65)
SEGMENTED NEUTROPHILS ABSOLUTE COUNT: 11.07 K/UL (ref 1.5–8.1)
SODIUM BLD-SCNC: 137 MMOL/L (ref 135–144)
WBC # BLD: 13.4 K/UL (ref 3.5–11.3)

## 2022-12-19 PROCEDURE — 11045 DBRDMT SUBQ TISS EACH ADDL: CPT | Performed by: SURGERY

## 2022-12-19 PROCEDURE — 82565 ASSAY OF CREATININE: CPT

## 2022-12-19 PROCEDURE — 2500000003 HC RX 250 WO HCPCS: Performed by: STUDENT IN AN ORGANIZED HEALTH CARE EDUCATION/TRAINING PROGRAM

## 2022-12-19 PROCEDURE — 6360000002 HC RX W HCPCS: Performed by: ANESTHESIOLOGY

## 2022-12-19 PROCEDURE — 0HBNXZZ EXCISION OF LEFT FOOT SKIN, EXTERNAL APPROACH: ICD-10-PCS | Performed by: SURGERY

## 2022-12-19 PROCEDURE — 84520 ASSAY OF UREA NITROGEN: CPT

## 2022-12-19 PROCEDURE — 6360000002 HC RX W HCPCS

## 2022-12-19 PROCEDURE — 04CU3ZZ EXTIRPATION OF MATTER FROM LEFT PERONEAL ARTERY, PERCUTANEOUS APPROACH: ICD-10-PCS | Performed by: SURGERY

## 2022-12-19 PROCEDURE — 82435 ASSAY OF BLOOD CHLORIDE: CPT

## 2022-12-19 PROCEDURE — 84295 ASSAY OF SERUM SODIUM: CPT

## 2022-12-19 PROCEDURE — 80048 BASIC METABOLIC PNL TOTAL CA: CPT

## 2022-12-19 PROCEDURE — 6370000000 HC RX 637 (ALT 250 FOR IP): Performed by: STUDENT IN AN ORGANIZED HEALTH CARE EDUCATION/TRAINING PROGRAM

## 2022-12-19 PROCEDURE — 3600000003 HC SURGERY LEVEL 3 BASE: Performed by: SURGERY

## 2022-12-19 PROCEDURE — 82947 ASSAY GLUCOSE BLOOD QUANT: CPT

## 2022-12-19 PROCEDURE — 36415 COLL VENOUS BLD VENIPUNCTURE: CPT

## 2022-12-19 PROCEDURE — 3600000013 HC SURGERY LEVEL 3 ADDTL 15MIN: Performed by: SURGERY

## 2022-12-19 PROCEDURE — C1769 GUIDE WIRE: HCPCS | Performed by: SURGERY

## 2022-12-19 PROCEDURE — 3700000001 HC ADD 15 MINUTES (ANESTHESIA): Performed by: SURGERY

## 2022-12-19 PROCEDURE — 85730 THROMBOPLASTIN TIME PARTIAL: CPT

## 2022-12-19 PROCEDURE — 35571 ART BYP POP-TIBL-PRL-OTHER: CPT | Performed by: SURGERY

## 2022-12-19 PROCEDURE — 2580000003 HC RX 258: Performed by: SURGERY

## 2022-12-19 PROCEDURE — C1892 INTRO/SHEATH,FIXED,PEEL-AWAY: HCPCS | Performed by: SURGERY

## 2022-12-19 PROCEDURE — C1763 CONN TISS, NON-HUMAN: HCPCS | Performed by: SURGERY

## 2022-12-19 PROCEDURE — B41GYZZ FLUOROSCOPY OF LEFT LOWER EXTREMITY ARTERIES USING OTHER CONTRAST: ICD-10-PCS | Performed by: SURGERY

## 2022-12-19 PROCEDURE — 2709999900 HC NON-CHARGEABLE SUPPLY: Performed by: SURGERY

## 2022-12-19 PROCEDURE — C1894 INTRO/SHEATH, NON-LASER: HCPCS | Performed by: SURGERY

## 2022-12-19 PROCEDURE — 2580000003 HC RX 258: Performed by: STUDENT IN AN ORGANIZED HEALTH CARE EDUCATION/TRAINING PROGRAM

## 2022-12-19 PROCEDURE — 061N09Y BYPASS LEFT FEMORAL VEIN TO LOWER VEIN WITH AUTOLOGOUS VENOUS TISSUE, OPEN APPROACH: ICD-10-PCS | Performed by: SURGERY

## 2022-12-19 PROCEDURE — 2000000000 HC ICU R&B

## 2022-12-19 PROCEDURE — 04CN3ZZ EXTIRPATION OF MATTER FROM LEFT POPLITEAL ARTERY, PERCUTANEOUS APPROACH: ICD-10-PCS | Performed by: SURGERY

## 2022-12-19 PROCEDURE — 3700000000 HC ANESTHESIA ATTENDED CARE: Performed by: SURGERY

## 2022-12-19 PROCEDURE — 82330 ASSAY OF CALCIUM: CPT

## 2022-12-19 PROCEDURE — 11042 DBRDMT SUBQ TIS 1ST 20SQCM/<: CPT | Performed by: SURGERY

## 2022-12-19 PROCEDURE — 6360000002 HC RX W HCPCS: Performed by: SURGERY

## 2022-12-19 PROCEDURE — 85014 HEMATOCRIT: CPT

## 2022-12-19 PROCEDURE — 2500000003 HC RX 250 WO HCPCS

## 2022-12-19 PROCEDURE — 2580000003 HC RX 258: Performed by: ANESTHESIOLOGY

## 2022-12-19 PROCEDURE — 04UN3KZ SUPPLEMENT LEFT POPLITEAL ARTERY WITH NONAUTOLOGOUS TISSUE SUBSTITUTE, PERCUTANEOUS APPROACH: ICD-10-PCS | Performed by: SURGERY

## 2022-12-19 PROCEDURE — 6360000004 HC RX CONTRAST MEDICATION: Performed by: SURGERY

## 2022-12-19 PROCEDURE — 6360000002 HC RX W HCPCS: Performed by: STUDENT IN AN ORGANIZED HEALTH CARE EDUCATION/TRAINING PROGRAM

## 2022-12-19 PROCEDURE — A4217 STERILE WATER/SALINE, 500 ML: HCPCS | Performed by: SURGERY

## 2022-12-19 PROCEDURE — 85025 COMPLETE CBC W/AUTO DIFF WBC: CPT

## 2022-12-19 PROCEDURE — 84132 ASSAY OF SERUM POTASSIUM: CPT

## 2022-12-19 PROCEDURE — 6370000000 HC RX 637 (ALT 250 FOR IP): Performed by: SURGERY

## 2022-12-19 DEVICE — GRAFT BIO TISS W0.8XL8CM DECELLULARIZED BOV PERICARD PTCH: Type: IMPLANTABLE DEVICE | Site: LEG | Status: FUNCTIONAL

## 2022-12-19 RX ORDER — ROCURONIUM BROMIDE 10 MG/ML
INJECTION, SOLUTION INTRAVENOUS PRN
Status: DISCONTINUED | OUTPATIENT
Start: 2022-12-19 | End: 2022-12-19 | Stop reason: SDUPTHER

## 2022-12-19 RX ORDER — HEPARIN SODIUM 1000 [USP'U]/ML
INJECTION, SOLUTION INTRAVENOUS; SUBCUTANEOUS
Status: COMPLETED
Start: 2022-12-19 | End: 2022-12-19

## 2022-12-19 RX ORDER — FENTANYL CITRATE 50 UG/ML
50 INJECTION, SOLUTION INTRAMUSCULAR; INTRAVENOUS
Status: COMPLETED | OUTPATIENT
Start: 2022-12-19 | End: 2022-12-19

## 2022-12-19 RX ORDER — SODIUM CHLORIDE 0.9 % (FLUSH) 0.9 %
5-40 SYRINGE (ML) INJECTION PRN
Status: DISCONTINUED | OUTPATIENT
Start: 2022-12-19 | End: 2022-12-19 | Stop reason: HOSPADM

## 2022-12-19 RX ORDER — DEXTROSE MONOHYDRATE 25 G/50ML
25 INJECTION, SOLUTION INTRAVENOUS ONCE
Status: COMPLETED | OUTPATIENT
Start: 2022-12-19 | End: 2022-12-19

## 2022-12-19 RX ORDER — ONDANSETRON 2 MG/ML
4 INJECTION INTRAMUSCULAR; INTRAVENOUS EVERY 6 HOURS PRN
Status: DISCONTINUED | OUTPATIENT
Start: 2022-12-19 | End: 2022-12-21 | Stop reason: HOSPADM

## 2022-12-19 RX ORDER — KETAMINE HCL IN NACL, ISO-OSM 100MG/10ML
SYRINGE (ML) INJECTION PRN
Status: DISCONTINUED | OUTPATIENT
Start: 2022-12-19 | End: 2022-12-19 | Stop reason: SDUPTHER

## 2022-12-19 RX ORDER — MIDAZOLAM HYDROCHLORIDE 2 MG/2ML
1 INJECTION, SOLUTION INTRAMUSCULAR; INTRAVENOUS EVERY 10 MIN PRN
Status: DISCONTINUED | OUTPATIENT
Start: 2022-12-19 | End: 2022-12-19 | Stop reason: HOSPADM

## 2022-12-19 RX ORDER — PHENYLEPHRINE HCL IN 0.9% NACL 1 MG/10 ML
SYRINGE (ML) INTRAVENOUS PRN
Status: DISCONTINUED | OUTPATIENT
Start: 2022-12-19 | End: 2022-12-19 | Stop reason: SDUPTHER

## 2022-12-19 RX ORDER — OXYCODONE HYDROCHLORIDE 5 MG/1
5 TABLET ORAL EVERY 4 HOURS PRN
Status: DISCONTINUED | OUTPATIENT
Start: 2022-12-19 | End: 2022-12-20

## 2022-12-19 RX ORDER — HEPARIN SODIUM 1000 [USP'U]/ML
INJECTION, SOLUTION INTRAVENOUS; SUBCUTANEOUS PRN
Status: DISCONTINUED | OUTPATIENT
Start: 2022-12-19 | End: 2022-12-19 | Stop reason: SDUPTHER

## 2022-12-19 RX ORDER — ACETAMINOPHEN 500 MG
1000 TABLET ORAL EVERY 8 HOURS SCHEDULED
Status: DISCONTINUED | OUTPATIENT
Start: 2022-12-19 | End: 2022-12-21 | Stop reason: HOSPADM

## 2022-12-19 RX ORDER — SODIUM CHLORIDE 9 MG/ML
INJECTION, SOLUTION INTRAVENOUS CONTINUOUS
Status: DISCONTINUED | OUTPATIENT
Start: 2022-12-19 | End: 2022-12-19

## 2022-12-19 RX ORDER — EPHEDRINE SULFATE/0.9% NACL/PF 50 MG/5 ML
SYRINGE (ML) INTRAVENOUS PRN
Status: DISCONTINUED | OUTPATIENT
Start: 2022-12-19 | End: 2022-12-19 | Stop reason: SDUPTHER

## 2022-12-19 RX ORDER — PROPOFOL 10 MG/ML
INJECTION, EMULSION INTRAVENOUS PRN
Status: DISCONTINUED | OUTPATIENT
Start: 2022-12-19 | End: 2022-12-19 | Stop reason: SDUPTHER

## 2022-12-19 RX ORDER — SENNA AND DOCUSATE SODIUM 50; 8.6 MG/1; MG/1
1 TABLET, FILM COATED ORAL 2 TIMES DAILY
Status: DISCONTINUED | OUTPATIENT
Start: 2022-12-19 | End: 2022-12-21 | Stop reason: HOSPADM

## 2022-12-19 RX ORDER — ONDANSETRON 2 MG/ML
4 INJECTION INTRAMUSCULAR; INTRAVENOUS
Status: ACTIVE | OUTPATIENT
Start: 2022-12-19 | End: 2022-12-20

## 2022-12-19 RX ORDER — FENTANYL CITRATE 50 UG/ML
25 INJECTION, SOLUTION INTRAMUSCULAR; INTRAVENOUS EVERY 5 MIN PRN
Status: DISCONTINUED | OUTPATIENT
Start: 2022-12-19 | End: 2022-12-19

## 2022-12-19 RX ORDER — MIDAZOLAM HYDROCHLORIDE 1 MG/ML
INJECTION INTRAMUSCULAR; INTRAVENOUS PRN
Status: DISCONTINUED | OUTPATIENT
Start: 2022-12-19 | End: 2022-12-19 | Stop reason: SDUPTHER

## 2022-12-19 RX ORDER — GABAPENTIN 300 MG/1
300 CAPSULE ORAL EVERY 8 HOURS
Status: DISCONTINUED | OUTPATIENT
Start: 2022-12-19 | End: 2022-12-21

## 2022-12-19 RX ORDER — FENTANYL CITRATE 50 UG/ML
50 INJECTION, SOLUTION INTRAMUSCULAR; INTRAVENOUS
Status: CANCELLED | OUTPATIENT
Start: 2022-12-19

## 2022-12-19 RX ORDER — METHOCARBAMOL 750 MG/1
750 TABLET, FILM COATED ORAL EVERY 6 HOURS
Status: DISCONTINUED | OUTPATIENT
Start: 2022-12-19 | End: 2022-12-21 | Stop reason: HOSPADM

## 2022-12-19 RX ORDER — ASPIRIN 81 MG/1
81 TABLET ORAL DAILY
Status: DISCONTINUED | OUTPATIENT
Start: 2022-12-19 | End: 2022-12-21 | Stop reason: HOSPADM

## 2022-12-19 RX ORDER — SODIUM CHLORIDE 0.9 % (FLUSH) 0.9 %
5-40 SYRINGE (ML) INJECTION PRN
Status: DISCONTINUED | OUTPATIENT
Start: 2022-12-19 | End: 2022-12-21 | Stop reason: HOSPADM

## 2022-12-19 RX ORDER — ATORVASTATIN CALCIUM 80 MG/1
80 TABLET, FILM COATED ORAL NIGHTLY
Status: DISCONTINUED | OUTPATIENT
Start: 2022-12-19 | End: 2022-12-21 | Stop reason: HOSPADM

## 2022-12-19 RX ORDER — SODIUM CHLORIDE 9 MG/ML
INJECTION, SOLUTION INTRAVENOUS PRN
Status: DISCONTINUED | OUTPATIENT
Start: 2022-12-19 | End: 2022-12-19 | Stop reason: HOSPADM

## 2022-12-19 RX ORDER — INSULIN LISPRO 100 [IU]/ML
0-16 INJECTION, SOLUTION INTRAVENOUS; SUBCUTANEOUS
Status: DISCONTINUED | OUTPATIENT
Start: 2022-12-19 | End: 2022-12-21 | Stop reason: HOSPADM

## 2022-12-19 RX ORDER — DEXAMETHASONE SODIUM PHOSPHATE 10 MG/ML
INJECTION INTRAMUSCULAR; INTRAVENOUS PRN
Status: DISCONTINUED | OUTPATIENT
Start: 2022-12-19 | End: 2022-12-19 | Stop reason: SDUPTHER

## 2022-12-19 RX ORDER — FENTANYL CITRATE 50 UG/ML
25 INJECTION, SOLUTION INTRAMUSCULAR; INTRAVENOUS
Status: COMPLETED | OUTPATIENT
Start: 2022-12-19 | End: 2022-12-19

## 2022-12-19 RX ORDER — INSULIN LISPRO 100 [IU]/ML
0-4 INJECTION, SOLUTION INTRAVENOUS; SUBCUTANEOUS NIGHTLY
Status: DISCONTINUED | OUTPATIENT
Start: 2022-12-19 | End: 2022-12-21 | Stop reason: HOSPADM

## 2022-12-19 RX ORDER — LISINOPRIL 20 MG/1
20 TABLET ORAL DAILY
Status: DISCONTINUED | OUTPATIENT
Start: 2022-12-20 | End: 2022-12-21 | Stop reason: HOSPADM

## 2022-12-19 RX ORDER — SODIUM CHLORIDE 0.9 % (FLUSH) 0.9 %
5-40 SYRINGE (ML) INJECTION PRN
Status: DISCONTINUED | OUTPATIENT
Start: 2022-12-19 | End: 2022-12-19

## 2022-12-19 RX ORDER — ONDANSETRON 2 MG/ML
INJECTION INTRAMUSCULAR; INTRAVENOUS PRN
Status: DISCONTINUED | OUTPATIENT
Start: 2022-12-19 | End: 2022-12-19 | Stop reason: SDUPTHER

## 2022-12-19 RX ORDER — FENTANYL CITRATE 50 UG/ML
INJECTION, SOLUTION INTRAMUSCULAR; INTRAVENOUS PRN
Status: DISCONTINUED | OUTPATIENT
Start: 2022-12-19 | End: 2022-12-19 | Stop reason: SDUPTHER

## 2022-12-19 RX ORDER — SODIUM CHLORIDE 0.9 % (FLUSH) 0.9 %
5-40 SYRINGE (ML) INJECTION EVERY 12 HOURS SCHEDULED
Status: DISCONTINUED | OUTPATIENT
Start: 2022-12-19 | End: 2022-12-21 | Stop reason: HOSPADM

## 2022-12-19 RX ORDER — IODIXANOL 320 MG/ML
INJECTION, SOLUTION INTRAVASCULAR
Status: DISCONTINUED
Start: 2022-12-19 | End: 2022-12-19

## 2022-12-19 RX ORDER — LIDOCAINE HYDROCHLORIDE 10 MG/ML
INJECTION, SOLUTION EPIDURAL; INFILTRATION; INTRACAUDAL; PERINEURAL PRN
Status: DISCONTINUED | OUTPATIENT
Start: 2022-12-19 | End: 2022-12-19 | Stop reason: SDUPTHER

## 2022-12-19 RX ORDER — FENTANYL CITRATE 50 UG/ML
50 INJECTION, SOLUTION INTRAMUSCULAR; INTRAVENOUS EVERY 5 MIN PRN
Status: DISCONTINUED | OUTPATIENT
Start: 2022-12-19 | End: 2022-12-19

## 2022-12-19 RX ORDER — KETOROLAC TROMETHAMINE 15 MG/ML
15 INJECTION, SOLUTION INTRAMUSCULAR; INTRAVENOUS EVERY 6 HOURS
Status: DISCONTINUED | OUTPATIENT
Start: 2022-12-19 | End: 2022-12-21 | Stop reason: HOSPADM

## 2022-12-19 RX ORDER — HEPARIN SODIUM AND DEXTROSE 10000; 5 [USP'U]/100ML; G/100ML
500 INJECTION INTRAVENOUS CONTINUOUS
Status: DISCONTINUED | OUTPATIENT
Start: 2022-12-19 | End: 2022-12-21

## 2022-12-19 RX ORDER — ONDANSETRON 4 MG/1
4 TABLET, ORALLY DISINTEGRATING ORAL EVERY 8 HOURS PRN
Status: DISCONTINUED | OUTPATIENT
Start: 2022-12-19 | End: 2022-12-21 | Stop reason: HOSPADM

## 2022-12-19 RX ORDER — SODIUM CHLORIDE 9 MG/ML
INJECTION, SOLUTION INTRAVENOUS PRN
Status: DISCONTINUED | OUTPATIENT
Start: 2022-12-19 | End: 2022-12-21 | Stop reason: HOSPADM

## 2022-12-19 RX ORDER — SODIUM CHLORIDE 0.9 % (FLUSH) 0.9 %
5-40 SYRINGE (ML) INJECTION EVERY 12 HOURS SCHEDULED
Status: DISCONTINUED | OUTPATIENT
Start: 2022-12-19 | End: 2022-12-19 | Stop reason: HOSPADM

## 2022-12-19 RX ORDER — DEXTROSE MONOHYDRATE 100 MG/ML
INJECTION, SOLUTION INTRAVENOUS CONTINUOUS PRN
Status: DISCONTINUED | OUTPATIENT
Start: 2022-12-19 | End: 2022-12-21 | Stop reason: HOSPADM

## 2022-12-19 RX ORDER — SODIUM CHLORIDE, SODIUM LACTATE, POTASSIUM CHLORIDE, CALCIUM CHLORIDE 600; 310; 30; 20 MG/100ML; MG/100ML; MG/100ML; MG/100ML
INJECTION, SOLUTION INTRAVENOUS CONTINUOUS
Status: DISCONTINUED | OUTPATIENT
Start: 2022-12-19 | End: 2022-12-19

## 2022-12-19 RX ORDER — SODIUM CHLORIDE 9 MG/ML
INJECTION, SOLUTION INTRAVENOUS PRN
Status: DISCONTINUED | OUTPATIENT
Start: 2022-12-19 | End: 2022-12-19

## 2022-12-19 RX ORDER — IODIXANOL 320 MG/ML
INJECTION, SOLUTION INTRAVASCULAR PRN
Status: DISCONTINUED | OUTPATIENT
Start: 2022-12-19 | End: 2022-12-19 | Stop reason: ALTCHOICE

## 2022-12-19 RX ADMIN — SODIUM CHLORIDE, POTASSIUM CHLORIDE, SODIUM LACTATE AND CALCIUM CHLORIDE: 600; 310; 30; 20 INJECTION, SOLUTION INTRAVENOUS at 12:20

## 2022-12-19 RX ADMIN — METHOCARBAMOL TABLETS 750 MG: 750 TABLET, COATED ORAL at 20:46

## 2022-12-19 RX ADMIN — Medication 0.2 MG/KG/HR: at 19:45

## 2022-12-19 RX ADMIN — FENTANYL CITRATE 50 MCG: 50 INJECTION, SOLUTION INTRAMUSCULAR; INTRAVENOUS at 12:15

## 2022-12-19 RX ADMIN — FENTANYL CITRATE 25 MCG: 50 INJECTION, SOLUTION INTRAMUSCULAR; INTRAVENOUS at 18:38

## 2022-12-19 RX ADMIN — ATORVASTATIN CALCIUM 80 MG: 80 TABLET, FILM COATED ORAL at 20:46

## 2022-12-19 RX ADMIN — DEXAMETHASONE SODIUM PHOSPHATE 4 MG: 10 INJECTION INTRAMUSCULAR; INTRAVENOUS at 12:20

## 2022-12-19 RX ADMIN — FENTANYL CITRATE 50 MCG: 50 INJECTION, SOLUTION INTRAMUSCULAR; INTRAVENOUS at 12:09

## 2022-12-19 RX ADMIN — SODIUM CHLORIDE: 9 INJECTION, SOLUTION INTRAVENOUS at 13:44

## 2022-12-19 RX ADMIN — Medication 50 MCG: at 13:27

## 2022-12-19 RX ADMIN — Medication 5 MG: at 14:13

## 2022-12-19 RX ADMIN — ACETAMINOPHEN 1000 MG: 500 TABLET, FILM COATED ORAL at 21:12

## 2022-12-19 RX ADMIN — Medication 10 MG: at 14:51

## 2022-12-19 RX ADMIN — OXYCODONE 5 MG: 5 TABLET ORAL at 21:12

## 2022-12-19 RX ADMIN — Medication 81 MG: at 20:46

## 2022-12-19 RX ADMIN — SODIUM CHLORIDE, POTASSIUM CHLORIDE, SODIUM LACTATE AND CALCIUM CHLORIDE: 600; 310; 30; 20 INJECTION, SOLUTION INTRAVENOUS at 14:21

## 2022-12-19 RX ADMIN — HEPARIN SODIUM 10000 UNITS: 1000 INJECTION INTRAVENOUS; SUBCUTANEOUS at 15:40

## 2022-12-19 RX ADMIN — LIDOCAINE HYDROCHLORIDE 50 MG: 10 INJECTION, SOLUTION EPIDURAL; INFILTRATION; INTRACAUDAL; PERINEURAL at 12:15

## 2022-12-19 RX ADMIN — FENTANYL CITRATE 50 MCG: 50 INJECTION, SOLUTION INTRAMUSCULAR; INTRAVENOUS at 16:10

## 2022-12-19 RX ADMIN — DOCUSATE SODIUM 50 MG AND SENNOSIDES 8.6 MG 1 TABLET: 8.6; 5 TABLET, FILM COATED ORAL at 20:46

## 2022-12-19 RX ADMIN — Medication 50 MCG: at 13:57

## 2022-12-19 RX ADMIN — FENTANYL CITRATE 25 MCG: 50 INJECTION, SOLUTION INTRAMUSCULAR; INTRAVENOUS at 18:46

## 2022-12-19 RX ADMIN — GABAPENTIN 300 MG: 300 CAPSULE ORAL at 20:46

## 2022-12-19 RX ADMIN — Medication 1000 MG: at 12:20

## 2022-12-19 RX ADMIN — SODIUM CHLORIDE, PRESERVATIVE FREE 10 ML: 5 INJECTION INTRAVENOUS at 20:46

## 2022-12-19 RX ADMIN — ONDANSETRON 4 MG: 2 INJECTION INTRAMUSCULAR; INTRAVENOUS at 19:55

## 2022-12-19 RX ADMIN — Medication 10 MG: at 13:15

## 2022-12-19 RX ADMIN — Medication 5 MG: at 14:07

## 2022-12-19 RX ADMIN — HEPARIN SODIUM AND DEXTROSE 500 UNITS/HR: 10000; 5 INJECTION INTRAVENOUS at 19:45

## 2022-12-19 RX ADMIN — SODIUM CHLORIDE: 9 INJECTION, SOLUTION INTRAVENOUS at 09:42

## 2022-12-19 RX ADMIN — FENTANYL CITRATE 50 MCG: 50 INJECTION, SOLUTION INTRAMUSCULAR; INTRAVENOUS at 11:30

## 2022-12-19 RX ADMIN — DEXTROSE MONOHYDRATE 25 G: 25 INJECTION, SOLUTION INTRAVENOUS at 10:55

## 2022-12-19 RX ADMIN — Medication 50 MCG: at 13:50

## 2022-12-19 RX ADMIN — Medication 30 MG: at 12:18

## 2022-12-19 RX ADMIN — KETOROLAC TROMETHAMINE 15 MG: 15 INJECTION, SOLUTION INTRAMUSCULAR; INTRAVENOUS at 20:46

## 2022-12-19 RX ADMIN — MIDAZOLAM 2 MG: 1 INJECTION INTRAMUSCULAR; INTRAVENOUS at 12:07

## 2022-12-19 RX ADMIN — PROPOFOL 200 MG: 10 INJECTION, EMULSION INTRAVENOUS at 12:15

## 2022-12-19 RX ADMIN — ONDANSETRON 4 MG: 2 INJECTION INTRAMUSCULAR; INTRAVENOUS at 17:48

## 2022-12-19 RX ADMIN — ROCURONIUM BROMIDE 50 MG: 10 INJECTION, SOLUTION INTRAVENOUS at 12:15

## 2022-12-19 RX ADMIN — HEPARIN SODIUM 2000 UNITS: 1000 INJECTION INTRAVENOUS; SUBCUTANEOUS at 16:36

## 2022-12-19 RX ADMIN — SODIUM CHLORIDE, POTASSIUM CHLORIDE, SODIUM LACTATE AND CALCIUM CHLORIDE: 600; 310; 30; 20 INJECTION, SOLUTION INTRAVENOUS at 17:46

## 2022-12-19 ASSESSMENT — PAIN DESCRIPTION - LOCATION
LOCATION: LEG
LOCATION: LEG

## 2022-12-19 ASSESSMENT — PAIN SCALES - GENERAL
PAINLEVEL_OUTOF10: 10
PAINLEVEL_OUTOF10: 10

## 2022-12-19 ASSESSMENT — PAIN DESCRIPTION - DESCRIPTORS
DESCRIPTORS: ACHING;BURNING
DESCRIPTORS: ACHING;BURNING

## 2022-12-19 ASSESSMENT — PAIN - FUNCTIONAL ASSESSMENT: PAIN_FUNCTIONAL_ASSESSMENT: PREVENTS OR INTERFERES SOME ACTIVE ACTIVITIES AND ADLS

## 2022-12-19 ASSESSMENT — PAIN DESCRIPTION - FREQUENCY: FREQUENCY: CONTINUOUS

## 2022-12-19 ASSESSMENT — PAIN DESCRIPTION - ORIENTATION
ORIENTATION: LEFT
ORIENTATION: LEFT

## 2022-12-19 ASSESSMENT — PAIN DESCRIPTION - ONSET: ONSET: ON-GOING

## 2022-12-19 ASSESSMENT — PAIN DESCRIPTION - PAIN TYPE: TYPE: CHRONIC PAIN

## 2022-12-19 NOTE — PROGRESS NOTES
Dr Homer Rowley notified of current glucose of 68 and coverage given / and that patient had taken 80 units long insulin this am.  Anesthesia also asked for pain mediation / ongoing left leg pains / awaiting orders

## 2022-12-19 NOTE — PROGRESS NOTES
Patient admitted, consent signed and questions answered. Patient ready for procedure. Call light to reach with side rails up 2 of 2. Bilat groin hairs clipped with writer and Sindhu present. wife at bedside with patient. History and physical needing update.
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
## 2022-12-20 LAB
ABSOLUTE EOS #: <0.03 K/UL (ref 0–0.44)
ABSOLUTE IMMATURE GRANULOCYTE: 0.06 K/UL (ref 0–0.3)
ABSOLUTE LYMPH #: 2.43 K/UL (ref 1.1–3.7)
ABSOLUTE MONO #: 1.08 K/UL (ref 0.1–1.2)
ANION GAP SERPL CALCULATED.3IONS-SCNC: 10 MMOL/L (ref 9–17)
BASOPHILS # BLD: 0 % (ref 0–2)
BASOPHILS ABSOLUTE: 0.03 K/UL (ref 0–0.2)
BUN BLDV-MCNC: 16 MG/DL (ref 6–20)
CALCIUM SERPL-MCNC: 8 MG/DL (ref 8.6–10.4)
CHLORIDE BLD-SCNC: 98 MMOL/L (ref 98–107)
CO2: 24 MMOL/L (ref 20–31)
CREAT SERPL-MCNC: 0.61 MG/DL (ref 0.7–1.2)
EOSINOPHILS RELATIVE PERCENT: 0 % (ref 1–4)
GFR SERPL CREATININE-BSD FRML MDRD: >60 ML/MIN/1.73M2
GLUCOSE BLD-MCNC: 148 MG/DL (ref 75–110)
GLUCOSE BLD-MCNC: 176 MG/DL (ref 75–110)
GLUCOSE BLD-MCNC: 183 MG/DL (ref 75–110)
GLUCOSE BLD-MCNC: 198 MG/DL (ref 70–99)
HCT VFR BLD CALC: 32.1 % (ref 40.7–50.3)
HEMOGLOBIN: 9.8 G/DL (ref 13–17)
IMMATURE GRANULOCYTES: 0 %
LYMPHOCYTES # BLD: 17 % (ref 24–43)
MCH RBC QN AUTO: 31.2 PG (ref 25.2–33.5)
MCHC RBC AUTO-ENTMCNC: 30.5 G/DL (ref 28.4–34.8)
MCV RBC AUTO: 102.2 FL (ref 82.6–102.9)
MONOCYTES # BLD: 8 % (ref 3–12)
NRBC AUTOMATED: 0 PER 100 WBC
PARTIAL THROMBOPLASTIN TIME: 26.1 SEC (ref 20.5–30.5)
PDW BLD-RTO: 12.4 % (ref 11.8–14.4)
PLATELET # BLD: 298 K/UL (ref 138–453)
PMV BLD AUTO: 8.8 FL (ref 8.1–13.5)
POTASSIUM SERPL-SCNC: 3.9 MMOL/L (ref 3.7–5.3)
RBC # BLD: 3.14 M/UL (ref 4.21–5.77)
SEG NEUTROPHILS: 74 % (ref 36–65)
SEGMENTED NEUTROPHILS ABSOLUTE COUNT: 10.41 K/UL (ref 1.5–8.1)
SODIUM BLD-SCNC: 132 MMOL/L (ref 135–144)
WBC # BLD: 14 K/UL (ref 3.5–11.3)

## 2022-12-20 PROCEDURE — 6370000000 HC RX 637 (ALT 250 FOR IP): Performed by: STUDENT IN AN ORGANIZED HEALTH CARE EDUCATION/TRAINING PROGRAM

## 2022-12-20 PROCEDURE — 85730 THROMBOPLASTIN TIME PARTIAL: CPT

## 2022-12-20 PROCEDURE — 6360000002 HC RX W HCPCS: Performed by: STUDENT IN AN ORGANIZED HEALTH CARE EDUCATION/TRAINING PROGRAM

## 2022-12-20 PROCEDURE — 2580000003 HC RX 258: Performed by: STUDENT IN AN ORGANIZED HEALTH CARE EDUCATION/TRAINING PROGRAM

## 2022-12-20 PROCEDURE — 36415 COLL VENOUS BLD VENIPUNCTURE: CPT

## 2022-12-20 PROCEDURE — 82947 ASSAY GLUCOSE BLOOD QUANT: CPT

## 2022-12-20 PROCEDURE — 2000000000 HC ICU R&B

## 2022-12-20 PROCEDURE — 85025 COMPLETE CBC W/AUTO DIFF WBC: CPT

## 2022-12-20 PROCEDURE — 80048 BASIC METABOLIC PNL TOTAL CA: CPT

## 2022-12-20 RX ORDER — OXYCODONE HYDROCHLORIDE 5 MG/1
10 TABLET ORAL EVERY 6 HOURS PRN
Status: DISCONTINUED | OUTPATIENT
Start: 2022-12-20 | End: 2022-12-21 | Stop reason: HOSPADM

## 2022-12-20 RX ORDER — METHADONE HYDROCHLORIDE 5 MG/5ML
105 SOLUTION ORAL DAILY
Status: DISCONTINUED | OUTPATIENT
Start: 2022-12-20 | End: 2022-12-21 | Stop reason: HOSPADM

## 2022-12-20 RX ORDER — OXYCODONE HYDROCHLORIDE 5 MG/1
5 TABLET ORAL ONCE
Status: COMPLETED | OUTPATIENT
Start: 2022-12-20 | End: 2022-12-20

## 2022-12-20 RX ADMIN — METHOCARBAMOL TABLETS 750 MG: 750 TABLET, COATED ORAL at 07:42

## 2022-12-20 RX ADMIN — GABAPENTIN 300 MG: 300 CAPSULE ORAL at 04:30

## 2022-12-20 RX ADMIN — OXYCODONE 5 MG: 5 TABLET ORAL at 07:40

## 2022-12-20 RX ADMIN — METHOCARBAMOL TABLETS 750 MG: 750 TABLET, COATED ORAL at 01:18

## 2022-12-20 RX ADMIN — ACETAMINOPHEN 1000 MG: 500 TABLET, FILM COATED ORAL at 14:19

## 2022-12-20 RX ADMIN — METHADONE HYDROCHLORIDE 105 MG: 5 SOLUTION ORAL at 11:45

## 2022-12-20 RX ADMIN — KETOROLAC TROMETHAMINE 15 MG: 15 INJECTION, SOLUTION INTRAMUSCULAR; INTRAVENOUS at 14:17

## 2022-12-20 RX ADMIN — OXYCODONE 5 MG: 5 TABLET ORAL at 06:02

## 2022-12-20 RX ADMIN — KETOROLAC TROMETHAMINE 15 MG: 15 INJECTION, SOLUTION INTRAMUSCULAR; INTRAVENOUS at 01:18

## 2022-12-20 RX ADMIN — KETOROLAC TROMETHAMINE 15 MG: 15 INJECTION, SOLUTION INTRAMUSCULAR; INTRAVENOUS at 20:32

## 2022-12-20 RX ADMIN — GABAPENTIN 300 MG: 300 CAPSULE ORAL at 20:32

## 2022-12-20 RX ADMIN — LISINOPRIL 20 MG: 20 TABLET ORAL at 07:42

## 2022-12-20 RX ADMIN — OXYCODONE 10 MG: 5 TABLET ORAL at 14:19

## 2022-12-20 RX ADMIN — ATORVASTATIN CALCIUM 80 MG: 80 TABLET, FILM COATED ORAL at 20:32

## 2022-12-20 RX ADMIN — GABAPENTIN 300 MG: 300 CAPSULE ORAL at 11:46

## 2022-12-20 RX ADMIN — ACETAMINOPHEN 1000 MG: 500 TABLET, FILM COATED ORAL at 20:32

## 2022-12-20 RX ADMIN — DOCUSATE SODIUM 50 MG AND SENNOSIDES 8.6 MG 1 TABLET: 8.6; 5 TABLET, FILM COATED ORAL at 20:32

## 2022-12-20 RX ADMIN — Medication 81 MG: at 07:42

## 2022-12-20 RX ADMIN — OXYCODONE 5 MG: 5 TABLET ORAL at 01:18

## 2022-12-20 RX ADMIN — OXYCODONE 5 MG: 5 TABLET ORAL at 19:09

## 2022-12-20 RX ADMIN — ACETAMINOPHEN 1000 MG: 500 TABLET, FILM COATED ORAL at 06:03

## 2022-12-20 RX ADMIN — METHOCARBAMOL TABLETS 750 MG: 750 TABLET, COATED ORAL at 14:17

## 2022-12-20 RX ADMIN — OXYCODONE 5 MG: 5 TABLET ORAL at 10:37

## 2022-12-20 RX ADMIN — METHOCARBAMOL TABLETS 750 MG: 750 TABLET, COATED ORAL at 20:32

## 2022-12-20 RX ADMIN — OXYCODONE 10 MG: 5 TABLET ORAL at 20:32

## 2022-12-20 RX ADMIN — KETOROLAC TROMETHAMINE 15 MG: 15 INJECTION, SOLUTION INTRAMUSCULAR; INTRAVENOUS at 07:41

## 2022-12-20 RX ADMIN — SODIUM CHLORIDE, PRESERVATIVE FREE 10 ML: 5 INJECTION INTRAVENOUS at 07:44

## 2022-12-20 ASSESSMENT — PAIN SCALES - WONG BAKER
WONGBAKER_NUMERICALRESPONSE: 10
WONGBAKER_NUMERICALRESPONSE: 10
WONGBAKER_NUMERICALRESPONSE: 8
WONGBAKER_NUMERICALRESPONSE: 10

## 2022-12-20 ASSESSMENT — PAIN DESCRIPTION - FREQUENCY
FREQUENCY: CONTINUOUS
FREQUENCY: CONTINUOUS

## 2022-12-20 ASSESSMENT — PAIN DESCRIPTION - ORIENTATION
ORIENTATION: LEFT

## 2022-12-20 ASSESSMENT — PAIN SCALES - GENERAL
PAINLEVEL_OUTOF10: 10
PAINLEVEL_OUTOF10: 10
PAINLEVEL_OUTOF10: 9
PAINLEVEL_OUTOF10: 10
PAINLEVEL_OUTOF10: 10
PAINLEVEL_OUTOF10: 6
PAINLEVEL_OUTOF10: 8

## 2022-12-20 ASSESSMENT — PAIN DESCRIPTION - DESCRIPTORS
DESCRIPTORS: ACHING;BURNING;DISCOMFORT
DESCRIPTORS: ACHING;DISCOMFORT
DESCRIPTORS: ACHING;DISCOMFORT;BURNING

## 2022-12-20 ASSESSMENT — PAIN DESCRIPTION - LOCATION
LOCATION: LEG

## 2022-12-20 ASSESSMENT — PAIN DESCRIPTION - ONSET
ONSET: ON-GOING
ONSET: ON-GOING

## 2022-12-20 ASSESSMENT — PAIN - FUNCTIONAL ASSESSMENT
PAIN_FUNCTIONAL_ASSESSMENT: PREVENTS OR INTERFERES WITH ALL ACTIVE AND SOME PASSIVE ACTIVITIES
PAIN_FUNCTIONAL_ASSESSMENT: PREVENTS OR INTERFERES WITH MANY ACTIVE NOT PASSIVE ACTIVITIES
PAIN_FUNCTIONAL_ASSESSMENT: PREVENTS OR INTERFERES WITH MANY ACTIVE NOT PASSIVE ACTIVITIES

## 2022-12-20 ASSESSMENT — PAIN DESCRIPTION - PAIN TYPE
TYPE: CHRONIC PAIN
TYPE: CHRONIC PAIN

## 2022-12-20 NOTE — PROGRESS NOTES
Division of Vascular Surgery         Progress Note      Name: Michelet French  MRN: [id number]         Subjective:     Patient seen and examined at bedside. No acute events overnight. Hemodynamically stable, and afebrile. Patient is s/p left femoral to popliteal bypass with RGSV. Patient reports doing okay this morning, but states his pain medication wears off too quickly sometimes. Patient states that his foot and toes are starting to feel better. He denies any chest pain, shortness of breath, nausea, vomiting, fever or chills. Physical Exam:     Vitals:  BP (!) 175/95   Pulse 73   Temp 98.9 °F (37.2 °C)   Resp 16   Ht 6' (1.829 m)   Wt 219 lb 9.3 oz (99.6 kg)   SpO2 95%   BMI 29.78 kg/m²     Physical Exam  Constitutional:       General: He is not in acute distress. Appearance: Normal appearance. HENT:      Head: Normocephalic and atraumatic. Nose: Nose normal.      Mouth/Throat:      Mouth: Mucous membranes are moist.   Eyes:      Extraocular Movements: Extraocular movements intact. Cardiovascular:      Rate and Rhythm: Normal rate and regular rhythm. Comments: Doppler signals R DP/PT   Doppler signals L DP   Pulmonary:      Effort: Pulmonary effort is normal. No respiratory distress. Abdominal:      General: There is no distension. Palpations: Abdomen is soft. Musculoskeletal:         General: Swelling present. Cervical back: Neck supple. Comments: Mild bilateral lower extremity edema    Skin:     General: Skin is warm and dry. Comments: Surgical dressings are clean, dry, and intact    Neurological:      General: No focal deficit present. Mental Status: He is alert.        Imaging/Labs:     12/20/22 labs reviewed       Assessment/Plan:     Continue  frequent vascular exams  Continue heparin drip at 500 units/hr   Continue pain regimen with tylenol, toradol, gabapentin, robaxin, roxicodone, and ketamine drip   Regular diet   Encourage incentive spirometer use     Electronically signed by Yamile Mak DO on 12/20/22 at 5:28 AM EST      [address] [place] [address],[address]: [phone]  C: [phone]

## 2022-12-20 NOTE — ANESTHESIA POSTPROCEDURE EVALUATION
Department of Anesthesiology  Postprocedure Note    Patient: Daniel Gaffney  MRN: [id number]  YOB: 1970  Date of evaluation: 12/19/2022      Procedure Summary     Date: 12/19/22 Room / Location: [place] [address] / [facility]    Anesthesia Start: 1202 Anesthesia Stop:     Procedure: LEFT LOWER EXTREMITY ANGIOGRAM, POPLITEAL ENDARTERECTOMY WITH [address] 0.8X8CM PATCH ANGIOPLASTY, AT/TPT ENDARTERECTOMY, FEMORAL POPLITEAL BYPASS WITH REVERSE SAPHENOUS VEIN GRAFT, SHARP EXCISIONAL DEBRIDEMENT SKIN 30SQCM (Leg Lower) Diagnosis:       PAD (peripheral artery disease) ([address]      (PAD)    Surgeons: Frankey Olea, MD Responsible Provider: Nikki Campos MD    Anesthesia Type: general ASA Status: 3          Anesthesia Type: No value filed.     Silvano Phase I:      Silvano Phase II:        Anesthesia Post Evaluation    Patient location during evaluation: ICU  Patient participation: complete - patient participated  Level of consciousness: awake  Airway patency: patent  Nausea & Vomiting: no nausea and no vomiting  Complications: no  Cardiovascular status: blood pressure returned to baseline  Respiratory status: acceptable  Hydration status: euvolemic  Comments: BP (!) 136/92   Pulse 79   Temp 98.2 °F (36.8 °C) (Oral)   Resp 13   Ht 6' (1.829 m)   Wt 208 lb (94.3 kg)   SpO2 97%   BMI 28.21 kg/m²

## 2022-12-20 NOTE — OP NOTE
Operative Note      Patient: Sacha Jha  YOB: 1970  MRN: [id number]    Date of Procedure: 12/19/2022    Pre-Op Diagnosis: Peripheral arterial disease, ischemic rest pain, non-healing left foot wounds    Post-Op Diagnosis: Same       Procedure:  1) US guided vascular access of right common femoral artery  2) Selective catheterization of left common femoral artery  3) Left lower extremity angiogram  4) Left above knee popliteal artery endarterectomy with patch angioplasty  5) Left below knee popliteal artery, proximal anterior tibial artery and tibial peroneal trunk endarterectomy  6) Left above knee popliteal to below knee popliteal bypass with reversed great saphenous vein   7) Sharp excisional debridement of necrotic skin 50 square centimeters    Surgeon(s): Eda Segovia MD    Assistant: Dr. Jose Sheth    Anesthesia: General    Estimated Blood Loss (mL): 302 ml    Complications: None    Specimens: none    Implants:  Implant Name Type Inv. Item Serial No.  Lot No. LRB No. Used Action   GRAFT BIO TISS W0.8XL8CM DECELLULARIZED BOV PERICARD [facility] - [id number]  GRAFT BIO TISS W0.8XL8CM DECELLULARIZED BOV PERICARD [facility] [id number] [facility]- [id number] Left 1 Implanted       Drains: none    Findings: Extensive fibrous and atherosclerotic plaque in the popliteal artery along with proximal tibial vessels. Initial Angiogram revealed occlusion of left popliteal artery with severe tibial occlusive disease. After revascularization there was improved circulation with excellent multiphasic doppler signals in the foot with palpable graft pulse. Plan:  Non-therapeutic heparin drip at 500 units/hr, monitor in CVICU     Detailed Description of Procedure:     Mr. Arnel Brock was brought to the operating room for revascularization of his left lower extremity for ischemic rest pain and non-healing wounds.   After appropriate anesthesia delivered, ultrasound was used to felipa out his great saphenous veins. On the left the vein was of good size up to the mid lower thigh, but was a bit small near the knee. The right great saphenous vein was of good size, both were marked out. Timeout performed and agreed upon, antibiotics given during this period. I began performing sharp excisional debridement of the necrotic skin along his left foot using scissors, total debridement of left foot measured 50 square centimeters. Next bilateral lower extremities and lower abdomen prepped and draped in standard sterile fashion. I evaluated the right common femoral artery under ultrasound, it was patent and compressible. Micropuncture needle used to access the common femoral artery, permanent ultrasound images saved. A short 5-Barbadian sheath inserted and flushed. Using a glide wire and RBI catheter I selectively second order catheterized the left common femoral artery and performed left lower extremity angiogram.  This revealed patent left common femoral and profunda arteries. The superficial femoral artery was patent and appears to taper near Selvin's canal.  There are large geniculates that are seen filling the distal popliteal artery and tributaries to the anterior tibial and peroneal arteries. The posterior tibial artery is chronically occluded. Given these findings decision made to explore the below knee popliteal artery and see if it would be an appropriate distal bypass target. The sheath was removed and manual pressure held for hemostasis. A left medial calf incision created and extended down with cautery through the subcutaneous tissue and soleus fascia. The gastrocnemius muscle was retracted down posteriorly revealing the popliteal fossa. The popliteal artery and vein identified and dissected free. The anterior tibial and tibial peroneal trunk were also dissected free and vessel loops placed to assist with control.   The below knee popliteal artery was soft but did not fully compress. Next I made a low medial thigh incision and then using cautery  I went through the subcutaneous tissue down to the fascia, this was opened and the sartorius muscle retracted down exposing the the above knee popliteal neurovascular bundle. The above knee popliteal artery was dissected out for several centimeters up to essentially Selvin's canal.   Vessel loops placed to assist in control. The artery was soft but thickened. The left great saphenous vein was then dissected through skip incisions made along the left medial thigh. Branches were dissected free and ligated with silk ties. Once a long enough piece of vein was dissected free, it was then ligated at the junction at the common femoral vein with hemoclips and transected, distal end ligated with hemoclips as well and transected. The vein was tested and there were no leaks noted, it dilated up very well. The vein was then stored in warm heparinized saline solution. An anatomical tunnel created initially bluntly with my fingers from the above knee to below knee popliteal space, then passing a clamp and umbilical tape to felipa the area. Patient was given 03559 units of heparin and re-bolus 2000 units at the 45 minute felipa. The above knee popliteal artery was controlled with proximal and distal clamps and a longitudinal arteriotomy created. There was extensive fibrous plaque noted. Further dissection proximally was performed to get full control of the vessel. Arteriotomy extended with villa scissors. Endarterectomy performed using freer elevator, plaque extended up to Selvin's canal and I was able to establish excellent pulsatile inflow. The plaque which was more fibrous in the distal popliteal vessel and extended all the way down and using tonsil clamp I was able to remove a large piece of the plaque and restore some back bleeding.   A bovine pericardium patch was then used to repair the artery using 5-0 proline sutures, prior to completion usual flushing maneuvers performed and the anastomosis was hemostatic. Next the below knee popliteal artery was controlled and opened with an 11 blade, arteriotomy extended to the anterior tibial and tibial peroneal trunk junction. There was no back bleeding from the popliteal or anterior tibial artery, just the tibial peroneal trunk. There was fibrous plaque and this was endarterectomized from the below knee popliteal artery and the orifice of the anterior tibial and tibial peroneal trunk. I was able to restore back bleeding from the anterior tibial artery but no flow noted from the popliteal artery, likely from chronic occlusion at the P2 segment. The left great saphenous vein that was removed earlier was then tunneled anatomically in reverse fashion. The reversed end was then spatulated and any valves lysed . The above knee popliteal artery was controled with proximal and distal clamps, arteriotomy created along the patch and an end to side anastomosis performed with 6-0 proline. The anastomosis was flushed through the bypass graft and was hemostatic. The distal end was then spatulated and trimmed. An end to side anastomosis performed to the below knee popliteal artery on to the tibial peroneal trunk and anterior tibial artery junction using 6-0 proline, prior to completion usual flushing maneuvers performed and the anastomosis was hemostatic. There was an excellent graft pulse with multiphasic signals distal to the bypass graft. The foot was starting to perfuse better but there was not a great pulse. Completion angiogram performed using a micropuncture needle accessing the above knee popliteal artery directly. This revealed patent popliteal bypass with flow going into the tibial peroneal trunk and anterior tibial artery with some segmental spasm versus stenosis. There was flow going into the foot. There was no kink or twist int he bypass graft.     Wound beds were irrigated and dried. Each incision closed in multiple layers of interrupted vicryl and running monocryl. Steristrip and sterile dressing applied, non-adhesive dressing placed over left foot wounds followed by light compressive wrap. Foot was warm and well perfused upon completion, excellent doppler signals over dorsalis pedis and palpable graft pulse behind the knee. He was then reversed from anesthesia and brought to CVICU for recovery.      Electronically signed by Jp Saxena MD on 12/19/2022 at 8:00 PM

## 2022-12-20 NOTE — CARE COORDINATION
Case Management Assessment  Initial Evaluation    Date/Time of Evaluation: 12/20/2022 10:48 AM  Assessment Completed by: Caron Padilla    If patient is discharged prior to next notation, then this note serves as note for discharge by case management. Patient Name: Eneida Gregg                   YOB: 1970  Diagnosis: PAD (peripheral artery disease) ([facility] [I73.9]  Limb ischemia [I99.8]                   Date / Time: 12/19/2022  8:54 AM    Patient Admission Status: Inpatient   Readmission Risk (Low < 19, Mod (19-27), High > 27): Readmission Risk Score: 16.5    Current PCP: Rosa Huizar MD  PCP verified by CM? (P) Yes Rosa Huizar MD)    Chart Reviewed: Yes      History Provided by: [facility]) Patient  Patient Orientation: (P) Alert and Oriented, Person, Place, Situation    Patient Cognition: (P) Alert    Hospitalization in the last 30 days (Readmission):  No    If yes, Readmission Assessment in CM Navigator will be completed.     Advance Directives:      Code Status: Full Code   Patient's Primary Decision Maker is: (P) Patient Declined (Legal Next of Kin Remains as Decision Maker)      Discharge Planning:    Patient lives with: (P) Spouse/Significant Other Type of Home:    Primary Care Giver: (P) Self  Patient Support Systems include: (P) Spouse/Significant Other, Parent   Current Financial resources:    Current community resources:    Current services prior to admission: (P) None            Current DME:              Type of Home Care services:  (P) None    ADLS  Prior functional level: (P) Independent in ADLs/IADLs  Current functional level: (P) Independent in ADLs/IADLs    PT AM-PAC:   /24  OT AM-PAC:   /24    Family can provide assistance at DC: (P) Yes  Would you like Case Management to discuss the discharge plan with any other family members/significant others, and if so, who? (P) Yes, wife  Plans to Return to Present Housing: (P) Yes  Other Identified Issues/Barriers to RETURNING to current housing: none  Potential Assistance needed at discharge: (P) N/A            Potential DME: none   Patient expects to discharge to: (P) [address] [facility] for transportation at discharge: (P) Family (Wife will transport)    Financial    Payor: C/ [address] / Plan: C/ [address] / Product Type: *No Product type* /     Does insurance require precert for SNF: N/A    Potential assistance Purchasing Medications: (P) No  Meds-to-Beds request: Yes      [id number], [address] [facility] [address] [facility]  [address] [facility] [address]  Phone: [phone] Fax: [phone]    [facility] [place] [id number] [facility] [address] [phone] - F [phone]  [address]  [address] [address]  Phone: [phone] Fax: [phone]      Notes:    Factors facilitating achievement of predicted outcomes: Family support, Motivated, Cooperative, Pleasant, and Sense of humor    Barriers to discharge: Pain, Stairs at home, and Wound Care    Additional Case Management Notes: Transitional planning: Patient plans to return home with wife independently. Has ride. Wife will transport. Has bee seen at [facility] and plans to continue there after discharge. Address, emergency contacts, PCP and insurance confirmed with patient. The Plan for Transition of Care is related to the following treatment goals of PAD (peripheral artery disease) ([facility] [I73.9]  Limb ischemia [D47.1]    IF APPLICABLE: The Patient and/or patient representative Lucien An and his family were provided with a choice of provider and agrees with the discharge plan. Freedom of choice list with basic dialogue that supports the patient's individualized plan of care/goals and shares the quality data associated with the providers was provided to: (P) Patient   Patient Representative Name:       The Patient and/or Patient Representative Agree with the Discharge Plan? (P) Yes    [facility]  Case Management Department  Ph: [phone] Fax: [phone]

## 2022-12-21 VITALS
OXYGEN SATURATION: 97 % | TEMPERATURE: 97.9 F | HEART RATE: 71 BPM | WEIGHT: 219.58 LBS | SYSTOLIC BLOOD PRESSURE: 136 MMHG | DIASTOLIC BLOOD PRESSURE: 85 MMHG | BODY MASS INDEX: 29.74 KG/M2 | HEIGHT: 72 IN | RESPIRATION RATE: 15 BRPM

## 2022-12-21 LAB
ABSOLUTE EOS #: 0.13 K/UL (ref 0–0.44)
ABSOLUTE IMMATURE GRANULOCYTE: 0.04 K/UL (ref 0–0.3)
ABSOLUTE LYMPH #: 2.09 K/UL (ref 1.1–3.7)
ABSOLUTE MONO #: 0.78 K/UL (ref 0.1–1.2)
ANION GAP SERPL CALCULATED.3IONS-SCNC: 7 MMOL/L (ref 9–17)
BASOPHILS # BLD: 0 % (ref 0–2)
BASOPHILS ABSOLUTE: 0.03 K/UL (ref 0–0.2)
BUN BLDV-MCNC: 15 MG/DL (ref 6–20)
CALCIUM SERPL-MCNC: 8.2 MG/DL (ref 8.6–10.4)
CHLORIDE BLD-SCNC: 104 MMOL/L (ref 98–107)
CO2: 27 MMOL/L (ref 20–31)
CREAT SERPL-MCNC: 0.52 MG/DL (ref 0.7–1.2)
EOSINOPHILS RELATIVE PERCENT: 1 % (ref 1–4)
GFR SERPL CREATININE-BSD FRML MDRD: >60 ML/MIN/1.73M2
GLUCOSE BLD-MCNC: 136 MG/DL (ref 70–99)
GLUCOSE BLD-MCNC: 174 MG/DL (ref 75–110)
GLUCOSE BLD-MCNC: 210 MG/DL (ref 75–110)
HCT VFR BLD CALC: 31.1 % (ref 40.7–50.3)
HEMOGLOBIN: 9.8 G/DL (ref 13–17)
IMMATURE GRANULOCYTES: 0 %
LYMPHOCYTES # BLD: 23 % (ref 24–43)
MCH RBC QN AUTO: 31.1 PG (ref 25.2–33.5)
MCHC RBC AUTO-ENTMCNC: 31.5 G/DL (ref 28.4–34.8)
MCV RBC AUTO: 98.7 FL (ref 82.6–102.9)
MONOCYTES # BLD: 9 % (ref 3–12)
NRBC AUTOMATED: 0 PER 100 WBC
PARTIAL THROMBOPLASTIN TIME: 29.6 SEC (ref 20.5–30.5)
PDW BLD-RTO: 12.3 % (ref 11.8–14.4)
PLATELET # BLD: 282 K/UL (ref 138–453)
PMV BLD AUTO: 8.7 FL (ref 8.1–13.5)
POTASSIUM SERPL-SCNC: 4.1 MMOL/L (ref 3.7–5.3)
RBC # BLD: 3.15 M/UL (ref 4.21–5.77)
SEG NEUTROPHILS: 66 % (ref 36–65)
SEGMENTED NEUTROPHILS ABSOLUTE COUNT: 5.91 K/UL (ref 1.5–8.1)
SODIUM BLD-SCNC: 138 MMOL/L (ref 135–144)
WBC # BLD: 9 K/UL (ref 3.5–11.3)

## 2022-12-21 PROCEDURE — 97535 SELF CARE MNGMENT TRAINING: CPT

## 2022-12-21 PROCEDURE — 6360000002 HC RX W HCPCS: Performed by: STUDENT IN AN ORGANIZED HEALTH CARE EDUCATION/TRAINING PROGRAM

## 2022-12-21 PROCEDURE — 97162 PT EVAL MOD COMPLEX 30 MIN: CPT

## 2022-12-21 PROCEDURE — 6370000000 HC RX 637 (ALT 250 FOR IP): Performed by: STUDENT IN AN ORGANIZED HEALTH CARE EDUCATION/TRAINING PROGRAM

## 2022-12-21 PROCEDURE — 82947 ASSAY GLUCOSE BLOOD QUANT: CPT

## 2022-12-21 PROCEDURE — 85025 COMPLETE CBC W/AUTO DIFF WBC: CPT

## 2022-12-21 PROCEDURE — 80048 BASIC METABOLIC PNL TOTAL CA: CPT

## 2022-12-21 PROCEDURE — 36415 COLL VENOUS BLD VENIPUNCTURE: CPT

## 2022-12-21 PROCEDURE — 97530 THERAPEUTIC ACTIVITIES: CPT

## 2022-12-21 PROCEDURE — 97166 OT EVAL MOD COMPLEX 45 MIN: CPT

## 2022-12-21 PROCEDURE — 2580000003 HC RX 258: Performed by: STUDENT IN AN ORGANIZED HEALTH CARE EDUCATION/TRAINING PROGRAM

## 2022-12-21 PROCEDURE — 85730 THROMBOPLASTIN TIME PARTIAL: CPT

## 2022-12-21 RX ORDER — OXYCODONE HYDROCHLORIDE 10 MG/1
10 TABLET ORAL EVERY 6 HOURS PRN
Qty: 20 TABLET | Refills: 0 | Status: SHIPPED | OUTPATIENT
Start: 2022-12-21 | End: 2022-12-26

## 2022-12-21 RX ORDER — METHOCARBAMOL 750 MG/1
750 TABLET, FILM COATED ORAL EVERY 6 HOURS
Qty: 40 TABLET | Refills: 0 | Status: SHIPPED | OUTPATIENT
Start: 2022-12-21 | End: 2022-12-31

## 2022-12-21 RX ORDER — GABAPENTIN 300 MG/1
600 CAPSULE ORAL EVERY 8 HOURS
Status: DISCONTINUED | OUTPATIENT
Start: 2022-12-21 | End: 2022-12-21 | Stop reason: HOSPADM

## 2022-12-21 RX ADMIN — INSULIN LISPRO 4 UNITS: 100 INJECTION, SOLUTION INTRAVENOUS; SUBCUTANEOUS at 11:23

## 2022-12-21 RX ADMIN — OXYCODONE 10 MG: 5 TABLET ORAL at 01:20

## 2022-12-21 RX ADMIN — METHOCARBAMOL TABLETS 750 MG: 750 TABLET, COATED ORAL at 14:28

## 2022-12-21 RX ADMIN — RIVAROXABAN 15 MG: 15 TABLET, FILM COATED ORAL at 17:58

## 2022-12-21 RX ADMIN — Medication 81 MG: at 07:59

## 2022-12-21 RX ADMIN — RIVAROXABAN 15 MG: 15 TABLET, FILM COATED ORAL at 10:47

## 2022-12-21 RX ADMIN — LISINOPRIL 20 MG: 20 TABLET ORAL at 07:58

## 2022-12-21 RX ADMIN — GABAPENTIN 600 MG: 300 CAPSULE ORAL at 12:24

## 2022-12-21 RX ADMIN — KETOROLAC TROMETHAMINE 15 MG: 15 INJECTION, SOLUTION INTRAMUSCULAR; INTRAVENOUS at 07:58

## 2022-12-21 RX ADMIN — OXYCODONE 10 MG: 5 TABLET ORAL at 18:20

## 2022-12-21 RX ADMIN — OXYCODONE 10 MG: 5 TABLET ORAL at 12:23

## 2022-12-21 RX ADMIN — METHOCARBAMOL TABLETS 750 MG: 750 TABLET, COATED ORAL at 01:20

## 2022-12-21 RX ADMIN — METHADONE HYDROCHLORIDE 105 MG: 5 SOLUTION ORAL at 09:47

## 2022-12-21 RX ADMIN — KETOROLAC TROMETHAMINE 15 MG: 15 INJECTION, SOLUTION INTRAMUSCULAR; INTRAVENOUS at 01:20

## 2022-12-21 RX ADMIN — ACETAMINOPHEN 1000 MG: 500 TABLET, FILM COATED ORAL at 06:25

## 2022-12-21 RX ADMIN — OXYCODONE 10 MG: 5 TABLET ORAL at 06:25

## 2022-12-21 RX ADMIN — GABAPENTIN 300 MG: 300 CAPSULE ORAL at 03:21

## 2022-12-21 RX ADMIN — KETOROLAC TROMETHAMINE 15 MG: 15 INJECTION, SOLUTION INTRAMUSCULAR; INTRAVENOUS at 14:28

## 2022-12-21 RX ADMIN — ACETAMINOPHEN 1000 MG: 500 TABLET, FILM COATED ORAL at 14:28

## 2022-12-21 RX ADMIN — METHOCARBAMOL TABLETS 750 MG: 750 TABLET, COATED ORAL at 07:58

## 2022-12-21 RX ADMIN — SODIUM CHLORIDE, PRESERVATIVE FREE 10 ML: 5 INJECTION INTRAVENOUS at 07:59

## 2022-12-21 ASSESSMENT — PAIN DESCRIPTION - ORIENTATION
ORIENTATION: LEFT
ORIENTATION: LEFT

## 2022-12-21 ASSESSMENT — PAIN SCALES - GENERAL
PAINLEVEL_OUTOF10: 8
PAINLEVEL_OUTOF10: 8
PAINLEVEL_OUTOF10: 10
PAINLEVEL_OUTOF10: 8
PAINLEVEL_OUTOF10: 0
PAINLEVEL_OUTOF10: 9
PAINLEVEL_OUTOF10: 8
PAINLEVEL_OUTOF10: 8
PAINLEVEL_OUTOF10: 10

## 2022-12-21 ASSESSMENT — PAIN SCALES - WONG BAKER
WONGBAKER_NUMERICALRESPONSE: 8
WONGBAKER_NUMERICALRESPONSE: 10
WONGBAKER_NUMERICALRESPONSE: 8
WONGBAKER_NUMERICALRESPONSE: 10
WONGBAKER_NUMERICALRESPONSE: 10
WONGBAKER_NUMERICALRESPONSE: 8
WONGBAKER_NUMERICALRESPONSE: 10;8
WONGBAKER_NUMERICALRESPONSE: 8
WONGBAKER_NUMERICALRESPONSE: 10;8
WONGBAKER_NUMERICALRESPONSE: 10
WONGBAKER_NUMERICALRESPONSE: 10;8
WONGBAKER_NUMERICALRESPONSE: 10

## 2022-12-21 ASSESSMENT — PAIN DESCRIPTION - ONSET: ONSET: ON-GOING

## 2022-12-21 ASSESSMENT — PAIN DESCRIPTION - LOCATION
LOCATION: FOOT;LEG
LOCATION: FOOT;LEG

## 2022-12-21 ASSESSMENT — PAIN - FUNCTIONAL ASSESSMENT
PAIN_FUNCTIONAL_ASSESSMENT: PREVENTS OR INTERFERES SOME ACTIVE ACTIVITIES AND ADLS
PAIN_FUNCTIONAL_ASSESSMENT: PREVENTS OR INTERFERES SOME ACTIVE ACTIVITIES AND ADLS

## 2022-12-21 ASSESSMENT — PAIN DESCRIPTION - DESCRIPTORS: DESCRIPTORS: ACHING;BURNING;SQUEEZING

## 2022-12-21 ASSESSMENT — PAIN DESCRIPTION - PAIN TYPE: TYPE: CHRONIC PAIN

## 2022-12-21 ASSESSMENT — PAIN DESCRIPTION - FREQUENCY: FREQUENCY: CONTINUOUS
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
## 2022-12-21 NOTE — DISCHARGE INSTRUCTIONS
Ok to shower    Remove steristrips in 5-6 days    Wash and clean foot daily, dry and cover with non-adhesive dressings and light compression    Follow up in the office in 2-3 weeks, sooner if there are any issues    Take it easy, but walking is good

## 2022-12-21 NOTE — DISCHARGE SUMMARY
VASCULAR SURGERY DISCHARGE SUMMARY:    Disposition: DISCHARGE TO home    PATIENT NAME: Debra Chinchilla    YOB: 1970  MEDICAL RECORD NO. [id number]  DATE: 12/21/2022  ADMITTING PHYSICIAN: Dr. Valero Side: Rick Herrera MD  DISCHARGE DATE:  12/21/22  DISPOSITION: to Home  ADMITTING DIAGNOSIS:   1. Limb ischemia    2. S/P femoral-popliteal bypass surgery    3. Gangrene of left foot ([facility]      DISCHARGE DIAGNOSIS:   Patient Active Problem List   Diagnosis Code    Type 2 diabetes mellitus, with long-term current use of insulin ([facility] E11.9, Z79.4    Smoker F17.200    Furuncle of neck L02.12    Sebaceous cyst L72.3    Opioid dependence with withdrawal ([facility]) F11.23    Anxiety and depression F41.9, F32. A    Facial droop R29.810    Essential hypertension I10    Dependence on nicotine from cigarettes F17.210    Bell's palsy G51.0    Critical limb ischemia of left lower extremity with rest pain ([facility]) I70.222    Swelling of both lower extremities M79.89    Popliteal artery embolism, left ([facility]) I74.3    PAD (peripheral artery disease) ([facility]) I73.9    Fissure in skin of foot R23.4    Gangrene of left foot ([facility]) I96    Limb ischemia I99.8     CONSULTANTS:  none    PROCEDURES:  Left femoral to popliteal bypass with endarterectomy and patch angioplasty    HOSPITAL COURSE:   Debra Chinchilla is a 50 y.o. male who was admitted on 12/19/22 for open revascularization of left leg for ischemic changes to his foot. His surgery went well, pain control was main issue but we were able to get to a good point by day 2. His wounds were healing well, foot wounds were stable and proceeding towards drying up along the edges. Wife was shown how to manage wounds and she is a nurse and has been doing it for a while. He was discharged on multimodal pain control tylenol/motrin, robaxin, Neurontin and he will go to pain clinic for his methadone. Will keep him on anticoagulation for now along with aspirin daily. At time of discharge, Michelet French was tolerating a regular diet, having bowel movements, ambulating on his own accord, had adequate analgesia on oral pain medications, and had no signs of symptoms of complications. He was deemed medically stable and discharged to home on 12/21/22 with instructions to follow up. Pt expressed understanding of and agreement with DC plans. LABS:     Recent Labs     12/19/22 2004 12/20/22  0323 12/21/22  0446   WBC 13.4* 14.0* 9.0   HGB 11.3* 9.8* 9.8*   HCT 35.5* 32.1* 31.1*    298 282    132* 138   K 4.2 3.9 4.1    98 104   CO2 23 24 27   BUN 12 16 15   CREATININE 0.59* 0.61* 0.52*       DIAGNOSTIC TESTS:     No results found. DISCHARGE INSTRUCTIONS:      Discharge Medications:        Medication List        START taking these medications      methocarbamol 750 MG tablet  Commonly known as: ROBAXIN  Take 1 tablet by mouth in the morning and 1 tablet at noon and 1 tablet in the evening and 1 tablet before bedtime. Do all this for 10 days. oxyCODONE HCl 10 MG immediate release tablet  Commonly known as: OXY-IR  Take 1 tablet by mouth every 6 hours as needed for Pain (postoperative surgical pain) for up to 5 days. Max Daily Amount: 40 mg     rivaroxaban 20 MG Tabs tablet  Commonly known as: Xarelto  Take 1 tablet by mouth daily (with breakfast)  Replaces: rivaroxaban 15 & 20 MG Starter Pack            CONTINUE taking these medications      aspirin 81 MG EC tablet     atorvastatin 80 MG tablet  Commonly known as: LIPITOR  Take 1 tablet by mouth nightly     blood glucose test strips strip  Commonly known as: ASCENSIA AUTODISC VI;ONE TOUCH ULTRA TEST VI     furosemide 20 MG tablet  Commonly known as: LASIX     gabapentin 600 MG tablet  Commonly known as: Neurontin  Take 1 tablet by mouth 3 times daily for 30 days.      hydroCHLOROthiazide 12.5 MG capsule  Commonly known as: MICROZIDE     Insulin Pen Needle 32G X 4 MM Misc  1 each by Does not apply route daily     Lancets Misc     lisinopril 20 MG tablet  Commonly known as: PRINIVIL;ZESTRIL     methadone 10 MG/5ML solution     nicotine 14 MG/24HR  Commonly known as: NICODERM CQ  Place 1 patch onto the skin daily     TOUJEO SOLOSTAR SC            STOP taking these medications      HYDROcodone-acetaminophen 5-325 MG per tablet  Commonly known as: NORCO     rivaroxaban 15 & 20 MG Starter Pack  Replaced by: rivaroxaban 20 MG Tabs tablet               Where to Get Your Medications        These medications were sent to [facility] [address] [facility], [address], [address] [address]      Phone: [phone]   methocarbamol 750 MG tablet  oxyCODONE HCl 10 MG immediate release tablet  rivaroxaban 20 MG Tabs tablet       Diet: ADULT DIET;  Regular diet as tolerated  Activity: - Avoid strenuous activity or exercise until cleared during follow-up appointment  - No driving or operating heavy machinery while taking narcotics   Wound Care: Daily and as needed  Follow-up:   Call [facility] Vascular Surgery at [phone] for follow up appointment with Dr. Rebeka Arteaga in:  2weeks  Follow up in the next few weeks with PCP: MD Ju Ackerman MD  12/21/2022, 5:23 PM

## 2022-12-21 NOTE — PROGRESS NOTES
[facility] [address]  PROGRESS NOTE    Shift date: 12/20/2022  Shift day: Tuesday   Shift # 2    Room # 1005/1005-01   Name: Yoel Contreras                Baptism: Unknown   Place of Orthodox: Unknown    Referral: Routine Visit    Admit Date & Time: 12/19/2022  8:54 AM    Assessment:  Yoel Contreras is a 50 y.o. male in the hospital because limb ischemia. Upon entering the room writer observes patient sitting up in bed. Patient's spouse is at the end of the bed rubbing the patients foot. Patient's daughter is sitting in another chair a few feet away. Intervention:  Writer introduced self and title as  Writer offered space for patient and family  to express feelings, needs, and concerns and provided a ministry presence. Patient says that he is doing well and that he is getting through. Patient says that he does not have any needs at this time. The patient's spouse and daughter also say they are alright. Outcome:  Patient thanks  for stopping by and checking on him. He knows how to reach spiritual care if needed. Plan:  Chaplains will remain available to offer spiritual and emotional support as needed. Electronically signed by Marisela Schilling Resident, on 12/20/2022 at 7:49 PM.  [facility]  [phone]       12/20/22 1948   Encounter Summary   Service Provided For: Patient;Patient and family together   Referral/Consult From: [address] [place] [address] Drive; Children   Last Encounter  12/20/22   Complexity of Encounter Moderate   Begin Time 1512   End Time  1515   Total Time Calculated 3 min   Assessment/Intervention/Outcome   Assessment Calm;Coping   Intervention Active listening;Sustaining Presence/Ministry of presence   Outcome Expressed Gratitude; Connection/Belonging;Engaged in conversation

## 2022-12-21 NOTE — PLAN OF CARE
Problem: Discharge Planning  Goal: Discharge to home or other facility with appropriate resources  Outcome: Progressing     Problem: Safety - Adult  Goal: Free from fall injury  Outcome: Progressing     Problem: ABCDS Injury Assessment  Goal: Absence of physical injury  Outcome: Progressing     Problem: Pain  Goal: Verbalizes/displays adequate comfort level or baseline comfort level  Outcome: Progressing  Flowsheets  Taken 12/20/2022 1600 by Taylor Glass RN  Verbalizes/displays adequate comfort level or baseline comfort level:   Encourage patient to monitor pain and request assistance   Assess pain using appropriate pain scale   Administer analgesics based on type and severity of pain and evaluate response   Implement non-pharmacological measures as appropriate and evaluate response  Taken 12/20/2022 1200 by Taylor Glass RN  Verbalizes/displays adequate comfort level or baseline comfort level:   Encourage patient to monitor pain and request assistance   Assess pain using appropriate pain scale   Administer analgesics based on type and severity of pain and evaluate response     Problem: Chronic Conditions and Co-morbidities  Goal: Patient's chronic conditions and co-morbidity symptoms are monitored and maintained or improved  Outcome: Progressing     Problem: Skin/Tissue Integrity  Goal: Absence of new skin breakdown  Description: 1. Monitor for areas of redness and/or skin breakdown  2. Assess vascular access sites hourly  3. Every 4-6 hours minimum:  Change oxygen saturation probe site  4. Every 4-6 hours:  If on nasal continuous positive airway pressure, respiratory therapy assess nares and determine need for appliance change or resting period.   Outcome: Progressing

## 2022-12-21 NOTE — PROGRESS NOTES
ATTENDING ATTESTATION: I personally examined Yoel Contreras today and confirmed the pertinent history, exam and medical decision making in the resident's note. Please note there may be additional comments below. Additional Comments: Pain continues to be an issue but tremendously better since revascularization. Left foot is warm and well perfused with palpable DP pulse. Wounds are starting to dry up along the edges. He worked with therapy today, was able to go up 8 stairs and walk around the hallway. He would like to go home, his wife is a nurse and can help manage his wound care like she has been doing. Will plan to send him home on Oxicodone 10 mg q6 as needed for breakthru pain, neurontin 600 mg TID, tylenol and motrin 3 times a day, he will follow up with pain clinic for his methadone. Wound care to foot will be to wash and keep dry, cover with non-adhesive dressing folowed by light compressive wrap to his calf. He will follow up in 2 weeks in the office, sooner if there are any issues. Electronically signed by Nimisha Mao MD on 12/21/2022 at 5:06 PM      [address] [place] [address],[address]: [phone]  C: [phone]  Email: [email or web address]           Division of Vascular Surgery         Progress Note      Name: Yoel Contreras  MRN: [id number]         Subjective:     Patient seen and examined at bedside. No acute events overnight. Hemodynamically stable, and afebrile. Patient is s/p left femoral to popliteal bypass with RGSV. Patient reports continued pain. States pain medication is working but not lasting long enough. Reports that his legs are feeling better overall though. Denies chest pain, shortness of breath, nausea, vomiting, fever or chills.      Physical Exam:     Vitals:  /82   Pulse 64   Temp 98.4 °F (36.9 °C) (Oral)   Resp 13   Ht 6' (1.829 m)   Wt 219 lb 9.3 oz (99.6 kg)   SpO2 93%   BMI 29.78 kg/m²     Physical Exam  Constitutional: General: He is not in acute distress. Appearance: Normal appearance. HENT:      Head: Normocephalic and atraumatic. Nose: Nose normal.      Mouth/Throat:      Mouth: Mucous membranes are moist.   Eyes:      Extraocular Movements: Extraocular movements intact. Cardiovascular:      Rate and Rhythm: Normal rate and regular rhythm. Comments: Doppler signals R DP/PT   Doppler signals L DP   Pulmonary:      Effort: Pulmonary effort is normal. No respiratory distress. Abdominal:      General: There is no distension. Palpations: Abdomen is soft. Musculoskeletal:         General: Swelling present. Cervical back: Neck supple. Comments: Mild bilateral lower extremity edema    Skin:     General: Skin is warm and dry. Capillary Refill: Capillary refill takes less than 2 seconds. Comments: Surgical dressings are clean, dry, and intact    Neurological:      General: No focal deficit present. Mental Status: He is alert.        Imaging/Labs:     12/20/22 labs reviewed       Assessment/Plan:     Continue MMPT, will increase gabapentin   Methadone resumed   Will resume home Xarelto and discontinue heparin drip   Regular diet   Encourage incentive spirometer use   Okay to be out of bed   Okay for transfer to stepdown today     Electronically signed by Yamile Mak DO on 12/20/22 at 5:28 AM EST      [address] [place] [address],[address]: [phone]  C: [phone]
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
## 2023-01-10 ENCOUNTER — OFFICE VISIT (OUTPATIENT)
Dept: VASCULAR SURGERY | Age: 53
End: 2023-01-10

## 2023-01-10 VITALS
WEIGHT: 231 LBS | BODY MASS INDEX: 31.29 KG/M2 | HEART RATE: 80 BPM | RESPIRATION RATE: 17 BRPM | DIASTOLIC BLOOD PRESSURE: 77 MMHG | OXYGEN SATURATION: 96 % | SYSTOLIC BLOOD PRESSURE: 151 MMHG | HEIGHT: 72 IN

## 2023-01-10 DIAGNOSIS — I70.222 CRITICAL LIMB ISCHEMIA OF LEFT LOWER EXTREMITY WITH REST PAIN (HCC): ICD-10-CM

## 2023-01-10 DIAGNOSIS — I74.3 POPLITEAL ARTERY EMBOLISM, LEFT (HCC): ICD-10-CM

## 2023-01-10 DIAGNOSIS — Z95.828 STATUS POST FEMORAL-POPLITEAL BYPASS SURGERY: Primary | ICD-10-CM

## 2023-01-10 DIAGNOSIS — I96 GANGRENE OF LEFT FOOT (HCC): ICD-10-CM

## 2023-01-10 DIAGNOSIS — I99.8 LIMB ISCHEMIA: ICD-10-CM

## 2023-01-10 DIAGNOSIS — I73.9 PAD (PERIPHERAL ARTERY DISEASE) (HCC): ICD-10-CM

## 2023-01-10 PROCEDURE — 99024 POSTOP FOLLOW-UP VISIT: CPT | Performed by: SURGERY

## 2023-01-10 RX ORDER — NICOTINE 21 MG/24HR
PATCH, TRANSDERMAL 24 HOURS TRANSDERMAL
COMMUNITY
Start: 2023-01-03

## 2023-01-10 RX ORDER — DOXYCYCLINE HYCLATE 100 MG
100 TABLET ORAL 2 TIMES DAILY
Qty: 28 TABLET | Refills: 0 | Status: SHIPPED | OUTPATIENT
Start: 2023-01-10 | End: 2023-01-24

## 2023-01-10 NOTE — PROGRESS NOTES
Division of Vascular Surgery        Postoperative Follow Up    LLE angiogram, left above knee popliteal artery endarterectomy with patch angioplasty, left below knee popliteal artery, anterior tibial and TPT endarterectomy, popliteal bypass with reversed great saphenous vein, debridement of foot (12/19/22)    History of Present Illness:      Larry Anderson is a 48 y.o. gentleman presents for postoperative follow up after undergoing left lower extremity revascularization for popliteal occlusive disease and necrotic wounds to his foot. Continues to have pain and numbness in his foot. Denies significant claudication like he was having before surgery. His wounds do appear worse but suspect related to reperfusion and swelling ,causing edges to get worse. He has been trying to keep them clean, unable to wrap legs to help with swelling. Wounds debrided back a bit in the office, excellent bleeding when edges trimmed back. Will increase diuretics 40 mg bid x 1 day then 40 mg daily    Review of Systems:     Review of Systems   Constitutional:  Positive for chills. Negative for fever. HENT:  Negative for congestion. Eyes:  Negative for visual disturbance. Respiratory:  Negative for chest tightness and shortness of breath. Cardiovascular:  Positive for leg swelling. Negative for chest pain. Gastrointestinal:  Negative for abdominal pain. Endocrine: Negative. Genitourinary: Negative. Musculoskeletal: Negative. Skin:  Positive for color change and wound. Allergic/Immunologic: Negative. Neurological:  Positive for numbness. Negative for weakness. Hematological: Negative. Psychiatric/Behavioral: Negative.        Physical Exam:     Vitals:  BP (!) 151/77 (Site: Left Upper Arm, Position: Sitting, Cuff Size: Medium Adult)   Pulse 80   Resp 17   Ht 6' (1.829 m)   Wt 231 lb (104.8 kg)   SpO2 96%   BMI 31.33 kg/m²     Physical Exam  Constitutional:       Appearance: He is well-developed and well-groomed. Eyes:      Extraocular Movements: Extraocular movements intact. Conjunctiva/sclera: Conjunctivae normal.   Cardiovascular:      Rate and Rhythm: Normal rate and regular rhythm. Pulses:           Dorsalis pedis pulses are detected w/ Doppler on the left side. Posterior tibial pulses are detected w/ Doppler on the left side. Comments: Palpable graft pulse behind the knee, difficult to palpate pedal pulses, multiphasic doppler signals noted  Pulmonary:      Effort: Pulmonary effort is normal. No respiratory distress. Abdominal:      Palpations: Abdomen is soft. Tenderness: There is no abdominal tenderness. Musculoskeletal:      Cervical back: Full passive range of motion without pain. Right lower leg: No edema. Left lower leg: Swelling present. No tenderness. No edema. Left foot: Normal capillary refill. Swelling and tenderness present. Feet:      Left foot:      Skin integrity: Ulcer, skin breakdown, callus and dry skin present. No erythema. Skin:     General: Skin is warm. Capillary Refill: Capillary refill takes less than 2 seconds. Neurological:      Mental Status: He is alert and oriented to person, place, and time. GCS: GCS eye subscore is 4. GCS verbal subscore is 5. GCS motor subscore is 6. Sensory: Sensory deficit (left foot numbness) present. Motor: Motor function is intact.    Psychiatric:         Mood and Affect: Mood normal.         Speech: Speech normal.         Behavior: Behavior normal.     December 1, 2022                December 13, 2022          January 10, 2023                 Imaging/Labs:     None performed    Assessment and Plan:     Left lower extremity bypass for peripheral arterial disease and necrotic wounds to foot  Will likely need operative debridement at some point  He would like to hold off and continue with local wound care  Advised him to elevate legs and attempt compression wraps to help with swelling, this will assist in getting wounds better  He will follow up in a couple of weeks for wound check.  Continue Xarelto, suspicion that he had embolic event to popliteal artery given operative findings and prolong symptomology.    Start him on doxycycline to prevent wounds from getting infected due to moisture.    Electronically signed by Geeta Suh MD on 1/10/23 at 11:04 AM [facility]  O: [phone]  C: [phone]  Email: [email or web address]

## 2023-01-23 RX ORDER — GABAPENTIN 600 MG/1
600 TABLET ORAL 3 TIMES DAILY
Qty: 90 TABLET | Refills: 1 | Status: SHIPPED | OUTPATIENT
Start: 2023-01-23 | End: 2023-02-22

## 2023-02-09 ENCOUNTER — OFFICE VISIT (OUTPATIENT)
Dept: VASCULAR SURGERY | Age: 53
End: 2023-02-09

## 2023-02-09 VITALS
HEIGHT: 72 IN | OXYGEN SATURATION: 94 % | WEIGHT: 229 LBS | BODY MASS INDEX: 31.02 KG/M2 | TEMPERATURE: 98.1 F | RESPIRATION RATE: 16 BRPM | SYSTOLIC BLOOD PRESSURE: 127 MMHG | DIASTOLIC BLOOD PRESSURE: 77 MMHG | HEART RATE: 90 BPM

## 2023-02-09 DIAGNOSIS — I73.9 PAD (PERIPHERAL ARTERY DISEASE) (HCC): ICD-10-CM

## 2023-02-09 DIAGNOSIS — I99.8 LIMB ISCHEMIA: Primary | ICD-10-CM

## 2023-02-09 DIAGNOSIS — I74.3 POPLITEAL ARTERY EMBOLISM, LEFT (HCC): ICD-10-CM

## 2023-02-09 DIAGNOSIS — I70.222 CRITICAL LIMB ISCHEMIA OF LEFT LOWER EXTREMITY WITH REST PAIN (HCC): ICD-10-CM

## 2023-02-09 PROCEDURE — 99024 POSTOP FOLLOW-UP VISIT: CPT | Performed by: SURGERY

## 2023-02-09 RX ORDER — LISINOPRIL AND HYDROCHLOROTHIAZIDE 20; 12.5 MG/1; MG/1
TABLET ORAL
COMMUNITY
Start: 2023-01-28

## 2023-02-09 RX ORDER — SODIUM HYPOCHLORITE 1.25 MG/ML
SOLUTION TOPICAL DAILY
Qty: 500 ML | Refills: 0 | Status: SHIPPED | OUTPATIENT
Start: 2023-02-09

## 2023-02-09 RX ORDER — DOXYCYCLINE HYCLATE 100 MG
100 TABLET ORAL 2 TIMES DAILY
Qty: 28 TABLET | Refills: 0 | Status: SHIPPED | OUTPATIENT
Start: 2023-02-09 | End: 2023-02-23

## 2023-02-09 RX ORDER — FUROSEMIDE 40 MG/1
TABLET ORAL
COMMUNITY
Start: 2023-01-19

## 2023-02-09 RX ORDER — DULOXETIN HYDROCHLORIDE 30 MG/1
CAPSULE, DELAYED RELEASE ORAL
COMMUNITY
Start: 2023-01-19

## 2023-02-09 RX ORDER — GABAPENTIN 300 MG/1
900 CAPSULE ORAL 3 TIMES DAILY
Qty: 90 CAPSULE | Refills: 3 | Status: SHIPPED | OUTPATIENT
Start: 2023-02-09 | End: 2023-03-09 | Stop reason: SDUPTHER

## 2023-02-09 NOTE — PROGRESS NOTES
Division of Vascular Surgery        Postoperative Follow Up        Vitals:  /77 (Site: Right Upper Arm, Position: Sitting, Cuff Size: Medium Adult)   Pulse 90   Temp 98.1 °F (36.7 °C)   Resp 16   Ht 6' (1.829 m)   Wt 229 lb (103.9 kg)   SpO2 94%   BMI 31.06 kg/m²     Excellent signals, bypass is open    Wound has smell to it in foot    Will attempt santyl for now  F/u in two weeks    Suspect he will need open debridement at some point if this does not improve in the upcoming few weeks    Pascalekens for 1 week then santyl               Electronically signed by Arun Butler MD on 2/9/23 at 4:02 PM [address]: [phone]  C: [phone]  Email: [email or web address]

## 2023-03-09 ENCOUNTER — OFFICE VISIT (OUTPATIENT)
Dept: VASCULAR SURGERY | Age: 53
End: 2023-03-09

## 2023-03-09 VITALS
BODY MASS INDEX: 32.51 KG/M2 | DIASTOLIC BLOOD PRESSURE: 73 MMHG | HEART RATE: 78 BPM | TEMPERATURE: 99.5 F | HEIGHT: 72 IN | OXYGEN SATURATION: 98 % | SYSTOLIC BLOOD PRESSURE: 137 MMHG | RESPIRATION RATE: 20 BRPM | WEIGHT: 240 LBS

## 2023-03-09 DIAGNOSIS — I99.8 LIMB ISCHEMIA: ICD-10-CM

## 2023-03-09 DIAGNOSIS — Z95.828 S/P FEMORAL-POPLITEAL BYPASS SURGERY: Primary | ICD-10-CM

## 2023-03-09 DIAGNOSIS — I70.222 CRITICAL LIMB ISCHEMIA OF LEFT LOWER EXTREMITY WITH REST PAIN (HCC): ICD-10-CM

## 2023-03-09 DIAGNOSIS — I73.9 PAD (PERIPHERAL ARTERY DISEASE) (HCC): ICD-10-CM

## 2023-03-09 PROCEDURE — 99024 POSTOP FOLLOW-UP VISIT: CPT | Performed by: SURGERY

## 2023-03-09 RX ORDER — GABAPENTIN 300 MG/1
900 CAPSULE ORAL 3 TIMES DAILY
Qty: 270 CAPSULE | Refills: 3 | Status: SHIPPED | OUTPATIENT
Start: 2023-03-09 | End: 2023-04-08

## 2023-03-09 ASSESSMENT — ENCOUNTER SYMPTOMS
ABDOMINAL PAIN: 0
CHEST TIGHTNESS: 0
ALLERGIC/IMMUNOLOGIC NEGATIVE: 1
SHORTNESS OF BREATH: 0
COLOR CHANGE: 1

## 2023-03-09 NOTE — PROGRESS NOTES
Division of Vascular Surgery        Postoperative Follow Up    LLE angiogram, left above knee popliteal artery endarterectomy with patch angioplasty, left below knee popliteal artery, anterior tibial and TPT endarterectomy, popliteal bypass with reversed great saphenous vein, debridement of foot (12/19/22)    History of Present Illness:      Hesham Sharma is a 48 y.o. gentleman presents for postoperative follow up after undergoing open revascularization of his left lower extremity. His wounds have improved significantly. He used [employer] for a bit but was not able to obtain santyl. They have been able to slowly remove dry skin and callus from edges of wounds after he showers. Swelling is down too. Continues to have severe neuropathy type pain, like walking on marbles. Increasing neurontin to 900 mg three times a day has helped tremendously. He denies symptoms of rest pain and is able to walk better then he did before surgery. Review of Systems:     Review of Systems   Constitutional:  Positive for chills. Negative for fever. HENT:  Negative for congestion. Eyes:  Negative for visual disturbance. Respiratory:  Negative for chest tightness and shortness of breath. Cardiovascular:  Positive for leg swelling. Negative for chest pain. Gastrointestinal:  Negative for abdominal pain. Endocrine: Negative. Genitourinary: Negative. Musculoskeletal: Negative. Skin:  Positive for color change and wound. Allergic/Immunologic: Negative. Neurological:  Positive for numbness. Negative for weakness. Hematological: Negative. Psychiatric/Behavioral: Negative.        Physical Exam:     Vitals:  /73 (Site: Right Upper Arm, Position: Sitting, Cuff Size: Large Adult)   Pulse 78   Temp 99.5 °F (37.5 °C) (Temporal)   Resp 20   Ht 6' (1.829 m)   Wt 240 lb (108.9 kg)   SpO2 98%   BMI 32.55 kg/m²     Physical Exam  Constitutional:       Appearance: He is well-developed and well-groomed. Eyes:      Extraocular Movements: Extraocular movements intact. Conjunctiva/sclera: Conjunctivae normal.   Cardiovascular:      Rate and Rhythm: Normal rate and regular rhythm. Pulses:           Dorsalis pedis pulses are 2+ on the left side. Comments: Palpable graft pulse behind the knee  Pulmonary:      Effort: Pulmonary effort is normal. No respiratory distress. Abdominal:      Palpations: Abdomen is soft. Tenderness: There is no abdominal tenderness. Musculoskeletal:      Cervical back: Full passive range of motion without pain. Right lower leg: No edema. Left lower leg: Swelling present. No tenderness. No edema. Left foot: Normal capillary refill. Swelling and tenderness present. Feet:      Left foot:      Skin integrity: Ulcer, skin breakdown, callus and dry skin present. No erythema. Skin:     General: Skin is warm. Capillary Refill: Capillary refill takes less than 2 seconds. Neurological:      Mental Status: He is alert and oriented to person, place, and time. GCS: GCS eye subscore is 4. GCS verbal subscore is 5. GCS motor subscore is 6. Sensory: Sensory deficit (left foot numbness) present. Motor: Motor function is intact.    Psychiatric:         Mood and Affect: Mood normal.         Speech: Speech normal.         Behavior: Behavior normal.     December 1, 2022                 December 13, 2022          January 10, 2023                  February 9, 2023             March 9, 2023            Imaging/Labs:     none    Assessment and Plan:     Left leg ischemia, femoral popliteal artery occlusion, left foot wounds, s/p femoral to popliteal bypass  Continue local wound care  Does not need Nicole or santyl  Advised to keep wounds covered  Work on compression stockings as wound completely heal  New pressure wound develop din left metatarsal head, superficial  Wife will get some offloading disks to put in his shoes which will help  Glad to see things finally getting better for him  Discussed with him to start working on titration Neurontin down as the next few weeks come  Neurontin renewed for now to help him get through the severe neuropathy  Understands that this may be a permanent thing  Start by removing mid day dose and then dropping total dosage every few weeks  He will follow up in a couple of months with surveillance imaging with duplex on next visit  If things go sour he knows how to get a hold of me and will come in sooner  Continue aspirin and Xarelto for now  May consider dropping Xarelto to PAD dosing on next visit    Electronically signed by Stephanie Jackson MD on 3/9/23 at 2:34 PM [address]: [phone]  C: [phone]  Email: [email or web address]

## 2023-03-12 PROBLEM — Z95.828 S/P FEMORAL-POPLITEAL BYPASS SURGERY: Status: ACTIVE | Noted: 2023-03-12

## 2023-03-12 ASSESSMENT — ENCOUNTER SYMPTOMS
ALLERGIC/IMMUNOLOGIC NEGATIVE: 1
SHORTNESS OF BREATH: 0
COLOR CHANGE: 1
CHEST TIGHTNESS: 0
ABDOMINAL PAIN: 0

## 2023-04-20 ENCOUNTER — OFFICE VISIT (OUTPATIENT)
Dept: VASCULAR SURGERY | Age: 53
End: 2023-04-20
Payer: COMMERCIAL

## 2023-04-20 VITALS
WEIGHT: 242 LBS | SYSTOLIC BLOOD PRESSURE: 140 MMHG | TEMPERATURE: 98.1 F | OXYGEN SATURATION: 96 % | DIASTOLIC BLOOD PRESSURE: 89 MMHG | HEIGHT: 72 IN | RESPIRATION RATE: 17 BRPM | HEART RATE: 78 BPM | BODY MASS INDEX: 32.78 KG/M2

## 2023-04-20 DIAGNOSIS — I73.9 PAD (PERIPHERAL ARTERY DISEASE) (HCC): ICD-10-CM

## 2023-04-20 DIAGNOSIS — M86.172 OSTEOMYELITIS OF FOOT, LEFT, ACUTE (HCC): Primary | ICD-10-CM

## 2023-04-20 DIAGNOSIS — Z95.828 STATUS POST FEMORAL-POPLITEAL BYPASS SURGERY: ICD-10-CM

## 2023-04-20 DIAGNOSIS — M79.89 SWELLING OF BOTH LOWER EXTREMITIES: ICD-10-CM

## 2023-04-20 PROCEDURE — 3074F SYST BP LT 130 MM HG: CPT | Performed by: SURGERY

## 2023-04-20 PROCEDURE — 99214 OFFICE O/P EST MOD 30 MIN: CPT | Performed by: SURGERY

## 2023-04-20 PROCEDURE — 3078F DIAST BP <80 MM HG: CPT | Performed by: SURGERY

## 2023-04-20 RX ORDER — DOXYCYCLINE HYCLATE 100 MG
100 TABLET ORAL 2 TIMES DAILY
Qty: 20 TABLET | Refills: 0 | Status: SHIPPED | OUTPATIENT
Start: 2023-04-20

## 2023-04-20 RX ORDER — DOXYCYCLINE HYCLATE 100 MG
100 TABLET ORAL 2 TIMES DAILY
COMMUNITY
End: 2023-04-20 | Stop reason: SDUPTHER

## 2023-04-24 PROBLEM — M86.172 OSTEOMYELITIS OF FOOT, LEFT, ACUTE (HCC): Status: ACTIVE | Noted: 2023-04-24

## 2023-04-24 ASSESSMENT — ENCOUNTER SYMPTOMS
ALLERGIC/IMMUNOLOGIC NEGATIVE: 1
BACK PAIN: 1
COLOR CHANGE: 1
CHEST TIGHTNESS: 0
ABDOMINAL PAIN: 0
SHORTNESS OF BREATH: 0

## 2023-05-03 ENCOUNTER — HOSPITAL ENCOUNTER (OUTPATIENT)
Age: 53
Setting detail: SPECIMEN
Discharge: HOME | End: 2023-05-03

## 2023-05-03 ENCOUNTER — HOSPITAL ENCOUNTER (OUTPATIENT)
Dept: GENERAL RADIOLOGY | Facility: CLINIC | Age: 53
Discharge: HOME | End: 2023-05-05
Payer: COMMERCIAL

## 2023-05-03 ENCOUNTER — HOSPITAL ENCOUNTER (OUTPATIENT)
Facility: CLINIC | Age: 53
Discharge: HOME | End: 2023-05-05
Payer: COMMERCIAL

## 2023-05-03 ENCOUNTER — OFFICE VISIT (OUTPATIENT)
Dept: INFECTIOUS DISEASES | Age: 53
End: 2023-05-03
Payer: COMMERCIAL

## 2023-05-03 VITALS
RESPIRATION RATE: 17 BRPM | TEMPERATURE: 98 F | HEART RATE: 87 BPM | HEIGHT: 72 IN | OXYGEN SATURATION: 92 % | SYSTOLIC BLOOD PRESSURE: 132 MMHG | WEIGHT: 242 LBS | BODY MASS INDEX: 32.78 KG/M2 | DIASTOLIC BLOOD PRESSURE: 72 MMHG

## 2023-05-03 DIAGNOSIS — M86.172 OSTEOMYELITIS OF FOOT, LEFT, ACUTE (HCC): Primary | ICD-10-CM

## 2023-05-03 DIAGNOSIS — M86.172 OSTEOMYELITIS OF FOOT, LEFT, ACUTE (HCC): ICD-10-CM

## 2023-05-03 LAB
ERYTHROCYTE [SEDIMENTATION RATE] IN BLOOD BY WESTERGREN METHOD: 36 MM/HR (ref 0–20)
HCT VFR BLD AUTO: 36.1 % (ref 40.7–50.3)
HGB BLD-MCNC: 11.8 G/DL (ref 13–17)
MCH RBC QN AUTO: 30.4 PG (ref 25.2–33.5)
MCHC RBC AUTO-ENTMCNC: 32.7 G/DL (ref 28.4–34.8)
MCV RBC AUTO: 93 FL (ref 82.6–102.9)
NRBC AUTOMATED: 0 PER 100 WBC
PDW BLD-RTO: 13 % (ref 11.8–14.4)
PLATELET # BLD AUTO: 333 K/UL (ref 138–453)
PMV BLD AUTO: 9.3 FL (ref 8.1–13.5)
RBC # BLD: 3.88 M/UL (ref 4.21–5.77)
WBC # BLD AUTO: 10.7 K/UL (ref 3.5–11.3)

## 2023-05-03 PROCEDURE — 99204 OFFICE O/P NEW MOD 45 MIN: CPT | Performed by: INTERNAL MEDICINE

## 2023-05-03 PROCEDURE — 3078F DIAST BP <80 MM HG: CPT | Performed by: INTERNAL MEDICINE

## 2023-05-03 PROCEDURE — 73630 X-RAY EXAM OF FOOT: CPT

## 2023-05-03 PROCEDURE — 3074F SYST BP LT 130 MM HG: CPT | Performed by: INTERNAL MEDICINE

## 2023-05-03 RX ORDER — LEVOFLOXACIN 750 MG/1
750 TABLET ORAL DAILY
Qty: 7 TABLET | Refills: 0 | Status: SHIPPED | OUTPATIENT
Start: 2023-05-03 | End: 2023-05-10

## 2023-05-04 LAB — CRP SERPL HS-MCNC: 32.5 MG/L (ref 0–5)
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
## 2023-05-06 LAB
MICROORGANISM SPEC CULT: ABNORMAL
MICROORGANISM/AGENT SPEC: ABNORMAL
SPECIMEN DESCRIPTION: ABNORMAL

## 2023-05-09 ENCOUNTER — HOSPITAL ENCOUNTER (OUTPATIENT)
Dept: MRI IMAGING | Facility: CLINIC | Age: 53
Discharge: HOME | End: 2023-05-11
Payer: COMMERCIAL

## 2023-05-09 DIAGNOSIS — M86.172 OSTEOMYELITIS OF FOOT, LEFT, ACUTE (HCC): ICD-10-CM

## 2023-05-09 PROCEDURE — 2580000003 HC RX 258: Performed by: SURGERY

## 2023-05-09 PROCEDURE — 73720 MRI LWR EXTREMITY W/O&W/DYE: CPT

## 2023-05-09 PROCEDURE — 6360000004 HC RX CONTRAST MEDICATION: Performed by: SURGERY

## 2023-05-09 PROCEDURE — A9579 GAD-BASE MR CONTRAST NOS,1ML: HCPCS | Performed by: SURGERY

## 2023-05-09 RX ORDER — SODIUM CHLORIDE 0.9 % (FLUSH) 0.9 %
10 SYRINGE (ML) INJECTION PRN
Status: DISCONTINUED | OUTPATIENT
Start: 2023-05-09 | End: 2023-05-12 | Stop reason: HOSPADM

## 2023-05-09 RX ADMIN — Medication 10 ML: at 15:17

## 2023-05-09 RX ADMIN — GADOTERIDOL 20 ML: 279.3 INJECTION, SOLUTION INTRAVENOUS at 15:16

## 2023-05-10 ENCOUNTER — OFFICE VISIT (OUTPATIENT)
Dept: INFECTIOUS DISEASES | Age: 53
End: 2023-05-10
Payer: COMMERCIAL

## 2023-05-10 ENCOUNTER — ANESTHESIA EVENT (OUTPATIENT)
Dept: OPERATING ROOM | Age: 53
End: 2023-05-10
Payer: COMMERCIAL

## 2023-05-10 ENCOUNTER — HOSPITAL ENCOUNTER (INPATIENT)
Age: 53
LOS: 7 days | Discharge: HOME | DRG: 617 | End: 2023-05-17
Attending: EMERGENCY MEDICINE | Admitting: FAMILY MEDICINE
Payer: COMMERCIAL

## 2023-05-10 ENCOUNTER — APPOINTMENT (OUTPATIENT)
Dept: CT IMAGING | Age: 53
DRG: 617 | End: 2023-05-10
Payer: COMMERCIAL

## 2023-05-10 VITALS
TEMPERATURE: 98.6 F | HEART RATE: 80 BPM | RESPIRATION RATE: 16 BRPM | BODY MASS INDEX: 30.07 KG/M2 | HEIGHT: 72 IN | WEIGHT: 222 LBS | DIASTOLIC BLOOD PRESSURE: 78 MMHG | OXYGEN SATURATION: 98 % | SYSTOLIC BLOOD PRESSURE: 132 MMHG

## 2023-05-10 DIAGNOSIS — M86.172 OSTEOMYELITIS OF FOOT, LEFT, ACUTE (HCC): Primary | ICD-10-CM

## 2023-05-10 DIAGNOSIS — L08.9 LEFT FOOT INFECTION: ICD-10-CM

## 2023-05-10 DIAGNOSIS — M86.9 OSTEOMYELITIS OF LEFT FOOT, UNSPECIFIED TYPE (HCC): Primary | ICD-10-CM

## 2023-05-10 DIAGNOSIS — G89.18 POST-OP PAIN: ICD-10-CM

## 2023-05-10 DIAGNOSIS — L02.612 FOOT ABSCESS, LEFT: ICD-10-CM

## 2023-05-10 PROBLEM — I73.9 PERIPHERAL VASCULAR DISEASE (HCC): Status: ACTIVE | Noted: 2023-05-10

## 2023-05-10 LAB
ABSOLUTE EOS #: 0.3 K/UL (ref 0–0.4)
ABSOLUTE LYMPH #: 2.1 K/UL (ref 1–4.8)
ABSOLUTE MONO #: 0.8 K/UL (ref 0.1–1.3)
ANION GAP SERPL CALCULATED.3IONS-SCNC: 10 MMOL/L (ref 9–17)
BASOPHILS # BLD: 1 % (ref 0–2)
BASOPHILS ABSOLUTE: 0.1 K/UL (ref 0–0.2)
BUN SERPL-MCNC: 13 MG/DL (ref 6–20)
CALCIUM SERPL-MCNC: 9 MG/DL (ref 8.6–10.4)
CHLORIDE SERPL-SCNC: 95 MMOL/L (ref 98–107)
CO2 SERPL-SCNC: 29 MMOL/L (ref 20–31)
CREAT SERPL-MCNC: 0.69 MG/DL (ref 0.7–1.2)
CRP SERPL HS-MCNC: 93.3 MG/L (ref 0–5)
EOSINOPHILS RELATIVE PERCENT: 3 % (ref 0–4)
ERYTHROCYTE [SEDIMENTATION RATE] IN BLOOD BY WESTERGREN METHOD: 58 MM/HR (ref 0–20)
GFR SERPL CREATININE-BSD FRML MDRD: >60 ML/MIN/1.73M2
GLUCOSE BLD-MCNC: 169 MG/DL (ref 75–110)
GLUCOSE BLD-MCNC: 391 MG/DL (ref 75–110)
GLUCOSE SERPL-MCNC: 161 MG/DL (ref 70–99)
HCT VFR BLD AUTO: 30.1 % (ref 41–53)
HGB BLD-MCNC: 10.9 G/DL (ref 13.5–17.5)
LYMPHOCYTES # BLD: 19 % (ref 24–44)
MCH RBC QN AUTO: 31.5 PG (ref 26–34)
MCHC RBC AUTO-ENTMCNC: 36.1 G/DL (ref 31–37)
MCV RBC AUTO: 87 FL (ref 80–100)
MONOCYTES # BLD: 8 % (ref 1–7)
PDW BLD-RTO: 13.9 % (ref 11.5–14.9)
PLATELET # BLD AUTO: 306 K/UL (ref 150–450)
PMV BLD AUTO: 6.8 FL (ref 6–12)
POTASSIUM SERPL-SCNC: 4.2 MMOL/L (ref 3.7–5.3)
RBC # BLD: 3.46 M/UL (ref 4.5–5.9)
SEG NEUTROPHILS: 69 % (ref 36–66)
SEGMENTED NEUTROPHILS ABSOLUTE COUNT: 7.6 K/UL (ref 1.3–9.1)
SODIUM SERPL-SCNC: 134 MMOL/L (ref 135–144)
WBC # BLD AUTO: 10.8 K/UL (ref 3.5–11)

## 2023-05-10 PROCEDURE — 3078F DIAST BP <80 MM HG: CPT | Performed by: INTERNAL MEDICINE

## 2023-05-10 PROCEDURE — 85652 RBC SED RATE AUTOMATED: CPT

## 2023-05-10 PROCEDURE — 6360000002 HC RX W HCPCS: Performed by: FAMILY MEDICINE

## 2023-05-10 PROCEDURE — 86140 C-REACTIVE PROTEIN: CPT

## 2023-05-10 PROCEDURE — 6360000004 HC RX CONTRAST MEDICATION: Performed by: STUDENT IN AN ORGANIZED HEALTH CARE EDUCATION/TRAINING PROGRAM

## 2023-05-10 PROCEDURE — 2580000003 HC RX 258: Performed by: FAMILY MEDICINE

## 2023-05-10 PROCEDURE — 99205 OFFICE O/P NEW HI 60 MIN: CPT | Performed by: INTERNAL MEDICINE

## 2023-05-10 PROCEDURE — 3075F SYST BP GE 130 - 139MM HG: CPT | Performed by: INTERNAL MEDICINE

## 2023-05-10 PROCEDURE — 6360000002 HC RX W HCPCS: Performed by: EMERGENCY MEDICINE

## 2023-05-10 PROCEDURE — 2500000003 HC RX 250 WO HCPCS: Performed by: EMERGENCY MEDICINE

## 2023-05-10 PROCEDURE — 80048 BASIC METABOLIC PNL TOTAL CA: CPT

## 2023-05-10 PROCEDURE — 2580000003 HC RX 258: Performed by: STUDENT IN AN ORGANIZED HEALTH CARE EDUCATION/TRAINING PROGRAM

## 2023-05-10 PROCEDURE — 75635 CT ANGIO ABDOMINAL ARTERIES: CPT

## 2023-05-10 PROCEDURE — 85025 COMPLETE CBC W/AUTO DIFF WBC: CPT

## 2023-05-10 PROCEDURE — 99223 1ST HOSP IP/OBS HIGH 75: CPT | Performed by: PODIATRIST

## 2023-05-10 PROCEDURE — 82947 ASSAY GLUCOSE BLOOD QUANT: CPT

## 2023-05-10 PROCEDURE — 87040 BLOOD CULTURE FOR BACTERIA: CPT

## 2023-05-10 PROCEDURE — 36415 COLL VENOUS BLD VENIPUNCTURE: CPT

## 2023-05-10 PROCEDURE — 99222 1ST HOSP IP/OBS MODERATE 55: CPT | Performed by: STUDENT IN AN ORGANIZED HEALTH CARE EDUCATION/TRAINING PROGRAM

## 2023-05-10 PROCEDURE — 2580000003 HC RX 258: Performed by: EMERGENCY MEDICINE

## 2023-05-10 PROCEDURE — 1200000000 HC SEMI PRIVATE

## 2023-05-10 PROCEDURE — 99285 EMERGENCY DEPT VISIT HI MDM: CPT

## 2023-05-10 PROCEDURE — 6370000000 HC RX 637 (ALT 250 FOR IP): Performed by: FAMILY MEDICINE

## 2023-05-10 RX ORDER — ACETAMINOPHEN 325 MG/1
650 TABLET ORAL EVERY 6 HOURS PRN
Status: DISCONTINUED | OUTPATIENT
Start: 2023-05-10 | End: 2023-05-17 | Stop reason: HOSPADM

## 2023-05-10 RX ORDER — SODIUM CHLORIDE 9 MG/ML
INJECTION, SOLUTION INTRAVENOUS CONTINUOUS
Status: DISCONTINUED | OUTPATIENT
Start: 2023-05-10 | End: 2023-05-17 | Stop reason: HOSPADM

## 2023-05-10 RX ORDER — ONDANSETRON 4 MG/1
4 TABLET, FILM COATED ORAL EVERY 8 HOURS PRN
Status: DISCONTINUED | OUTPATIENT
Start: 2023-05-10 | End: 2023-05-17 | Stop reason: HOSPADM

## 2023-05-10 RX ORDER — GABAPENTIN 300 MG/1
900 CAPSULE ORAL 3 TIMES DAILY
Status: DISCONTINUED | OUTPATIENT
Start: 2023-05-10 | End: 2023-05-16

## 2023-05-10 RX ORDER — SODIUM CHLORIDE 0.9 % (FLUSH) 0.9 %
10 SYRINGE (ML) INJECTION PRN
Status: DISCONTINUED | OUTPATIENT
Start: 2023-05-10 | End: 2023-05-17 | Stop reason: HOSPADM

## 2023-05-10 RX ORDER — ASPIRIN 81 MG/1
81 TABLET ORAL DAILY
Status: DISCONTINUED | OUTPATIENT
Start: 2023-05-11 | End: 2023-05-17 | Stop reason: HOSPADM

## 2023-05-10 RX ORDER — 0.9 % SODIUM CHLORIDE 0.9 %
100 INTRAVENOUS SOLUTION INTRAVENOUS ONCE
Status: COMPLETED | OUTPATIENT
Start: 2023-05-10 | End: 2023-05-10

## 2023-05-10 RX ORDER — METHADONE HYDROCHLORIDE 10 MG/5ML
110 SOLUTION ORAL DAILY
Status: DISCONTINUED | OUTPATIENT
Start: 2023-05-11 | End: 2023-05-17 | Stop reason: HOSPADM

## 2023-05-10 RX ORDER — ONDANSETRON 2 MG/ML
4 INJECTION INTRAMUSCULAR; INTRAVENOUS EVERY 6 HOURS PRN
Status: DISCONTINUED | OUTPATIENT
Start: 2023-05-10 | End: 2023-05-17 | Stop reason: HOSPADM

## 2023-05-10 RX ORDER — INSULIN LISPRO 100 [IU]/ML
0-16 INJECTION, SOLUTION INTRAVENOUS; SUBCUTANEOUS
Status: DISCONTINUED | OUTPATIENT
Start: 2023-05-10 | End: 2023-05-12

## 2023-05-10 RX ORDER — PANTOPRAZOLE SODIUM 40 MG/1
40 TABLET, DELAYED RELEASE ORAL
Status: DISCONTINUED | OUTPATIENT
Start: 2023-05-11 | End: 2023-05-17 | Stop reason: HOSPADM

## 2023-05-10 RX ORDER — ATORVASTATIN CALCIUM 80 MG/1
80 TABLET, FILM COATED ORAL NIGHTLY
Status: DISCONTINUED | OUTPATIENT
Start: 2023-05-10 | End: 2023-05-17 | Stop reason: HOSPADM

## 2023-05-10 RX ORDER — LISINOPRIL 20 MG/1
20 TABLET ORAL DAILY
Status: DISCONTINUED | OUTPATIENT
Start: 2023-05-11 | End: 2023-05-17 | Stop reason: HOSPADM

## 2023-05-10 RX ORDER — DEXTROSE MONOHYDRATE 100 MG/ML
INJECTION, SOLUTION INTRAVENOUS CONTINUOUS PRN
Status: DISCONTINUED | OUTPATIENT
Start: 2023-05-10 | End: 2023-05-17 | Stop reason: HOSPADM

## 2023-05-10 RX ORDER — INSULIN GLARGINE 100 [IU]/ML
80 INJECTION, SOLUTION SUBCUTANEOUS EVERY MORNING
Status: DISCONTINUED | OUTPATIENT
Start: 2023-05-11 | End: 2023-05-12

## 2023-05-10 RX ORDER — FUROSEMIDE 40 MG/1
40 TABLET ORAL DAILY
Status: DISCONTINUED | OUTPATIENT
Start: 2023-05-11 | End: 2023-05-17 | Stop reason: HOSPADM

## 2023-05-10 RX ORDER — INSULIN LISPRO 100 [IU]/ML
0-4 INJECTION, SOLUTION INTRAVENOUS; SUBCUTANEOUS NIGHTLY
Status: DISCONTINUED | OUTPATIENT
Start: 2023-05-10 | End: 2023-05-12

## 2023-05-10 RX ORDER — DULOXETIN HYDROCHLORIDE 30 MG/1
30 CAPSULE, DELAYED RELEASE ORAL DAILY
Status: DISCONTINUED | OUTPATIENT
Start: 2023-05-11 | End: 2023-05-17 | Stop reason: HOSPADM

## 2023-05-10 RX ADMIN — DOXYCYCLINE 100 MG: 100 INJECTION, POWDER, LYOPHILIZED, FOR SOLUTION INTRAVENOUS at 12:35

## 2023-05-10 RX ADMIN — PIPERACILLIN AND TAZOBACTAM 3375 MG: 3; .375 INJECTION, POWDER, LYOPHILIZED, FOR SOLUTION INTRAVENOUS at 19:47

## 2023-05-10 RX ADMIN — ATORVASTATIN CALCIUM 80 MG: 80 TABLET, FILM COATED ORAL at 22:26

## 2023-05-10 RX ADMIN — INSULIN LISPRO 4 UNITS: 100 INJECTION, SOLUTION INTRAVENOUS; SUBCUTANEOUS at 23:09

## 2023-05-10 RX ADMIN — GABAPENTIN 900 MG: 300 CAPSULE ORAL at 22:26

## 2023-05-10 RX ADMIN — SODIUM CHLORIDE: 9 INJECTION, SOLUTION INTRAVENOUS at 15:03

## 2023-05-10 RX ADMIN — SODIUM CHLORIDE 100 ML: 9 INJECTION, SOLUTION INTRAVENOUS at 12:54

## 2023-05-10 RX ADMIN — IOPAMIDOL 100 ML: 755 INJECTION, SOLUTION INTRAVENOUS at 12:54

## 2023-05-10 RX ADMIN — PIPERACILLIN AND TAZOBACTAM 4500 MG: 4; .5 INJECTION, POWDER, LYOPHILIZED, FOR SOLUTION INTRAVENOUS at 11:55

## 2023-05-10 RX ADMIN — GABAPENTIN 900 MG: 300 CAPSULE ORAL at 14:59

## 2023-05-10 RX ADMIN — SODIUM CHLORIDE, PRESERVATIVE FREE 10 ML: 5 INJECTION INTRAVENOUS at 12:54

## 2023-05-10 ASSESSMENT — ENCOUNTER SYMPTOMS
COLOR CHANGE: 1
DIARRHEA: 0
VOMITING: 0
RHINORRHEA: 0
BACK PAIN: 0
CONSTIPATION: 0
VOICE CHANGE: 0
EYE DISCHARGE: 0
CHEST TIGHTNESS: 0
STRIDOR: 0
ABDOMINAL PAIN: 0
EYE PAIN: 0
EYE REDNESS: 0
COLOR CHANGE: 0
WHEEZING: 0
ABDOMINAL DISTENTION: 0
SHORTNESS OF BREATH: 0
BLOOD IN STOOL: 0
FACIAL SWELLING: 0
SINUS PRESSURE: 0
COUGH: 0
TROUBLE SWALLOWING: 0
SORE THROAT: 0
SHORTNESS OF BREATH: 0
NAUSEA: 0

## 2023-05-10 ASSESSMENT — PAIN - FUNCTIONAL ASSESSMENT
PAIN_FUNCTIONAL_ASSESSMENT: NONE - DENIES PAIN
PAIN_FUNCTIONAL_ASSESSMENT: NONE - DENIES PAIN

## 2023-05-10 ASSESSMENT — LIFESTYLE VARIABLES: SMOKING_STATUS: 0

## 2023-05-10 NOTE — PROGRESS NOTES
atorvastatin (LIPITOR) 80 MG tablet Take 1 tablet by mouth nightly 30 tablet 3    methadone 10 MG/5ML solution Take 52.5 mLs by mouth daily. Insulin Pen Needle 32G X 4 MM MISC 1 each by Does not apply route daily 100 each 3    glucose blood VI test strips (ASCENSIA AUTODISC VI;ONE TOUCH ULTRA TEST VI) strip 1 each by In Vitro route 2 times daily As needed. Lancets MISC 1 each by Does not apply route 2 times daily      gabapentin (NEURONTIN) 300 MG capsule Take 3 capsules by mouth 3 times daily for 30 days. 270 capsule 3    sodium hypochlorite (DAKINS) 0.125 % SOLN external solution Apply topically daily 500 mL 0    furosemide (LASIX) 20 MG tablet Take 1 tablet by mouth daily      Insulin Glargine (TOUJEO SOLOSTAR SC) Inject 80 Units into the skin daily       Current Facility-Administered Medications on File Prior to Visit   Medication Dose Route Frequency Provider Last Rate Last Admin    sodium chloride flush 0.9 % injection 10 mL  10 mL IntraVENous PRN José Luis Steen MD   10 mL at 23 1517           Allergies  No Known Allergies     Social   Social History     Tobacco Use    Smoking status: Every Day     Packs/day: 1.50     Years: 25.00     Pack years: 37.50     Types: Cigarettes     Last attempt to quit: 2022     Years since quittin.5    Smokeless tobacco: Never   Substance Use Topics    Alcohol use: Not Currently               No family history on file. Review of Systems  Other than above 12systems reviewed were negative . Physical Exam  /78   Pulse 80   Temp 98.6 °F (37 °C)   Resp 16   Ht 6' (1.829 m)   Wt 222 lb (100.7 kg)   SpO2 98%   BMI 30.11 kg/m²           General Appearance: alert and oriented to person, place and time, well-developed and well-nourished, in no acute distress  Skin: warm and dry, no rash   Head: normocephalic and atraumatic  Eyes: pupils equal, round,  conjunctivae normal  ENT: hearing grossly normal bilaterally.   Neck: neck

## 2023-05-10 NOTE — ANESTHESIA PRE PROCEDURE
Units SubCUTAneous TID WC Sarika T David, DO        insulin lispro (HUMALOG) injection vial 0-4 Units  0-4 Units SubCUTAneous Nightly Sarika T David, DO        glucose chewable tablet 16 g  4 tablet Oral PRN Sarika T David, DO        dextrose bolus 10% 125 mL  125 mL IntraVENous PRN Sarika T David, DO        Or    dextrose bolus 10% 250 mL  250 mL IntraVENous PRN Sarika T David, DO        glucagon injection 1 mg  1 mg SubCUTAneous PRN Sarika T Daivd, DO        dextrose 10 % infusion   IntraVENous Continuous PRN Sarika T David, DO        acetaminophen (TYLENOL) tablet 650 mg  650 mg Oral Q6H PRN Sarika T David, DO        ondansetron (ZOFRAN) tablet 4 mg  4 mg Oral Q8H PRN Sarika T David, DO        ondansetron (ZOFRAN) injection 4 mg  4 mg IntraVENous Q6H PRN Sarika T David, DO        piperacillin-tazobactam (ZOSYN) 3,375 mg in sodium chloride 0.9 % 50 mL IVPB (mini-bag)  3,375 mg IntraVENous Q8H Sarika T David, DO        doxycycline (VIBRAMYCIN) 100 mg in sodium chloride 0.9 % 100 mL IVPB (mini-bag)  100 mg IntraVENous Q12H Sarika T David, DO         Facility-Administered Medications Ordered in Other Encounters   Medication Dose Route Frequency Provider Last Rate Last Admin    sodium chloride flush 0.9 % injection 10 mL  10 mL IntraVENous PRN José Luis Steen MD   10 mL at 05/09/23 2327       Allergies:  No Known Allergies    Problem List:    Patient Active Problem List   Diagnosis Code    Type 2 diabetes mellitus, with long-term current use of insulin ([facility]) E11.9, Z79.4    Smoker F17.200    Furuncle of neck L02.12    Sebaceous cyst L72.3    Opioid dependence with withdrawal ([facility]) F11.23    Anxiety and depression F41.9, F32. A    Facial droop R29.810    Essential hypertension I10    Dependence on nicotine from cigarettes F17.210    Bell's palsy G51.0    Critical limb ischemia of left lower extremity with rest pain ([facility]) I70.222    Swelling of both lower extremities M79.89    Popliteal artery

## 2023-05-11 ENCOUNTER — APPOINTMENT (OUTPATIENT)
Dept: GENERAL RADIOLOGY | Age: 53
DRG: 617 | End: 2023-05-11
Payer: COMMERCIAL

## 2023-05-11 ENCOUNTER — ANESTHESIA (OUTPATIENT)
Dept: OPERATING ROOM | Age: 53
End: 2023-05-11
Payer: COMMERCIAL

## 2023-05-11 LAB
ABSOLUTE EOS #: 0.2 K/UL (ref 0–0.4)
ABSOLUTE LYMPH #: 1.6 K/UL (ref 1–4.8)
ABSOLUTE MONO #: 0.8 K/UL (ref 0.1–1.3)
ANION GAP SERPL CALCULATED.3IONS-SCNC: 7 MMOL/L (ref 9–17)
BASOPHILS # BLD: 1 % (ref 0–2)
BASOPHILS ABSOLUTE: 0 K/UL (ref 0–0.2)
BUN SERPL-MCNC: 13 MG/DL (ref 6–20)
CALCIUM SERPL-MCNC: 8.8 MG/DL (ref 8.6–10.4)
CHLORIDE SERPL-SCNC: 99 MMOL/L (ref 98–107)
CO2 SERPL-SCNC: 31 MMOL/L (ref 20–31)
CREAT SERPL-MCNC: 0.81 MG/DL (ref 0.7–1.2)
EOSINOPHILS RELATIVE PERCENT: 3 % (ref 0–4)
ERYTHROCYTE [SEDIMENTATION RATE] IN BLOOD BY WESTERGREN METHOD: 49 MM/HR (ref 0–20)
GFR SERPL CREATININE-BSD FRML MDRD: >60 ML/MIN/1.73M2
GLUCOSE BLD-MCNC: 110 MG/DL (ref 75–110)
GLUCOSE BLD-MCNC: 127 MG/DL (ref 75–110)
GLUCOSE BLD-MCNC: 145 MG/DL (ref 75–110)
GLUCOSE BLD-MCNC: 217 MG/DL (ref 75–110)
GLUCOSE BLD-MCNC: 231 MG/DL (ref 75–110)
GLUCOSE BLD-MCNC: 248 MG/DL (ref 75–110)
GLUCOSE SERPL-MCNC: 228 MG/DL (ref 70–99)
HCT VFR BLD AUTO: 30 % (ref 41–53)
HGB BLD-MCNC: 10.3 G/DL (ref 13.5–17.5)
LYMPHOCYTES # BLD: 22 % (ref 24–44)
MCH RBC QN AUTO: 30.4 PG (ref 26–34)
MCHC RBC AUTO-ENTMCNC: 34.4 G/DL (ref 31–37)
MCV RBC AUTO: 88.3 FL (ref 80–100)
MONOCYTES # BLD: 11 % (ref 1–7)
PDW BLD-RTO: 13.8 % (ref 11.5–14.9)
PLATELET # BLD AUTO: 290 K/UL (ref 150–450)
PMV BLD AUTO: 6.5 FL (ref 6–12)
POTASSIUM SERPL-SCNC: 4.4 MMOL/L (ref 3.7–5.3)
RBC # BLD: 3.4 M/UL (ref 4.5–5.9)
SEG NEUTROPHILS: 63 % (ref 36–66)
SEGMENTED NEUTROPHILS ABSOLUTE COUNT: 4.8 K/UL (ref 1.3–9.1)
SODIUM SERPL-SCNC: 137 MMOL/L (ref 135–144)
WBC # BLD AUTO: 7.4 K/UL (ref 3.5–11)

## 2023-05-11 PROCEDURE — 99254 IP/OBS CNSLTJ NEW/EST MOD 60: CPT | Performed by: INTERNAL MEDICINE

## 2023-05-11 PROCEDURE — 85652 RBC SED RATE AUTOMATED: CPT

## 2023-05-11 PROCEDURE — 2709999900 HC NON-CHARGEABLE SUPPLY: Performed by: PODIATRIST

## 2023-05-11 PROCEDURE — 82947 ASSAY GLUCOSE BLOOD QUANT: CPT

## 2023-05-11 PROCEDURE — 6370000000 HC RX 637 (ALT 250 FOR IP): Performed by: FAMILY MEDICINE

## 2023-05-11 PROCEDURE — 2580000003 HC RX 258: Performed by: FAMILY MEDICINE

## 2023-05-11 PROCEDURE — 3700000001 HC ADD 15 MINUTES (ANESTHESIA): Performed by: PODIATRIST

## 2023-05-11 PROCEDURE — 87176 TISSUE HOMOGENIZATION CULTR: CPT

## 2023-05-11 PROCEDURE — 87077 CULTURE AEROBIC IDENTIFY: CPT

## 2023-05-11 PROCEDURE — 6360000002 HC RX W HCPCS: Performed by: NURSE ANESTHETIST, CERTIFIED REGISTERED

## 2023-05-11 PROCEDURE — 3600000012 HC SURGERY LEVEL 2 ADDTL 15MIN: Performed by: PODIATRIST

## 2023-05-11 PROCEDURE — 87205 SMEAR GRAM STAIN: CPT

## 2023-05-11 PROCEDURE — 1200000000 HC SEMI PRIVATE

## 2023-05-11 PROCEDURE — 88311 DECALCIFY TISSUE: CPT

## 2023-05-11 PROCEDURE — 2580000003 HC RX 258

## 2023-05-11 PROCEDURE — 3600000002 HC SURGERY LEVEL 2 BASE: Performed by: PODIATRIST

## 2023-05-11 PROCEDURE — 0QBP0ZX EXCISION OF LEFT METATARSAL, OPEN APPROACH, DIAGNOSTIC: ICD-10-PCS

## 2023-05-11 PROCEDURE — 80048 BASIC METABOLIC PNL TOTAL CA: CPT

## 2023-05-11 PROCEDURE — 7100000000 HC PACU RECOVERY - FIRST 15 MIN: Performed by: PODIATRIST

## 2023-05-11 PROCEDURE — 87075 CULTR BACTERIA EXCEPT BLOOD: CPT

## 2023-05-11 PROCEDURE — 6370000000 HC RX 637 (ALT 250 FOR IP)

## 2023-05-11 PROCEDURE — 6360000002 HC RX W HCPCS: Performed by: FAMILY MEDICINE

## 2023-05-11 PROCEDURE — 7100000001 HC PACU RECOVERY - ADDTL 15 MIN: Performed by: PODIATRIST

## 2023-05-11 PROCEDURE — 0Y6N0ZF DETACHMENT AT LEFT FOOT, PARTIAL 5TH RAY, OPEN APPROACH: ICD-10-PCS

## 2023-05-11 PROCEDURE — 2500000003 HC RX 250 WO HCPCS: Performed by: NURSE ANESTHETIST, CERTIFIED REGISTERED

## 2023-05-11 PROCEDURE — 73630 X-RAY EXAM OF FOOT: CPT

## 2023-05-11 PROCEDURE — 88305 TISSUE EXAM BY PATHOLOGIST: CPT

## 2023-05-11 PROCEDURE — 6360000002 HC RX W HCPCS

## 2023-05-11 PROCEDURE — 2500000003 HC RX 250 WO HCPCS: Performed by: PODIATRIST

## 2023-05-11 PROCEDURE — 3700000000 HC ANESTHESIA ATTENDED CARE: Performed by: PODIATRIST

## 2023-05-11 PROCEDURE — 2500000003 HC RX 250 WO HCPCS: Performed by: FAMILY MEDICINE

## 2023-05-11 PROCEDURE — 20240 BONE BIOPSY OPEN SUPERFICIAL: CPT | Performed by: PODIATRIST

## 2023-05-11 PROCEDURE — 87070 CULTURE OTHR SPECIMN AEROBIC: CPT

## 2023-05-11 PROCEDURE — 2580000003 HC RX 258: Performed by: NURSE ANESTHETIST, CERTIFIED REGISTERED

## 2023-05-11 PROCEDURE — 85025 COMPLETE CBC W/AUTO DIFF WBC: CPT

## 2023-05-11 PROCEDURE — 88304 TISSUE EXAM BY PATHOLOGIST: CPT

## 2023-05-11 PROCEDURE — 36415 COLL VENOUS BLD VENIPUNCTURE: CPT

## 2023-05-11 PROCEDURE — 28810 AMPUTATION TOE & METATARSAL: CPT | Performed by: PODIATRIST

## 2023-05-11 RX ORDER — METOCLOPRAMIDE HYDROCHLORIDE 5 MG/ML
10 INJECTION INTRAMUSCULAR; INTRAVENOUS
Status: DISCONTINUED | OUTPATIENT
Start: 2023-05-11 | End: 2023-05-11 | Stop reason: HOSPADM

## 2023-05-11 RX ORDER — DIPHENHYDRAMINE HYDROCHLORIDE 50 MG/ML
12.5 INJECTION INTRAMUSCULAR; INTRAVENOUS
Status: DISCONTINUED | OUTPATIENT
Start: 2023-05-11 | End: 2023-05-11 | Stop reason: HOSPADM

## 2023-05-11 RX ORDER — OXYCODONE HYDROCHLORIDE 10 MG/1
10 TABLET ORAL EVERY 4 HOURS PRN
Status: DISCONTINUED | OUTPATIENT
Start: 2023-05-11 | End: 2023-05-12

## 2023-05-11 RX ORDER — BUPIVACAINE HYDROCHLORIDE 5 MG/ML
INJECTION, SOLUTION EPIDURAL; INTRACAUDAL PRN
Status: DISCONTINUED | OUTPATIENT
Start: 2023-05-11 | End: 2023-05-11 | Stop reason: ALTCHOICE

## 2023-05-11 RX ORDER — MIDAZOLAM HYDROCHLORIDE 1 MG/ML
INJECTION INTRAMUSCULAR; INTRAVENOUS PRN
Status: DISCONTINUED | OUTPATIENT
Start: 2023-05-11 | End: 2023-05-11 | Stop reason: SDUPTHER

## 2023-05-11 RX ORDER — PROPOFOL 10 MG/ML
INJECTION, EMULSION INTRAVENOUS PRN
Status: DISCONTINUED | OUTPATIENT
Start: 2023-05-11 | End: 2023-05-11 | Stop reason: SDUPTHER

## 2023-05-11 RX ORDER — PROPOFOL 10 MG/ML
INJECTION, EMULSION INTRAVENOUS CONTINUOUS PRN
Status: DISCONTINUED | OUTPATIENT
Start: 2023-05-11 | End: 2023-05-11 | Stop reason: SDUPTHER

## 2023-05-11 RX ORDER — SODIUM CHLORIDE 0.9 % (FLUSH) 0.9 %
5-40 SYRINGE (ML) INJECTION PRN
Status: DISCONTINUED | OUTPATIENT
Start: 2023-05-11 | End: 2023-05-11 | Stop reason: HOSPADM

## 2023-05-11 RX ORDER — FENTANYL CITRATE 0.05 MG/ML
25 INJECTION, SOLUTION INTRAMUSCULAR; INTRAVENOUS EVERY 5 MIN PRN
Status: DISCONTINUED | OUTPATIENT
Start: 2023-05-11 | End: 2023-05-11 | Stop reason: HOSPADM

## 2023-05-11 RX ORDER — LIDOCAINE HYDROCHLORIDE 10 MG/ML
INJECTION, SOLUTION INFILTRATION; PERINEURAL PRN
Status: DISCONTINUED | OUTPATIENT
Start: 2023-05-11 | End: 2023-05-11 | Stop reason: ALTCHOICE

## 2023-05-11 RX ORDER — TIZANIDINE 4 MG/1
4 TABLET ORAL EVERY 6 HOURS PRN
Status: DISCONTINUED | OUTPATIENT
Start: 2023-05-11 | End: 2023-05-17 | Stop reason: HOSPADM

## 2023-05-11 RX ORDER — LIDOCAINE HYDROCHLORIDE 20 MG/ML
INJECTION, SOLUTION EPIDURAL; INFILTRATION; INTRACAUDAL; PERINEURAL PRN
Status: DISCONTINUED | OUTPATIENT
Start: 2023-05-11 | End: 2023-05-11 | Stop reason: SDUPTHER

## 2023-05-11 RX ORDER — KETOROLAC TROMETHAMINE 30 MG/ML
30 INJECTION, SOLUTION INTRAMUSCULAR; INTRAVENOUS EVERY 6 HOURS PRN
Status: DISPENSED | OUTPATIENT
Start: 2023-05-11 | End: 2023-05-16

## 2023-05-11 RX ORDER — FENTANYL CITRATE 50 UG/ML
INJECTION, SOLUTION INTRAMUSCULAR; INTRAVENOUS PRN
Status: DISCONTINUED | OUTPATIENT
Start: 2023-05-11 | End: 2023-05-11 | Stop reason: SDUPTHER

## 2023-05-11 RX ORDER — SODIUM CHLORIDE 9 MG/ML
INJECTION, SOLUTION INTRAVENOUS PRN
Status: DISCONTINUED | OUTPATIENT
Start: 2023-05-11 | End: 2023-05-11 | Stop reason: HOSPADM

## 2023-05-11 RX ORDER — OXYCODONE HYDROCHLORIDE 5 MG/1
5 TABLET ORAL EVERY 4 HOURS PRN
Status: DISCONTINUED | OUTPATIENT
Start: 2023-05-11 | End: 2023-05-12

## 2023-05-11 RX ORDER — SODIUM CHLORIDE 9 MG/ML
INJECTION, SOLUTION INTRAVENOUS CONTINUOUS PRN
Status: DISCONTINUED | OUTPATIENT
Start: 2023-05-11 | End: 2023-05-11 | Stop reason: SDUPTHER

## 2023-05-11 RX ORDER — SODIUM CHLORIDE 0.9 % (FLUSH) 0.9 %
5-40 SYRINGE (ML) INJECTION EVERY 12 HOURS SCHEDULED
Status: DISCONTINUED | OUTPATIENT
Start: 2023-05-11 | End: 2023-05-11 | Stop reason: HOSPADM

## 2023-05-11 RX ORDER — ONDANSETRON 2 MG/ML
4 INJECTION INTRAMUSCULAR; INTRAVENOUS
Status: DISCONTINUED | OUTPATIENT
Start: 2023-05-11 | End: 2023-05-11 | Stop reason: HOSPADM

## 2023-05-11 RX ADMIN — DOXYCYCLINE 100 MG: 100 INJECTION, POWDER, LYOPHILIZED, FOR SOLUTION INTRAVENOUS at 13:31

## 2023-05-11 RX ADMIN — PROPOFOL 40 MG: 10 INJECTION, EMULSION INTRAVENOUS at 16:07

## 2023-05-11 RX ADMIN — FUROSEMIDE 40 MG: 40 TABLET ORAL at 09:03

## 2023-05-11 RX ADMIN — PIPERACILLIN AND TAZOBACTAM 3375 MG: 3; .375 INJECTION, POWDER, LYOPHILIZED, FOR SOLUTION INTRAVENOUS at 11:44

## 2023-05-11 RX ADMIN — METHADONE HYDROCHLORIDE 110 MG: 10 SOLUTION ORAL at 09:42

## 2023-05-11 RX ADMIN — FENTANYL CITRATE 50 MCG: 50 INJECTION, SOLUTION INTRAMUSCULAR; INTRAVENOUS at 16:05

## 2023-05-11 RX ADMIN — INSULIN LISPRO 4 UNITS: 100 INJECTION, SOLUTION INTRAVENOUS; SUBCUTANEOUS at 09:04

## 2023-05-11 RX ADMIN — GABAPENTIN 900 MG: 300 CAPSULE ORAL at 20:39

## 2023-05-11 RX ADMIN — PIPERACILLIN AND TAZOBACTAM 3375 MG: 3; .375 INJECTION, POWDER, LYOPHILIZED, FOR SOLUTION INTRAVENOUS at 04:09

## 2023-05-11 RX ADMIN — GABAPENTIN 900 MG: 300 CAPSULE ORAL at 09:03

## 2023-05-11 RX ADMIN — SODIUM CHLORIDE: 9 INJECTION, SOLUTION INTRAVENOUS at 15:45

## 2023-05-11 RX ADMIN — PROPOFOL 80 MG: 10 INJECTION, EMULSION INTRAVENOUS at 16:05

## 2023-05-11 RX ADMIN — OXYCODONE HYDROCHLORIDE 10 MG: 10 TABLET ORAL at 22:17

## 2023-05-11 RX ADMIN — TIZANIDINE 4 MG: 4 TABLET ORAL at 21:42

## 2023-05-11 RX ADMIN — DOXYCYCLINE 100 MG: 100 INJECTION, POWDER, LYOPHILIZED, FOR SOLUTION INTRAVENOUS at 01:31

## 2023-05-11 RX ADMIN — LIDOCAINE HYDROCHLORIDE 50 MG: 20 INJECTION, SOLUTION EPIDURAL; INFILTRATION; INTRACAUDAL; PERINEURAL at 16:05

## 2023-05-11 RX ADMIN — MIDAZOLAM 2 MG: 1 INJECTION INTRAMUSCULAR; INTRAVENOUS at 16:02

## 2023-05-11 RX ADMIN — LISINOPRIL 20 MG: 20 TABLET ORAL at 09:03

## 2023-05-11 RX ADMIN — PIPERACILLIN AND TAZOBACTAM 3375 MG: 3; .375 INJECTION, POWDER, LYOPHILIZED, FOR SOLUTION INTRAVENOUS at 18:09

## 2023-05-11 RX ADMIN — ACETAMINOPHEN 650 MG: 325 TABLET ORAL at 18:13

## 2023-05-11 RX ADMIN — FENTANYL CITRATE 25 MCG: 50 INJECTION, SOLUTION INTRAMUSCULAR; INTRAVENOUS at 16:17

## 2023-05-11 RX ADMIN — RIVAROXABAN 20 MG: 20 TABLET, FILM COATED ORAL at 17:30

## 2023-05-11 RX ADMIN — KETOROLAC TROMETHAMINE 30 MG: 30 INJECTION, SOLUTION INTRAMUSCULAR; INTRAVENOUS at 20:39

## 2023-05-11 RX ADMIN — GABAPENTIN 900 MG: 300 CAPSULE ORAL at 13:44

## 2023-05-11 RX ADMIN — DULOXETINE HYDROCHLORIDE 30 MG: 30 CAPSULE, DELAYED RELEASE ORAL at 09:03

## 2023-05-11 RX ADMIN — RIVAROXABAN 20 MG: 20 TABLET, FILM COATED ORAL at 18:06

## 2023-05-11 RX ADMIN — ATORVASTATIN CALCIUM 80 MG: 80 TABLET, FILM COATED ORAL at 20:39

## 2023-05-11 RX ADMIN — PROPOFOL 100 MCG/KG/MIN: 10 INJECTION, EMULSION INTRAVENOUS at 16:05

## 2023-05-11 RX ADMIN — INSULIN GLARGINE 80 UNITS: 100 INJECTION, SOLUTION SUBCUTANEOUS at 09:03

## 2023-05-11 RX ADMIN — ASPIRIN 81 MG: 81 TABLET, COATED ORAL at 09:03

## 2023-05-11 RX ADMIN — PANTOPRAZOLE SODIUM 40 MG: 40 TABLET, DELAYED RELEASE ORAL at 06:07

## 2023-05-11 RX ADMIN — FENTANYL CITRATE 25 MCG: 50 INJECTION, SOLUTION INTRAMUSCULAR; INTRAVENOUS at 16:15

## 2023-05-11 ASSESSMENT — PAIN DESCRIPTION - LOCATION
LOCATION: FOOT

## 2023-05-11 ASSESSMENT — PAIN DESCRIPTION - DESCRIPTORS
DESCRIPTORS: ACHING
DESCRIPTORS: BURNING
DESCRIPTORS: BURNING;DISCOMFORT

## 2023-05-11 ASSESSMENT — PAIN DESCRIPTION - FREQUENCY: FREQUENCY: CONTINUOUS

## 2023-05-11 ASSESSMENT — PAIN DESCRIPTION - ORIENTATION
ORIENTATION: LEFT

## 2023-05-11 ASSESSMENT — PAIN SCALES - GENERAL
PAINLEVEL_OUTOF10: 8
PAINLEVEL_OUTOF10: 9
PAINLEVEL_OUTOF10: 3
PAINLEVEL_OUTOF10: 9
PAINLEVEL_OUTOF10: 8
PAINLEVEL_OUTOF10: 7
PAINLEVEL_OUTOF10: 10
PAINLEVEL_OUTOF10: 7

## 2023-05-11 ASSESSMENT — PAIN DESCRIPTION - PAIN TYPE
TYPE: SURGICAL PAIN

## 2023-05-11 ASSESSMENT — PAIN DESCRIPTION - ONSET
ONSET: ON-GOING
ONSET: ON-GOING

## 2023-05-11 ASSESSMENT — ENCOUNTER SYMPTOMS: COLOR CHANGE: 1

## 2023-05-11 ASSESSMENT — PAIN - FUNCTIONAL ASSESSMENT: PAIN_FUNCTIONAL_ASSESSMENT: 0-10

## 2023-05-11 NOTE — ANESTHESIA POSTPROCEDURE EVALUATION
POST- ANESTHESIA EVALUATION       Pt Name: Trina Ortega  MRN: [id number]  YOB: 1970  Date of evaluation: 5/11/2023  Time:  5:20 PM      /65   Pulse 52   Temp 97.1 °F (36.2 °C) (Infrared)   Resp 12   Ht 6' (1.829 m)   Wt 222 lb (100.7 kg)   SpO2 98%   BMI 30.11 kg/m²      Consciousness Level  Awake  Cardiopulmonary Status  Stable  Pain Adequately Treated YES  Nausea / Vomiting  NO  Adequate Hydration  YES  Anesthesia Related Complications NONE      Electronically signed by Yael Hebert MD on 5/11/2023 at 5:20 PM       Department of Anesthesiology  Postprocedure Note    Patient: Trina Ortega  MRN: [id number]  YOB: 1970  Date of evaluation: 5/11/2023      Procedure Summary     Date: 05/11/23 Room / Location: [address] [place] [address] Adeline Iqbal 03 / [facility]: [facility]    Anesthesia Start: 1600 Anesthesia Stop: 7477    Procedure: FOOT DEBRIDEMENT INCISION AND DRAINAGE (Left: Foot) Diagnosis:       Left foot infection      (Left foot infection [L08.9])      ( RM  8985)    Surgeons: Dejah Reardon DPM Responsible Provider: Yael Hebert MD    Anesthesia Type: general, MAC, TIVA ASA Status: 3          Anesthesia Type: No value filed.     Silvano Phase I: Silvano Score: 7    Silvano Phase II:        Anesthesia Post Evaluation

## 2023-05-12 LAB
ABSOLUTE EOS #: 0.2 K/UL (ref 0–0.4)
ABSOLUTE LYMPH #: 1.7 K/UL (ref 1–4.8)
ABSOLUTE MONO #: 0.6 K/UL (ref 0.1–1.3)
ANION GAP SERPL CALCULATED.3IONS-SCNC: 10 MMOL/L (ref 9–17)
BASOPHILS # BLD: 0 % (ref 0–2)
BASOPHILS ABSOLUTE: 0 K/UL (ref 0–0.2)
BUN SERPL-MCNC: 12 MG/DL (ref 6–20)
CALCIUM SERPL-MCNC: 8.4 MG/DL (ref 8.6–10.4)
CHLORIDE SERPL-SCNC: 103 MMOL/L (ref 98–107)
CO2 SERPL-SCNC: 27 MMOL/L (ref 20–31)
CREAT SERPL-MCNC: 0.74 MG/DL (ref 0.7–1.2)
EOSINOPHILS RELATIVE PERCENT: 2 % (ref 0–4)
GFR SERPL CREATININE-BSD FRML MDRD: >60 ML/MIN/1.73M2
GLUCOSE BLD-MCNC: 197 MG/DL (ref 75–110)
GLUCOSE BLD-MCNC: 229 MG/DL (ref 75–110)
GLUCOSE BLD-MCNC: 248 MG/DL (ref 75–110)
GLUCOSE BLD-MCNC: 258 MG/DL (ref 75–110)
GLUCOSE SERPL-MCNC: 210 MG/DL (ref 70–99)
HCT VFR BLD AUTO: 30.8 % (ref 41–53)
HGB BLD-MCNC: 10.7 G/DL (ref 13.5–17.5)
LYMPHOCYTES # BLD: 21 % (ref 24–44)
MCH RBC QN AUTO: 30.3 PG (ref 26–34)
MCHC RBC AUTO-ENTMCNC: 34.7 G/DL (ref 31–37)
MCV RBC AUTO: 87.3 FL (ref 80–100)
MONOCYTES # BLD: 7 % (ref 1–7)
PDW BLD-RTO: 14 % (ref 11.5–14.9)
PLATELET # BLD AUTO: 288 K/UL (ref 150–450)
PMV BLD AUTO: 6.4 FL (ref 6–12)
POTASSIUM SERPL-SCNC: 4.3 MMOL/L (ref 3.7–5.3)
RBC # BLD: 3.53 M/UL (ref 4.5–5.9)
SEG NEUTROPHILS: 70 % (ref 36–66)
SEGMENTED NEUTROPHILS ABSOLUTE COUNT: 5.7 K/UL (ref 1.3–9.1)
SODIUM SERPL-SCNC: 140 MMOL/L (ref 135–144)
WBC # BLD AUTO: 8.2 K/UL (ref 3.5–11)

## 2023-05-12 PROCEDURE — 2580000003 HC RX 258

## 2023-05-12 PROCEDURE — 2500000003 HC RX 250 WO HCPCS

## 2023-05-12 PROCEDURE — 85025 COMPLETE CBC W/AUTO DIFF WBC: CPT

## 2023-05-12 PROCEDURE — 1200000000 HC SEMI PRIVATE

## 2023-05-12 PROCEDURE — 80048 BASIC METABOLIC PNL TOTAL CA: CPT

## 2023-05-12 PROCEDURE — 6360000002 HC RX W HCPCS

## 2023-05-12 PROCEDURE — 6370000000 HC RX 637 (ALT 250 FOR IP): Performed by: FAMILY MEDICINE

## 2023-05-12 PROCEDURE — 82947 ASSAY GLUCOSE BLOOD QUANT: CPT

## 2023-05-12 PROCEDURE — 36415 COLL VENOUS BLD VENIPUNCTURE: CPT

## 2023-05-12 PROCEDURE — 6370000000 HC RX 637 (ALT 250 FOR IP)

## 2023-05-12 PROCEDURE — 99232 SBSQ HOSP IP/OBS MODERATE 35: CPT | Performed by: INTERNAL MEDICINE

## 2023-05-12 RX ORDER — OXYCODONE HYDROCHLORIDE 10 MG/1
10 TABLET ORAL EVERY 4 HOURS PRN
Status: DISCONTINUED | OUTPATIENT
Start: 2023-05-12 | End: 2023-05-12

## 2023-05-12 RX ORDER — INSULIN GLARGINE 100 [IU]/ML
50 INJECTION, SOLUTION SUBCUTANEOUS 2 TIMES DAILY
Status: DISCONTINUED | OUTPATIENT
Start: 2023-05-12 | End: 2023-05-13

## 2023-05-12 RX ORDER — MORPHINE SULFATE 4 MG/ML
4 INJECTION, SOLUTION INTRAMUSCULAR; INTRAVENOUS
Status: DISCONTINUED | OUTPATIENT
Start: 2023-05-12 | End: 2023-05-12

## 2023-05-12 RX ORDER — OXYCODONE HYDROCHLORIDE 10 MG/1
10 TABLET ORAL EVERY 4 HOURS PRN
Status: DISCONTINUED | OUTPATIENT
Start: 2023-05-12 | End: 2023-05-17 | Stop reason: HOSPADM

## 2023-05-12 RX ORDER — INSULIN LISPRO 100 [IU]/ML
5 INJECTION, SOLUTION INTRAVENOUS; SUBCUTANEOUS
Status: DISCONTINUED | OUTPATIENT
Start: 2023-05-12 | End: 2023-05-17 | Stop reason: HOSPADM

## 2023-05-12 RX ORDER — MORPHINE SULFATE 2 MG/ML
2 INJECTION, SOLUTION INTRAMUSCULAR; INTRAVENOUS
Status: DISCONTINUED | OUTPATIENT
Start: 2023-05-12 | End: 2023-05-12

## 2023-05-12 RX ORDER — OXYCODONE HYDROCHLORIDE 10 MG/1
10 TABLET ORAL EVERY 6 HOURS PRN
Status: DISCONTINUED | OUTPATIENT
Start: 2023-05-12 | End: 2023-05-17 | Stop reason: HOSPADM

## 2023-05-12 RX ADMIN — PANTOPRAZOLE SODIUM 40 MG: 40 TABLET, DELAYED RELEASE ORAL at 05:09

## 2023-05-12 RX ADMIN — PIPERACILLIN AND TAZOBACTAM 3375 MG: 3; .375 INJECTION, POWDER, LYOPHILIZED, FOR SOLUTION INTRAVENOUS at 03:10

## 2023-05-12 RX ADMIN — KETOROLAC TROMETHAMINE 30 MG: 30 INJECTION, SOLUTION INTRAMUSCULAR; INTRAVENOUS at 05:13

## 2023-05-12 RX ADMIN — DULOXETINE HYDROCHLORIDE 30 MG: 30 CAPSULE, DELAYED RELEASE ORAL at 07:43

## 2023-05-12 RX ADMIN — METHADONE HYDROCHLORIDE 110 MG: 10 SOLUTION ORAL at 08:52

## 2023-05-12 RX ADMIN — MORPHINE SULFATE 4 MG: 4 INJECTION, SOLUTION INTRAMUSCULAR; INTRAVENOUS at 00:58

## 2023-05-12 RX ADMIN — INSULIN GLARGINE 50 UNITS: 100 INJECTION, SOLUTION SUBCUTANEOUS at 19:59

## 2023-05-12 RX ADMIN — ASPIRIN 81 MG: 81 TABLET, COATED ORAL at 07:43

## 2023-05-12 RX ADMIN — GABAPENTIN 900 MG: 300 CAPSULE ORAL at 07:43

## 2023-05-12 RX ADMIN — INSULIN LISPRO 8 UNITS: 100 INJECTION, SOLUTION INTRAVENOUS; SUBCUTANEOUS at 16:58

## 2023-05-12 RX ADMIN — PIPERACILLIN AND TAZOBACTAM 3375 MG: 3; .375 INJECTION, POWDER, LYOPHILIZED, FOR SOLUTION INTRAVENOUS at 19:46

## 2023-05-12 RX ADMIN — RIVAROXABAN 20 MG: 20 TABLET, FILM COATED ORAL at 16:58

## 2023-05-12 RX ADMIN — INSULIN LISPRO 4 UNITS: 100 INJECTION, SOLUTION INTRAVENOUS; SUBCUTANEOUS at 11:36

## 2023-05-12 RX ADMIN — GABAPENTIN 900 MG: 300 CAPSULE ORAL at 13:52

## 2023-05-12 RX ADMIN — TIZANIDINE 4 MG: 4 TABLET ORAL at 05:14

## 2023-05-12 RX ADMIN — DOXYCYCLINE 100 MG: 100 INJECTION, POWDER, LYOPHILIZED, FOR SOLUTION INTRAVENOUS at 00:04

## 2023-05-12 RX ADMIN — INSULIN GLARGINE 80 UNITS: 100 INJECTION, SOLUTION SUBCUTANEOUS at 07:44

## 2023-05-12 RX ADMIN — DOXYCYCLINE 100 MG: 100 INJECTION, POWDER, LYOPHILIZED, FOR SOLUTION INTRAVENOUS at 23:56

## 2023-05-12 RX ADMIN — PIPERACILLIN AND TAZOBACTAM 3375 MG: 3; .375 INJECTION, POWDER, LYOPHILIZED, FOR SOLUTION INTRAVENOUS at 11:29

## 2023-05-12 RX ADMIN — GABAPENTIN 900 MG: 300 CAPSULE ORAL at 19:43

## 2023-05-12 RX ADMIN — FUROSEMIDE 40 MG: 40 TABLET ORAL at 07:43

## 2023-05-12 RX ADMIN — INSULIN LISPRO 5 UNITS: 100 INJECTION, SOLUTION INTRAVENOUS; SUBCUTANEOUS at 19:59

## 2023-05-12 RX ADMIN — LISINOPRIL 20 MG: 20 TABLET ORAL at 07:43

## 2023-05-12 RX ADMIN — OXYCODONE HYDROCHLORIDE 10 MG: 10 TABLET ORAL at 15:05

## 2023-05-12 RX ADMIN — OXYCODONE HYDROCHLORIDE 10 MG: 10 TABLET ORAL at 19:43

## 2023-05-12 RX ADMIN — DOXYCYCLINE 100 MG: 100 INJECTION, POWDER, LYOPHILIZED, FOR SOLUTION INTRAVENOUS at 13:56

## 2023-05-12 RX ADMIN — OXYCODONE HYDROCHLORIDE 10 MG: 10 TABLET ORAL at 23:54

## 2023-05-12 RX ADMIN — ATORVASTATIN CALCIUM 80 MG: 80 TABLET, FILM COATED ORAL at 19:43

## 2023-05-12 RX ADMIN — OXYCODONE HYDROCHLORIDE 10 MG: 10 TABLET ORAL at 02:36

## 2023-05-12 RX ADMIN — OXYCODONE HYDROCHLORIDE 10 MG: 10 TABLET ORAL at 07:43

## 2023-05-12 ASSESSMENT — PAIN SCALES - GENERAL
PAINLEVEL_OUTOF10: 10
PAINLEVEL_OUTOF10: 5
PAINLEVEL_OUTOF10: 3
PAINLEVEL_OUTOF10: 10
PAINLEVEL_OUTOF10: 10
PAINLEVEL_OUTOF10: 5
PAINLEVEL_OUTOF10: 9
PAINLEVEL_OUTOF10: 10
PAINLEVEL_OUTOF10: 10
PAINLEVEL_OUTOF10: 6
PAINLEVEL_OUTOF10: 7
PAINLEVEL_OUTOF10: 8

## 2023-05-12 ASSESSMENT — PAIN DESCRIPTION - ONSET
ONSET: SUDDEN
ONSET: ON-GOING
ONSET: ON-GOING

## 2023-05-12 ASSESSMENT — PAIN DESCRIPTION - ORIENTATION
ORIENTATION: LEFT

## 2023-05-12 ASSESSMENT — PAIN DESCRIPTION - PAIN TYPE
TYPE: SURGICAL PAIN

## 2023-05-12 ASSESSMENT — PAIN DESCRIPTION - LOCATION
LOCATION: FOOT

## 2023-05-12 ASSESSMENT — PAIN DESCRIPTION - FREQUENCY
FREQUENCY: CONTINUOUS
FREQUENCY: CONTINUOUS

## 2023-05-12 ASSESSMENT — PAIN SCALES - WONG BAKER: WONGBAKER_NUMERICALRESPONSE: 2

## 2023-05-12 ASSESSMENT — PAIN DESCRIPTION - DESCRIPTORS
DESCRIPTORS: BURNING
DESCRIPTORS: DULL;DISCOMFORT
DESCRIPTORS: BURNING;DISCOMFORT
DESCRIPTORS: BURNING;DISCOMFORT
DESCRIPTORS: BURNING

## 2023-05-12 ASSESSMENT — ENCOUNTER SYMPTOMS: COLOR CHANGE: 1

## 2023-05-13 LAB
ABSOLUTE EOS #: 0.2 K/UL (ref 0–0.4)
ABSOLUTE LYMPH #: 2.3 K/UL (ref 1–4.8)
ABSOLUTE MONO #: 0.6 K/UL (ref 0.1–1.3)
ANION GAP SERPL CALCULATED.3IONS-SCNC: 9 MMOL/L (ref 9–17)
BASOPHILS # BLD: 1 % (ref 0–2)
BASOPHILS ABSOLUTE: 0.1 K/UL (ref 0–0.2)
BUN SERPL-MCNC: 7 MG/DL (ref 6–20)
CALCIUM SERPL-MCNC: 9 MG/DL (ref 8.6–10.4)
CHLORIDE SERPL-SCNC: 102 MMOL/L (ref 98–107)
CO2 SERPL-SCNC: 27 MMOL/L (ref 20–31)
CREAT SERPL-MCNC: 0.74 MG/DL (ref 0.7–1.2)
EOSINOPHILS RELATIVE PERCENT: 2 % (ref 0–4)
GFR SERPL CREATININE-BSD FRML MDRD: >60 ML/MIN/1.73M2
GLUCOSE BLD-MCNC: 208 MG/DL (ref 75–110)
GLUCOSE BLD-MCNC: 226 MG/DL (ref 75–110)
GLUCOSE BLD-MCNC: 264 MG/DL (ref 75–110)
GLUCOSE BLD-MCNC: 85 MG/DL (ref 75–110)
GLUCOSE SERPL-MCNC: 219 MG/DL (ref 70–99)
HCT VFR BLD AUTO: 31.6 % (ref 41–53)
HGB BLD-MCNC: 11.5 G/DL (ref 13.5–17.5)
LYMPHOCYTES # BLD: 24 % (ref 24–44)
MCH RBC QN AUTO: 31.5 PG (ref 26–34)
MCHC RBC AUTO-ENTMCNC: 36.3 G/DL (ref 31–37)
MCV RBC AUTO: 86.9 FL (ref 80–100)
MONOCYTES # BLD: 6 % (ref 1–7)
PDW BLD-RTO: 13.9 % (ref 11.5–14.9)
PLATELET # BLD AUTO: 312 K/UL (ref 150–450)
PMV BLD AUTO: 6.3 FL (ref 6–12)
POTASSIUM SERPL-SCNC: 3.9 MMOL/L (ref 3.7–5.3)
RBC # BLD: 3.64 M/UL (ref 4.5–5.9)
SEG NEUTROPHILS: 67 % (ref 36–66)
SEGMENTED NEUTROPHILS ABSOLUTE COUNT: 6.5 K/UL (ref 1.3–9.1)
SODIUM SERPL-SCNC: 138 MMOL/L (ref 135–144)
WBC # BLD AUTO: 9.6 K/UL (ref 3.5–11)

## 2023-05-13 PROCEDURE — 6370000000 HC RX 637 (ALT 250 FOR IP): Performed by: FAMILY MEDICINE

## 2023-05-13 PROCEDURE — 2580000003 HC RX 258

## 2023-05-13 PROCEDURE — 36415 COLL VENOUS BLD VENIPUNCTURE: CPT

## 2023-05-13 PROCEDURE — 6360000002 HC RX W HCPCS

## 2023-05-13 PROCEDURE — 6370000000 HC RX 637 (ALT 250 FOR IP)

## 2023-05-13 PROCEDURE — 82947 ASSAY GLUCOSE BLOOD QUANT: CPT

## 2023-05-13 PROCEDURE — 99232 SBSQ HOSP IP/OBS MODERATE 35: CPT | Performed by: INTERNAL MEDICINE

## 2023-05-13 PROCEDURE — 80048 BASIC METABOLIC PNL TOTAL CA: CPT

## 2023-05-13 PROCEDURE — 2500000003 HC RX 250 WO HCPCS

## 2023-05-13 PROCEDURE — 1200000000 HC SEMI PRIVATE

## 2023-05-13 PROCEDURE — 85025 COMPLETE CBC W/AUTO DIFF WBC: CPT

## 2023-05-13 RX ORDER — INSULIN GLARGINE 100 [IU]/ML
50 INJECTION, SOLUTION SUBCUTANEOUS EVERY MORNING
Status: DISCONTINUED | OUTPATIENT
Start: 2023-05-14 | End: 2023-05-16

## 2023-05-13 RX ORDER — INSULIN GLARGINE 100 [IU]/ML
40 INJECTION, SOLUTION SUBCUTANEOUS NIGHTLY
Status: DISCONTINUED | OUTPATIENT
Start: 2023-05-13 | End: 2023-05-17 | Stop reason: HOSPADM

## 2023-05-13 RX ADMIN — GABAPENTIN 900 MG: 300 CAPSULE ORAL at 08:28

## 2023-05-13 RX ADMIN — PANTOPRAZOLE SODIUM 40 MG: 40 TABLET, DELAYED RELEASE ORAL at 05:54

## 2023-05-13 RX ADMIN — KETOROLAC TROMETHAMINE 30 MG: 30 INJECTION, SOLUTION INTRAMUSCULAR; INTRAVENOUS at 11:22

## 2023-05-13 RX ADMIN — GABAPENTIN 900 MG: 300 CAPSULE ORAL at 14:04

## 2023-05-13 RX ADMIN — GABAPENTIN 900 MG: 300 CAPSULE ORAL at 21:20

## 2023-05-13 RX ADMIN — RIVAROXABAN 20 MG: 20 TABLET, FILM COATED ORAL at 16:49

## 2023-05-13 RX ADMIN — PIPERACILLIN AND TAZOBACTAM 3375 MG: 3; .375 INJECTION, POWDER, LYOPHILIZED, FOR SOLUTION INTRAVENOUS at 20:11

## 2023-05-13 RX ADMIN — OXYCODONE HYDROCHLORIDE 10 MG: 10 TABLET ORAL at 14:07

## 2023-05-13 RX ADMIN — INSULIN LISPRO 5 UNITS: 100 INJECTION, SOLUTION INTRAVENOUS; SUBCUTANEOUS at 16:49

## 2023-05-13 RX ADMIN — OXYCODONE HYDROCHLORIDE 10 MG: 10 TABLET ORAL at 10:03

## 2023-05-13 RX ADMIN — INSULIN GLARGINE 40 UNITS: 100 INJECTION, SOLUTION SUBCUTANEOUS at 21:20

## 2023-05-13 RX ADMIN — INSULIN LISPRO 5 UNITS: 100 INJECTION, SOLUTION INTRAVENOUS; SUBCUTANEOUS at 08:30

## 2023-05-13 RX ADMIN — PIPERACILLIN AND TAZOBACTAM 3375 MG: 3; .375 INJECTION, POWDER, LYOPHILIZED, FOR SOLUTION INTRAVENOUS at 03:39

## 2023-05-13 RX ADMIN — METHADONE HYDROCHLORIDE 110 MG: 10 SOLUTION ORAL at 08:28

## 2023-05-13 RX ADMIN — INSULIN GLARGINE 50 UNITS: 100 INJECTION, SOLUTION SUBCUTANEOUS at 08:30

## 2023-05-13 RX ADMIN — FUROSEMIDE 40 MG: 40 TABLET ORAL at 08:28

## 2023-05-13 RX ADMIN — DOXYCYCLINE 100 MG: 100 INJECTION, POWDER, LYOPHILIZED, FOR SOLUTION INTRAVENOUS at 11:19

## 2023-05-13 RX ADMIN — ASPIRIN 81 MG: 81 TABLET, COATED ORAL at 08:28

## 2023-05-13 RX ADMIN — OXYCODONE HYDROCHLORIDE 10 MG: 10 TABLET ORAL at 21:20

## 2023-05-13 RX ADMIN — DULOXETINE HYDROCHLORIDE 30 MG: 30 CAPSULE, DELAYED RELEASE ORAL at 08:28

## 2023-05-13 RX ADMIN — INSULIN LISPRO 5 UNITS: 100 INJECTION, SOLUTION INTRAVENOUS; SUBCUTANEOUS at 11:17

## 2023-05-13 RX ADMIN — LISINOPRIL 20 MG: 20 TABLET ORAL at 08:28

## 2023-05-13 RX ADMIN — PIPERACILLIN AND TAZOBACTAM 3375 MG: 3; .375 INJECTION, POWDER, LYOPHILIZED, FOR SOLUTION INTRAVENOUS at 12:29

## 2023-05-13 RX ADMIN — ATORVASTATIN CALCIUM 80 MG: 80 TABLET, FILM COATED ORAL at 21:20

## 2023-05-13 RX ADMIN — OXYCODONE HYDROCHLORIDE 10 MG: 10 TABLET ORAL at 05:53

## 2023-05-13 ASSESSMENT — PAIN DESCRIPTION - LOCATION
LOCATION: FOOT
LOCATION: FOOT

## 2023-05-13 ASSESSMENT — PAIN SCALES - GENERAL
PAINLEVEL_OUTOF10: 9
PAINLEVEL_OUTOF10: 8
PAINLEVEL_OUTOF10: 9
PAINLEVEL_OUTOF10: 8

## 2023-05-13 ASSESSMENT — PAIN DESCRIPTION - ORIENTATION
ORIENTATION: LEFT
ORIENTATION: LEFT

## 2023-05-13 ASSESSMENT — ENCOUNTER SYMPTOMS: COLOR CHANGE: 1

## 2023-05-14 LAB
GLUCOSE BLD-MCNC: 124 MG/DL (ref 75–110)
GLUCOSE BLD-MCNC: 135 MG/DL (ref 75–110)
GLUCOSE BLD-MCNC: 222 MG/DL (ref 75–110)
GLUCOSE BLD-MCNC: 276 MG/DL (ref 75–110)

## 2023-05-14 PROCEDURE — 6370000000 HC RX 637 (ALT 250 FOR IP)

## 2023-05-14 PROCEDURE — 82947 ASSAY GLUCOSE BLOOD QUANT: CPT

## 2023-05-14 PROCEDURE — 6370000000 HC RX 637 (ALT 250 FOR IP): Performed by: FAMILY MEDICINE

## 2023-05-14 PROCEDURE — 2580000003 HC RX 258: Performed by: INTERNAL MEDICINE

## 2023-05-14 PROCEDURE — 6360000002 HC RX W HCPCS: Performed by: INTERNAL MEDICINE

## 2023-05-14 PROCEDURE — 2500000003 HC RX 250 WO HCPCS

## 2023-05-14 PROCEDURE — 99232 SBSQ HOSP IP/OBS MODERATE 35: CPT | Performed by: INTERNAL MEDICINE

## 2023-05-14 PROCEDURE — 1200000000 HC SEMI PRIVATE

## 2023-05-14 PROCEDURE — 2580000003 HC RX 258

## 2023-05-14 RX ORDER — BUPROPION HYDROCHLORIDE 150 MG/1
150 TABLET, EXTENDED RELEASE ORAL 2 TIMES DAILY
Status: DISCONTINUED | OUTPATIENT
Start: 2023-05-14 | End: 2023-05-17 | Stop reason: HOSPADM

## 2023-05-14 RX ORDER — NICOTINE 21 MG/24HR
1 PATCH, TRANSDERMAL 24 HOURS TRANSDERMAL DAILY
Status: DISCONTINUED | OUTPATIENT
Start: 2023-05-14 | End: 2023-05-15

## 2023-05-14 RX ORDER — BUPROPION HYDROCHLORIDE 75 MG/1
150 TABLET ORAL 2 TIMES DAILY
Status: DISCONTINUED | OUTPATIENT
Start: 2023-05-14 | End: 2023-05-14

## 2023-05-14 RX ADMIN — INSULIN GLARGINE 40 UNITS: 100 INJECTION, SOLUTION SUBCUTANEOUS at 21:15

## 2023-05-14 RX ADMIN — BUPROPION HYDROCHLORIDE 150 MG: 150 TABLET, FILM COATED, EXTENDED RELEASE ORAL at 21:14

## 2023-05-14 RX ADMIN — GABAPENTIN 900 MG: 300 CAPSULE ORAL at 14:00

## 2023-05-14 RX ADMIN — DOXYCYCLINE 100 MG: 100 INJECTION, POWDER, LYOPHILIZED, FOR SOLUTION INTRAVENOUS at 13:45

## 2023-05-14 RX ADMIN — RIVAROXABAN 20 MG: 20 TABLET, FILM COATED ORAL at 17:43

## 2023-05-14 RX ADMIN — GABAPENTIN 900 MG: 300 CAPSULE ORAL at 09:00

## 2023-05-14 RX ADMIN — DULOXETINE HYDROCHLORIDE 30 MG: 30 CAPSULE, DELAYED RELEASE ORAL at 09:00

## 2023-05-14 RX ADMIN — DOXYCYCLINE 100 MG: 100 INJECTION, POWDER, LYOPHILIZED, FOR SOLUTION INTRAVENOUS at 00:10

## 2023-05-14 RX ADMIN — OXYCODONE HYDROCHLORIDE 10 MG: 10 TABLET ORAL at 13:59

## 2023-05-14 RX ADMIN — PIPERACILLIN AND TAZOBACTAM 3375 MG: 3; .375 INJECTION, POWDER, LYOPHILIZED, FOR SOLUTION INTRAVENOUS at 21:23

## 2023-05-14 RX ADMIN — PIPERACILLIN AND TAZOBACTAM 3375 MG: 3; .375 INJECTION, POWDER, LYOPHILIZED, FOR SOLUTION INTRAVENOUS at 08:59

## 2023-05-14 RX ADMIN — PIPERACILLIN AND TAZOBACTAM 3375 MG: 3; .375 INJECTION, POWDER, LYOPHILIZED, FOR SOLUTION INTRAVENOUS at 02:08

## 2023-05-14 RX ADMIN — FUROSEMIDE 40 MG: 40 TABLET ORAL at 09:00

## 2023-05-14 RX ADMIN — PIPERACILLIN AND TAZOBACTAM 3375 MG: 3; .375 INJECTION, POWDER, LYOPHILIZED, FOR SOLUTION INTRAVENOUS at 15:19

## 2023-05-14 RX ADMIN — METHADONE HYDROCHLORIDE 110 MG: 10 SOLUTION ORAL at 11:06

## 2023-05-14 RX ADMIN — SODIUM CHLORIDE: 9 INJECTION, SOLUTION INTRAVENOUS at 16:07

## 2023-05-14 RX ADMIN — GABAPENTIN 900 MG: 300 CAPSULE ORAL at 21:14

## 2023-05-14 RX ADMIN — INSULIN GLARGINE 50 UNITS: 100 INJECTION, SOLUTION SUBCUTANEOUS at 09:02

## 2023-05-14 RX ADMIN — INSULIN LISPRO 5 UNITS: 100 INJECTION, SOLUTION INTRAVENOUS; SUBCUTANEOUS at 17:41

## 2023-05-14 RX ADMIN — LISINOPRIL 20 MG: 20 TABLET ORAL at 09:00

## 2023-05-14 RX ADMIN — BUPROPION HYDROCHLORIDE 150 MG: 150 TABLET, FILM COATED, EXTENDED RELEASE ORAL at 14:00

## 2023-05-14 RX ADMIN — OXYCODONE HYDROCHLORIDE 10 MG: 10 TABLET ORAL at 21:29

## 2023-05-14 RX ADMIN — ASPIRIN 81 MG: 81 TABLET, COATED ORAL at 09:00

## 2023-05-14 RX ADMIN — ATORVASTATIN CALCIUM 80 MG: 80 TABLET, FILM COATED ORAL at 21:17

## 2023-05-14 RX ADMIN — PANTOPRAZOLE SODIUM 40 MG: 40 TABLET, DELAYED RELEASE ORAL at 06:17

## 2023-05-14 ASSESSMENT — PAIN DESCRIPTION - LOCATION: LOCATION: FOOT

## 2023-05-14 ASSESSMENT — ENCOUNTER SYMPTOMS: COLOR CHANGE: 1

## 2023-05-14 ASSESSMENT — PAIN DESCRIPTION - ORIENTATION: ORIENTATION: LEFT

## 2023-05-14 ASSESSMENT — PAIN DESCRIPTION - DESCRIPTORS: DESCRIPTORS: ACHING;THROBBING

## 2023-05-14 ASSESSMENT — PAIN SCALES - GENERAL: PAINLEVEL_OUTOF10: 8

## 2023-05-15 LAB
GLUCOSE BLD-MCNC: 119 MG/DL (ref 75–110)
GLUCOSE BLD-MCNC: 149 MG/DL (ref 75–110)
GLUCOSE BLD-MCNC: 249 MG/DL (ref 75–110)
GLUCOSE BLD-MCNC: 288 MG/DL (ref 75–110)
MICROORGANISM SPEC CULT: NORMAL
MICROORGANISM SPEC CULT: NORMAL
SPECIMEN DESCRIPTION: NORMAL
SPECIMEN DESCRIPTION: NORMAL
SURGICAL PATHOLOGY REPORT: NORMAL

## 2023-05-15 PROCEDURE — 2500000003 HC RX 250 WO HCPCS

## 2023-05-15 PROCEDURE — 6370000000 HC RX 637 (ALT 250 FOR IP): Performed by: FAMILY MEDICINE

## 2023-05-15 PROCEDURE — 6360000002 HC RX W HCPCS: Performed by: INTERNAL MEDICINE

## 2023-05-15 PROCEDURE — 2580000003 HC RX 258: Performed by: INTERNAL MEDICINE

## 2023-05-15 PROCEDURE — 6370000000 HC RX 637 (ALT 250 FOR IP)

## 2023-05-15 PROCEDURE — 2580000003 HC RX 258

## 2023-05-15 PROCEDURE — 1200000000 HC SEMI PRIVATE

## 2023-05-15 PROCEDURE — 82947 ASSAY GLUCOSE BLOOD QUANT: CPT

## 2023-05-15 PROCEDURE — 99232 SBSQ HOSP IP/OBS MODERATE 35: CPT | Performed by: INTERNAL MEDICINE

## 2023-05-15 PROCEDURE — 6360000002 HC RX W HCPCS

## 2023-05-15 RX ORDER — NICOTINE 21 MG/24HR
1 PATCH, TRANSDERMAL 24 HOURS TRANSDERMAL DAILY
Status: DISCONTINUED | OUTPATIENT
Start: 2023-05-15 | End: 2023-05-17 | Stop reason: HOSPADM

## 2023-05-15 RX ORDER — LANOLIN ALCOHOL/MO/W.PET/CERES
CREAM (GRAM) TOPICAL 2 TIMES DAILY
Status: DISCONTINUED | OUTPATIENT
Start: 2023-05-15 | End: 2023-05-17 | Stop reason: HOSPADM

## 2023-05-15 RX ADMIN — DULOXETINE HYDROCHLORIDE 30 MG: 30 CAPSULE, DELAYED RELEASE ORAL at 09:01

## 2023-05-15 RX ADMIN — ATORVASTATIN CALCIUM 80 MG: 80 TABLET, FILM COATED ORAL at 20:15

## 2023-05-15 RX ADMIN — PIPERACILLIN AND TAZOBACTAM 3375 MG: 3; .375 INJECTION, POWDER, LYOPHILIZED, FOR SOLUTION INTRAVENOUS at 09:12

## 2023-05-15 RX ADMIN — PIPERACILLIN AND TAZOBACTAM 3375 MG: 3; .375 INJECTION, POWDER, LYOPHILIZED, FOR SOLUTION INTRAVENOUS at 20:12

## 2023-05-15 RX ADMIN — GABAPENTIN 900 MG: 300 CAPSULE ORAL at 09:00

## 2023-05-15 RX ADMIN — METHADONE HYDROCHLORIDE 110 MG: 10 SOLUTION ORAL at 09:41

## 2023-05-15 RX ADMIN — DOXYCYCLINE 100 MG: 100 INJECTION, POWDER, LYOPHILIZED, FOR SOLUTION INTRAVENOUS at 01:15

## 2023-05-15 RX ADMIN — PIPERACILLIN AND TAZOBACTAM 3375 MG: 3; .375 INJECTION, POWDER, LYOPHILIZED, FOR SOLUTION INTRAVENOUS at 02:24

## 2023-05-15 RX ADMIN — LISINOPRIL 20 MG: 20 TABLET ORAL at 09:01

## 2023-05-15 RX ADMIN — INSULIN LISPRO 5 UNITS: 100 INJECTION, SOLUTION INTRAVENOUS; SUBCUTANEOUS at 17:39

## 2023-05-15 RX ADMIN — INSULIN GLARGINE 40 UNITS: 100 INJECTION, SOLUTION SUBCUTANEOUS at 21:16

## 2023-05-15 RX ADMIN — KETOROLAC TROMETHAMINE 30 MG: 30 INJECTION, SOLUTION INTRAMUSCULAR; INTRAVENOUS at 09:02

## 2023-05-15 RX ADMIN — BUPROPION HYDROCHLORIDE 150 MG: 150 TABLET, FILM COATED, EXTENDED RELEASE ORAL at 20:15

## 2023-05-15 RX ADMIN — PANTOPRAZOLE SODIUM 40 MG: 40 TABLET, DELAYED RELEASE ORAL at 06:36

## 2023-05-15 RX ADMIN — GABAPENTIN 900 MG: 300 CAPSULE ORAL at 15:09

## 2023-05-15 RX ADMIN — RIVAROXABAN 20 MG: 20 TABLET, FILM COATED ORAL at 17:39

## 2023-05-15 RX ADMIN — PIPERACILLIN AND TAZOBACTAM 3375 MG: 3; .375 INJECTION, POWDER, LYOPHILIZED, FOR SOLUTION INTRAVENOUS at 15:10

## 2023-05-15 RX ADMIN — INSULIN GLARGINE 50 UNITS: 100 INJECTION, SOLUTION SUBCUTANEOUS at 09:04

## 2023-05-15 RX ADMIN — Medication: at 11:08

## 2023-05-15 RX ADMIN — FUROSEMIDE 40 MG: 40 TABLET ORAL at 09:01

## 2023-05-15 RX ADMIN — GABAPENTIN 900 MG: 300 CAPSULE ORAL at 20:15

## 2023-05-15 RX ADMIN — TIZANIDINE 4 MG: 4 TABLET ORAL at 02:26

## 2023-05-15 RX ADMIN — DOXYCYCLINE 100 MG: 100 INJECTION, POWDER, LYOPHILIZED, FOR SOLUTION INTRAVENOUS at 12:05

## 2023-05-15 RX ADMIN — BUPROPION HYDROCHLORIDE 150 MG: 150 TABLET, FILM COATED, EXTENDED RELEASE ORAL at 09:01

## 2023-05-15 RX ADMIN — Medication: at 20:18

## 2023-05-15 RX ADMIN — INSULIN LISPRO 5 UNITS: 100 INJECTION, SOLUTION INTRAVENOUS; SUBCUTANEOUS at 11:51

## 2023-05-15 RX ADMIN — OXYCODONE HYDROCHLORIDE 10 MG: 10 TABLET ORAL at 03:37

## 2023-05-15 RX ADMIN — OXYCODONE HYDROCHLORIDE 10 MG: 10 TABLET ORAL at 17:39

## 2023-05-15 RX ADMIN — OXYCODONE HYDROCHLORIDE 10 MG: 10 TABLET ORAL at 11:01

## 2023-05-15 RX ADMIN — KETOROLAC TROMETHAMINE 30 MG: 30 INJECTION, SOLUTION INTRAMUSCULAR; INTRAVENOUS at 01:21

## 2023-05-15 RX ADMIN — ASPIRIN 81 MG: 81 TABLET, COATED ORAL at 09:01

## 2023-05-15 ASSESSMENT — PAIN DESCRIPTION - LOCATION
LOCATION: FOOT

## 2023-05-15 ASSESSMENT — PAIN SCALES - WONG BAKER
WONGBAKER_NUMERICALRESPONSE: 0

## 2023-05-15 ASSESSMENT — PAIN SCALES - GENERAL
PAINLEVEL_OUTOF10: 0
PAINLEVEL_OUTOF10: 8
PAINLEVEL_OUTOF10: 9
PAINLEVEL_OUTOF10: 8
PAINLEVEL_OUTOF10: 0
PAINLEVEL_OUTOF10: 1
PAINLEVEL_OUTOF10: 7
PAINLEVEL_OUTOF10: 8
PAINLEVEL_OUTOF10: 0
PAINLEVEL_OUTOF10: 7
PAINLEVEL_OUTOF10: 8

## 2023-05-15 ASSESSMENT — PAIN DESCRIPTION - ORIENTATION
ORIENTATION: LEFT

## 2023-05-15 ASSESSMENT — PAIN DESCRIPTION - DESCRIPTORS
DESCRIPTORS: ACHING
DESCRIPTORS: SHARP
DESCRIPTORS: SHARP;DISCOMFORT
DESCRIPTORS: SHARP
DESCRIPTORS: ACHING
DESCRIPTORS: ACHING
DESCRIPTORS: SHARP

## 2023-05-15 ASSESSMENT — PAIN - FUNCTIONAL ASSESSMENT
PAIN_FUNCTIONAL_ASSESSMENT: ACTIVITIES ARE NOT PREVENTED
PAIN_FUNCTIONAL_ASSESSMENT: PREVENTS OR INTERFERES WITH MANY ACTIVE NOT PASSIVE ACTIVITIES
PAIN_FUNCTIONAL_ASSESSMENT: ACTIVITIES ARE NOT PREVENTED
PAIN_FUNCTIONAL_ASSESSMENT: ACTIVITIES ARE NOT PREVENTED

## 2023-05-15 ASSESSMENT — ENCOUNTER SYMPTOMS: COLOR CHANGE: 1

## 2023-05-15 NOTE — PLAN OF CARE
Problem: Discharge Planning  Goal: Discharge to home or other facility with appropriate resources  5/15/2023 1754 by Kassi Terrazas RN  Outcome: Progressing  Flowsheets (Taken 5/15/2023 5805)  Discharge to home or other facility with appropriate resources:   Identify barriers to discharge with patient and caregiver   Arrange for needed discharge resources and transportation as appropriate   Identify discharge learning needs (meds, wound care, etc)   Refer to discharge planning if patient needs post-hospital services based on physician order or complex needs related to functional status, cognitive ability or social support system     Problem: Safety - Adult  Goal: Free from fall injury  5/15/2023 1754 by Kassi Terrazas RN  Outcome: Progressing     Problem: ABCDS Injury Assessment  Goal: Absence of physical injury  5/15/2023 1754 by Kassi Terrazas RN  Outcome: Progressing     Problem: Pain  Goal: Verbalizes/displays adequate comfort level or baseline comfort level  5/15/2023 1754 by Kassi Terrazas RN  Outcome: Progressing     Problem: Nutrition Deficit:  Goal: Optimize nutritional status  5/15/2023 1754 by Kassi Terrazas RN  Outcome: Progressing     Problem: Chronic Conditions and Co-morbidities  Goal: Patient's chronic conditions and co-morbidity symptoms are monitored and maintained or improved  5/15/2023 1754 by Kassi Terrazas RN  Outcome: Progressing

## 2023-05-15 NOTE — CARE COORDINATION
ONGOING DISCHARGE PLAN:    Patient is alert and oriented x4. Spoke with patient regarding discharge plan and patient confirms that plan is still home. POD #4  left foot fifth digit amp and bone bx    Primary delayed closure on 5/16    DME walker at home    IV zosyn/doxy per ID oral at discharge    Will continue to follow for additional discharge needs. If patient is discharged prior to next notation, then this note serves as note for discharge by case management.     Electronically signed by Harsha Frye RN on 5/15/2023 at 10:02 AM

## 2023-05-15 NOTE — PLAN OF CARE
Problem: Discharge Planning  Goal: Discharge to home or other facility with appropriate resources  5/15/2023 0514 by Meena Hewitt RN  Outcome: Progressing     Problem: Safety - Adult  Goal: Free from fall injury  5/15/2023 0514 by Meena Hewitt RN  Outcome: Progressing     Problem: ABCDS Injury Assessment  Goal: Absence of physical injury  Outcome: Progressing     Problem: Pain  Goal: Verbalizes/displays adequate comfort level or baseline comfort level  Outcome: Progressing     Problem: Nutrition Deficit:  Goal: Optimize nutritional status  Outcome: Progressing     Problem: Chronic Conditions and Co-morbidities  Goal: Patient's chronic conditions and co-morbidity symptoms are monitored and maintained or improved  Outcome: Progressing

## 2023-05-16 ENCOUNTER — APPOINTMENT (OUTPATIENT)
Dept: ULTRASOUND IMAGING | Age: 53
DRG: 617 | End: 2023-05-16
Payer: COMMERCIAL

## 2023-05-16 ENCOUNTER — ANESTHESIA EVENT (OUTPATIENT)
Dept: OPERATING ROOM | Age: 53
DRG: 617 | End: 2023-05-16
Payer: COMMERCIAL

## 2023-05-16 LAB
ALBUMIN SERPL-MCNC: 3.3 G/DL (ref 3.5–5.2)
ALP SERPL-CCNC: 81 U/L (ref 40–129)
ALT SERPL-CCNC: 22 U/L (ref 5–41)
ANION GAP SERPL CALCULATED.3IONS-SCNC: 8 MMOL/L (ref 9–17)
AST SERPL-CCNC: 18 U/L
BASOPHILS # BLD: 0.1 K/UL (ref 0–0.2)
BASOPHILS # BLD: 1 % (ref 0–2)
BILIRUB DIRECT SERPL-MCNC: 0.1 MG/DL
BILIRUB INDIRECT SERPL-MCNC: 0.2 MG/DL (ref 0–1)
BILIRUB SERPL-MCNC: 0.3 MG/DL (ref 0.3–1.2)
BUN SERPL-MCNC: 14 MG/DL (ref 6–20)
CALCIUM SERPL-MCNC: 9 MG/DL (ref 8.6–10.4)
CHLORIDE SERPL-SCNC: 101 MMOL/L (ref 98–107)
CO2 SERPL-SCNC: 30 MMOL/L (ref 20–31)
CREAT SERPL-MCNC: 0.78 MG/DL (ref 0.7–1.2)
EOSINOPHIL # BLD: 0.4 K/UL (ref 0–0.4)
EOSINOPHILS RELATIVE PERCENT: 4 % (ref 0–4)
ERYTHROCYTE [DISTWIDTH] IN BLOOD BY AUTOMATED COUNT: 14 % (ref 11.5–14.9)
ERYTHROCYTE [SEDIMENTATION RATE] IN BLOOD BY WESTERGREN METHOD: 44 MM/HR (ref 0–20)
GFR SERPL CREATININE-BSD FRML MDRD: >60 ML/MIN/1.73M2
GLUCOSE BLD-MCNC: 128 MG/DL (ref 75–110)
GLUCOSE BLD-MCNC: 134 MG/DL (ref 75–110)
GLUCOSE BLD-MCNC: 168 MG/DL (ref 75–110)
GLUCOSE BLD-MCNC: 254 MG/DL (ref 75–110)
GLUCOSE SERPL-MCNC: 108 MG/DL (ref 70–99)
HCT VFR BLD AUTO: 31.8 % (ref 41–53)
HGB BLD-MCNC: 10.5 G/DL (ref 13.5–17.5)
LIPASE SERPL-CCNC: 13 U/L (ref 13–60)
LYMPHOCYTES # BLD: 27 % (ref 24–44)
LYMPHOCYTES NFR BLD: 2.6 K/UL (ref 1–4.8)
MCH RBC QN AUTO: 29 PG (ref 26–34)
MCHC RBC AUTO-ENTMCNC: 33.1 G/DL (ref 31–37)
MCV RBC AUTO: 87.7 FL (ref 80–100)
MICROORGANISM SPEC CULT: ABNORMAL
MICROORGANISM/AGENT SPEC: ABNORMAL
MONOCYTES NFR BLD: 0.6 K/UL (ref 0.1–1.3)
MONOCYTES NFR BLD: 6 % (ref 1–7)
NEUTROPHILS NFR BLD: 62 % (ref 36–66)
NEUTS SEG NFR BLD: 5.9 K/UL (ref 1.3–9.1)
PLATELET # BLD AUTO: 311 K/UL (ref 150–450)
PMV BLD AUTO: 6.7 FL (ref 6–12)
POTASSIUM SERPL-SCNC: 4.2 MMOL/L (ref 3.7–5.3)
PROT SERPL-MCNC: 7.7 G/DL (ref 6.4–8.3)
RBC # BLD AUTO: 3.63 M/UL (ref 4.5–5.9)
SODIUM SERPL-SCNC: 139 MMOL/L (ref 135–144)
SPECIMEN DESCRIPTION: ABNORMAL
SPECIMEN DESCRIPTION: ABNORMAL
WBC OTHER # BLD: 9.5 K/UL (ref 3.5–11)

## 2023-05-16 PROCEDURE — 82947 ASSAY GLUCOSE BLOOD QUANT: CPT

## 2023-05-16 PROCEDURE — 97530 THERAPEUTIC ACTIVITIES: CPT

## 2023-05-16 PROCEDURE — 83690 ASSAY OF LIPASE: CPT

## 2023-05-16 PROCEDURE — 6370000000 HC RX 637 (ALT 250 FOR IP)

## 2023-05-16 PROCEDURE — 2580000003 HC RX 258: Performed by: INTERNAL MEDICINE

## 2023-05-16 PROCEDURE — 1200000000 HC SEMI PRIVATE

## 2023-05-16 PROCEDURE — 6370000000 HC RX 637 (ALT 250 FOR IP): Performed by: FAMILY MEDICINE

## 2023-05-16 PROCEDURE — 76705 ECHO EXAM OF ABDOMEN: CPT

## 2023-05-16 PROCEDURE — 97166 OT EVAL MOD COMPLEX 45 MIN: CPT

## 2023-05-16 PROCEDURE — 80048 BASIC METABOLIC PNL TOTAL CA: CPT

## 2023-05-16 PROCEDURE — 6360000002 HC RX W HCPCS: Performed by: INTERNAL MEDICINE

## 2023-05-16 PROCEDURE — 85025 COMPLETE CBC W/AUTO DIFF WBC: CPT

## 2023-05-16 PROCEDURE — 80076 HEPATIC FUNCTION PANEL: CPT

## 2023-05-16 PROCEDURE — 2580000003 HC RX 258

## 2023-05-16 PROCEDURE — 6360000002 HC RX W HCPCS

## 2023-05-16 PROCEDURE — 2500000003 HC RX 250 WO HCPCS

## 2023-05-16 PROCEDURE — 36415 COLL VENOUS BLD VENIPUNCTURE: CPT

## 2023-05-16 PROCEDURE — 85652 RBC SED RATE AUTOMATED: CPT

## 2023-05-16 RX ORDER — GABAPENTIN 300 MG/1
900 CAPSULE ORAL 3 TIMES DAILY
Status: DISCONTINUED | OUTPATIENT
Start: 2023-05-16 | End: 2023-05-17 | Stop reason: HOSPADM

## 2023-05-16 RX ORDER — INSULIN GLARGINE 100 [IU]/ML
52 INJECTION, SOLUTION SUBCUTANEOUS EVERY MORNING
Status: DISCONTINUED | OUTPATIENT
Start: 2023-05-17 | End: 2023-05-17 | Stop reason: HOSPADM

## 2023-05-16 RX ORDER — INSULIN GLARGINE 100 [IU]/ML
54 INJECTION, SOLUTION SUBCUTANEOUS EVERY MORNING
Status: DISCONTINUED | OUTPATIENT
Start: 2023-05-16 | End: 2023-05-16

## 2023-05-16 RX ADMIN — GABAPENTIN 900 MG: 300 CAPSULE ORAL at 20:32

## 2023-05-16 RX ADMIN — LISINOPRIL 20 MG: 20 TABLET ORAL at 11:25

## 2023-05-16 RX ADMIN — OXYCODONE HYDROCHLORIDE 10 MG: 10 TABLET ORAL at 11:41

## 2023-05-16 RX ADMIN — ASPIRIN 81 MG: 81 TABLET, COATED ORAL at 11:25

## 2023-05-16 RX ADMIN — PIPERACILLIN AND TAZOBACTAM 3375 MG: 3; .375 INJECTION, POWDER, LYOPHILIZED, FOR SOLUTION INTRAVENOUS at 16:54

## 2023-05-16 RX ADMIN — GABAPENTIN 900 MG: 300 CAPSULE ORAL at 11:41

## 2023-05-16 RX ADMIN — ATORVASTATIN CALCIUM 80 MG: 80 TABLET, FILM COATED ORAL at 20:32

## 2023-05-16 RX ADMIN — PIPERACILLIN AND TAZOBACTAM 3375 MG: 3; .375 INJECTION, POWDER, LYOPHILIZED, FOR SOLUTION INTRAVENOUS at 07:51

## 2023-05-16 RX ADMIN — PIPERACILLIN AND TAZOBACTAM 3375 MG: 3; .375 INJECTION, POWDER, LYOPHILIZED, FOR SOLUTION INTRAVENOUS at 02:47

## 2023-05-16 RX ADMIN — OXYCODONE HYDROCHLORIDE 10 MG: 10 TABLET ORAL at 00:20

## 2023-05-16 RX ADMIN — KETOROLAC TROMETHAMINE 30 MG: 30 INJECTION, SOLUTION INTRAMUSCULAR; INTRAVENOUS at 05:57

## 2023-05-16 RX ADMIN — DULOXETINE HYDROCHLORIDE 30 MG: 30 CAPSULE, DELAYED RELEASE ORAL at 11:26

## 2023-05-16 RX ADMIN — RIVAROXABAN 20 MG: 20 TABLET, FILM COATED ORAL at 16:59

## 2023-05-16 RX ADMIN — BUPROPION HYDROCHLORIDE 150 MG: 150 TABLET, FILM COATED, EXTENDED RELEASE ORAL at 20:32

## 2023-05-16 RX ADMIN — OXYCODONE HYDROCHLORIDE 10 MG: 10 TABLET ORAL at 06:17

## 2023-05-16 RX ADMIN — Medication: at 07:51

## 2023-05-16 RX ADMIN — DOXYCYCLINE 100 MG: 100 INJECTION, POWDER, LYOPHILIZED, FOR SOLUTION INTRAVENOUS at 00:19

## 2023-05-16 RX ADMIN — DOXYCYCLINE 100 MG: 100 INJECTION, POWDER, LYOPHILIZED, FOR SOLUTION INTRAVENOUS at 14:11

## 2023-05-16 RX ADMIN — PIPERACILLIN AND TAZOBACTAM 3375 MG: 3; .375 INJECTION, POWDER, LYOPHILIZED, FOR SOLUTION INTRAVENOUS at 19:39

## 2023-05-16 RX ADMIN — Medication: at 20:43

## 2023-05-16 RX ADMIN — OXYCODONE HYDROCHLORIDE 10 MG: 10 TABLET ORAL at 20:39

## 2023-05-16 RX ADMIN — PANTOPRAZOLE SODIUM 40 MG: 40 TABLET, DELAYED RELEASE ORAL at 06:17

## 2023-05-16 RX ADMIN — BUPROPION HYDROCHLORIDE 150 MG: 150 TABLET, FILM COATED, EXTENDED RELEASE ORAL at 11:24

## 2023-05-16 RX ADMIN — METHADONE HYDROCHLORIDE 110 MG: 10 SOLUTION ORAL at 12:12

## 2023-05-16 RX ADMIN — FUROSEMIDE 40 MG: 40 TABLET ORAL at 11:25

## 2023-05-16 ASSESSMENT — PAIN DESCRIPTION - ORIENTATION
ORIENTATION: LEFT

## 2023-05-16 ASSESSMENT — PAIN SCALES - GENERAL
PAINLEVEL_OUTOF10: 8
PAINLEVEL_OUTOF10: 8
PAINLEVEL_OUTOF10: 6
PAINLEVEL_OUTOF10: 8
PAINLEVEL_OUTOF10: 0

## 2023-05-16 ASSESSMENT — PAIN DESCRIPTION - DESCRIPTORS
DESCRIPTORS: ACHING

## 2023-05-16 ASSESSMENT — PAIN DESCRIPTION - LOCATION
LOCATION: FOOT
LOCATION: FOOT
LOCATION: LEG
LOCATION: FOOT
LOCATION: LEG
LOCATION: FOOT

## 2023-05-16 ASSESSMENT — ENCOUNTER SYMPTOMS: COLOR CHANGE: 1

## 2023-05-16 ASSESSMENT — LIFESTYLE VARIABLES: SMOKING_STATUS: 1

## 2023-05-16 NOTE — DISCHARGE INSTRUCTIONS
Podiatry Discharge instructions:  -Please follow with Dr. Lesia Alvarez office 1 week after discharge  -Recommend crutches, knee scooter, walker to use for ambulation after surgery  -Please leave dressing clean dry and intact.   Do not get dressings wet in the shower  -Please wear surgical shoe at all times and do not bear weight on the left foot

## 2023-05-16 NOTE — DISCHARGE INSTR - COC
Continuity of Care Form    Patient Name: Zeus Hassan   :  1970  MRN:  [id number]    Admit date:  5/10/2023  Discharge date:  ***    Code Status Order: Full Code   Advance Directives:   Advance Care Flowsheet Documentation       Date/Time Healthcare Directive Type of Healthcare Directive Copy in [address] Agent's Name Healthcare Agent's Phone Number    [phone] No, patient does not have an advance directive for healthcare treatment -- -- -- -- --            Admitting Physician:  Mary Ellen Clemente DO  PCP: Julita Atkins MD    Discharging Nurse: [facility] Unit/Room#: 2074/2074-01  Discharging Unit Phone Number: ***    Emergency Contact:   Extended Emergency Contact Information  Primary Emergency Contact: Michelle Shabazz  Address: [address], [address]  Home Phone: [phone]  Mobile Phone: [phone]  Relation: Spouse  Secondary Emergency Contact: Alison Estrella, [address]  Home Phone: [phone]  Relation: Parent    Past Surgical History:  Past Surgical History:   Procedure Laterality Date    FEMORAL BYPASS Left 2022    DR Rogers Night    FEMORAL BYPASS N/A 2022    LEFT LOWER EXTREMITY ANGIOGRAM, POPLITEAL ENDARTERECTOMY WITH PHOTOFIX BOVINE PATCH 0.8X8CM PATCH ANGIOPLASTY, AT/TPT ENDARTERECTOMY, FEMORAL POPLITEAL BYPASS WITH REVERSE SAPHENOUS VEIN GRAFT, SHARP EXCISIONAL DEBRIDEMENT SKIN 30SQCM performed by Luz Lawrence MD at [address] Left 2023    Amputation of fifth digit and metatarsal Excision of bone for biopsy performed by Juarez Oneal DPM at [address]       Immunization History: There is no immunization history on file for this patient.     Active Problems:  Patient Active Problem List   Diagnosis Code    Type 2 diabetes mellitus, with long-term current use of insulin ([facility]) E11.9, Z79.4    Smoker F17.200    Furuncle of neck L02.12    Sebaceous cyst

## 2023-05-16 NOTE — CARE COORDINATION
ONGOING DISCHARGE PLAN:    Patient is alert and oriented x4. Spoke with patient regarding discharge plan and patient confirms that plan is still home. POD#5 left foot fifth digit amp and bone bx. Delayed closure has been changed to Wednesday 5/17 @ noon    Gallbladder US ordered    ID consult-IV zosyn/doxy, oral at discharge    Will continue to follow for additional discharge needs. If patient is discharged prior to next notation, then this note serves as note for discharge by case management.     Electronically signed by Mahendra Bravo RN on 5/16/2023 at 10:00 AM

## 2023-05-16 NOTE — PLAN OF CARE
Problem: Discharge Planning  Goal: Discharge to home or other facility with appropriate resources  5/16/2023 0815 by Poonam Benedict RN  Outcome: Progressing     Problem: Safety - Adult  Goal: Free from fall injury  5/16/2023 0815 by Poonam Benedict RN  Outcome: Progressing     Problem: ABCDS Injury Assessment  Goal: Absence of physical injury  5/16/2023 0815 by Poonam Benedict RN  Outcome: Progressing     Problem: Pain  Goal: Verbalizes/displays adequate comfort level or baseline comfort level  5/16/2023 0815 by Poonam Benedict RN  Outcome: Progressing     Problem: Nutrition Deficit:  Goal: Optimize nutritional status  5/16/2023 0815 by Poonam Benedict RN  Outcome: Progressing     Problem: Chronic Conditions and Co-morbidities  Goal: Patient's chronic conditions and co-morbidity symptoms are monitored and maintained or improved  5/16/2023 0815 by Poonam Benedict RN  Outcome: Progressing

## 2023-05-16 NOTE — PLAN OF CARE
Problem: Discharge Planning  Goal: Discharge to home or other facility with appropriate resources  5/16/2023 0530 by Ursula Mcgill RN  Outcome: Progressing     Problem: Safety - Adult  Goal: Free from fall injury  5/16/2023 0530 by Ursula Mcgill RN  Outcome: Progressing     Problem: ABCDS Injury Assessment  Goal: Absence of physical injury  5/16/2023 0530 by Ursula Mcgill RN  Outcome: Progressing     Problem: Pain  Goal: Verbalizes/displays adequate comfort level or baseline comfort level  5/16/2023 0530 by Ursula Mcgill RN  Outcome: Progressing     Problem: Nutrition Deficit:  Goal: Optimize nutritional status  5/16/2023 0530 by Ursula Mcgill RN  Outcome: Progressing     Problem: Chronic Conditions and Co-morbidities  Goal: Patient's chronic conditions and co-morbidity symptoms are monitored and maintained or improved  5/16/2023 0530 by Ursula Mcgill RN  Outcome: Progressing

## 2023-05-17 ENCOUNTER — ANESTHESIA (OUTPATIENT)
Dept: OPERATING ROOM | Age: 53
DRG: 617 | End: 2023-05-17
Payer: COMMERCIAL

## 2023-05-17 VITALS
BODY MASS INDEX: 30.07 KG/M2 | WEIGHT: 222 LBS | DIASTOLIC BLOOD PRESSURE: 81 MMHG | HEIGHT: 72 IN | HEART RATE: 60 BPM | RESPIRATION RATE: 12 BRPM | SYSTOLIC BLOOD PRESSURE: 127 MMHG | OXYGEN SATURATION: 95 % | TEMPERATURE: 97.3 F

## 2023-05-17 LAB
ANION GAP SERPL CALCULATED.3IONS-SCNC: 6 MMOL/L (ref 9–17)
BASOPHILS # BLD: 0 K/UL (ref 0–0.2)
BASOPHILS NFR BLD: 1 % (ref 0–2)
BUN SERPL-MCNC: 13 MG/DL (ref 6–20)
CALCIUM SERPL-MCNC: 9 MG/DL (ref 8.6–10.4)
CHLORIDE SERPL-SCNC: 101 MMOL/L (ref 98–107)
CO2 SERPL-SCNC: 31 MMOL/L (ref 20–31)
CREAT SERPL-MCNC: 0.72 MG/DL (ref 0.7–1.2)
CRP SERPL HS-MCNC: 8.5 MG/L (ref 0–5)
EOSINOPHIL # BLD: 0.3 K/UL (ref 0–0.4)
EOSINOPHILS RELATIVE PERCENT: 3 % (ref 0–4)
ERYTHROCYTE [DISTWIDTH] IN BLOOD BY AUTOMATED COUNT: 13.9 % (ref 11.5–14.9)
GFR SERPL CREATININE-BSD FRML MDRD: >60 ML/MIN/1.73M2
GLUCOSE BLD-MCNC: 125 MG/DL (ref 75–110)
GLUCOSE BLD-MCNC: 149 MG/DL (ref 75–110)
GLUCOSE BLD-MCNC: 150 MG/DL (ref 75–110)
GLUCOSE BLD-MCNC: 199 MG/DL (ref 75–110)
GLUCOSE SERPL-MCNC: 171 MG/DL (ref 70–99)
HCT VFR BLD AUTO: 30 % (ref 41–53)
HGB BLD-MCNC: 10.4 G/DL (ref 13.5–17.5)
LYMPHOCYTES # BLD: 27 % (ref 24–44)
LYMPHOCYTES NFR BLD: 2.1 K/UL (ref 1–4.8)
MCH RBC QN AUTO: 30.3 PG (ref 26–34)
MCHC RBC AUTO-ENTMCNC: 34.8 G/DL (ref 31–37)
MCV RBC AUTO: 87.1 FL (ref 80–100)
MONOCYTES NFR BLD: 0.5 K/UL (ref 0.1–1.3)
MONOCYTES NFR BLD: 7 % (ref 1–7)
NEUTROPHILS NFR BLD: 62 % (ref 36–66)
NEUTS SEG NFR BLD: 5.1 K/UL (ref 1.3–9.1)
PLATELET # BLD AUTO: 259 K/UL (ref 150–450)
PMV BLD AUTO: 6.2 FL (ref 6–12)
POTASSIUM SERPL-SCNC: 4.4 MMOL/L (ref 3.7–5.3)
RBC # BLD AUTO: 3.45 M/UL (ref 4.5–5.9)
SODIUM SERPL-SCNC: 138 MMOL/L (ref 135–144)
WBC OTHER # BLD: 8.1 K/UL (ref 3.5–11)

## 2023-05-17 PROCEDURE — 80048 BASIC METABOLIC PNL TOTAL CA: CPT

## 2023-05-17 PROCEDURE — 7100000000 HC PACU RECOVERY - FIRST 15 MIN: Performed by: PODIATRIST

## 2023-05-17 PROCEDURE — 0JQR0ZZ REPAIR LEFT FOOT SUBCUTANEOUS TISSUE AND FASCIA, OPEN APPROACH: ICD-10-PCS

## 2023-05-17 PROCEDURE — 6370000000 HC RX 637 (ALT 250 FOR IP)

## 2023-05-17 PROCEDURE — 86140 C-REACTIVE PROTEIN: CPT

## 2023-05-17 PROCEDURE — 3700000000 HC ANESTHESIA ATTENDED CARE: Performed by: PODIATRIST

## 2023-05-17 PROCEDURE — 0QBP0ZZ EXCISION OF LEFT METATARSAL, OPEN APPROACH: ICD-10-PCS

## 2023-05-17 PROCEDURE — 6360000002 HC RX W HCPCS

## 2023-05-17 PROCEDURE — 85025 COMPLETE CBC W/AUTO DIFF WBC: CPT

## 2023-05-17 PROCEDURE — 6370000000 HC RX 637 (ALT 250 FOR IP): Performed by: FAMILY MEDICINE

## 2023-05-17 PROCEDURE — 2580000003 HC RX 258: Performed by: INTERNAL MEDICINE

## 2023-05-17 PROCEDURE — 97530 THERAPEUTIC ACTIVITIES: CPT

## 2023-05-17 PROCEDURE — 3600000002 HC SURGERY LEVEL 2 BASE: Performed by: PODIATRIST

## 2023-05-17 PROCEDURE — 3600000012 HC SURGERY LEVEL 2 ADDTL 15MIN: Performed by: PODIATRIST

## 2023-05-17 PROCEDURE — 3700000001 HC ADD 15 MINUTES (ANESTHESIA): Performed by: PODIATRIST

## 2023-05-17 PROCEDURE — 2500000003 HC RX 250 WO HCPCS: Performed by: PODIATRIST

## 2023-05-17 PROCEDURE — 6360000002 HC RX W HCPCS: Performed by: NURSE ANESTHETIST, CERTIFIED REGISTERED

## 2023-05-17 PROCEDURE — 97162 PT EVAL MOD COMPLEX 30 MIN: CPT

## 2023-05-17 PROCEDURE — 2580000003 HC RX 258

## 2023-05-17 PROCEDURE — 2500000003 HC RX 250 WO HCPCS

## 2023-05-17 PROCEDURE — 97168 OT RE-EVAL EST PLAN CARE: CPT

## 2023-05-17 PROCEDURE — 97116 GAIT TRAINING THERAPY: CPT

## 2023-05-17 PROCEDURE — 2709999900 HC NON-CHARGEABLE SUPPLY: Performed by: PODIATRIST

## 2023-05-17 PROCEDURE — 82947 ASSAY GLUCOSE BLOOD QUANT: CPT

## 2023-05-17 PROCEDURE — 36415 COLL VENOUS BLD VENIPUNCTURE: CPT

## 2023-05-17 PROCEDURE — 6360000002 HC RX W HCPCS: Performed by: INTERNAL MEDICINE

## 2023-05-17 PROCEDURE — 2500000003 HC RX 250 WO HCPCS: Performed by: NURSE ANESTHETIST, CERTIFIED REGISTERED

## 2023-05-17 PROCEDURE — 7100000001 HC PACU RECOVERY - ADDTL 15 MIN: Performed by: PODIATRIST

## 2023-05-17 DEVICE — DRESSING WND MICRONIZED PARTIC 500 MG MATRISTEM MICROMATRIX: Type: IMPLANTABLE DEVICE | Site: FOOT | Status: FUNCTIONAL

## 2023-05-17 RX ORDER — LIDOCAINE HYDROCHLORIDE 10 MG/ML
INJECTION, SOLUTION INFILTRATION; PERINEURAL PRN
Status: DISCONTINUED | OUTPATIENT
Start: 2023-05-17 | End: 2023-05-17 | Stop reason: ALTCHOICE

## 2023-05-17 RX ORDER — ONDANSETRON 2 MG/ML
4 INJECTION INTRAMUSCULAR; INTRAVENOUS
Status: DISCONTINUED | OUTPATIENT
Start: 2023-05-17 | End: 2023-05-17

## 2023-05-17 RX ORDER — BUPROPION HYDROCHLORIDE 150 MG/1
150 TABLET, EXTENDED RELEASE ORAL 2 TIMES DAILY
Qty: 60 TABLET | Refills: 3 | Status: SHIPPED | OUTPATIENT
Start: 2023-05-17

## 2023-05-17 RX ORDER — LIDOCAINE HYDROCHLORIDE 10 MG/ML
INJECTION, SOLUTION EPIDURAL; INFILTRATION; INTRACAUDAL; PERINEURAL PRN
Status: DISCONTINUED | OUTPATIENT
Start: 2023-05-17 | End: 2023-05-17 | Stop reason: SDUPTHER

## 2023-05-17 RX ORDER — OXYCODONE HYDROCHLORIDE 5 MG/1
5 TABLET ORAL EVERY 6 HOURS PRN
Qty: 12 TABLET | Refills: 0 | Status: SHIPPED | OUTPATIENT
Start: 2023-05-17 | End: 2023-05-20

## 2023-05-17 RX ORDER — PROPOFOL 10 MG/ML
INJECTION, EMULSION INTRAVENOUS PRN
Status: DISCONTINUED | OUTPATIENT
Start: 2023-05-17 | End: 2023-05-17 | Stop reason: SDUPTHER

## 2023-05-17 RX ORDER — BUPIVACAINE HYDROCHLORIDE 5 MG/ML
INJECTION, SOLUTION EPIDURAL; INTRACAUDAL PRN
Status: DISCONTINUED | OUTPATIENT
Start: 2023-05-17 | End: 2023-05-17 | Stop reason: ALTCHOICE

## 2023-05-17 RX ORDER — MIDAZOLAM HYDROCHLORIDE 1 MG/ML
INJECTION INTRAMUSCULAR; INTRAVENOUS PRN
Status: DISCONTINUED | OUTPATIENT
Start: 2023-05-17 | End: 2023-05-17 | Stop reason: SDUPTHER

## 2023-05-17 RX ORDER — INSULIN GLARGINE 100 [IU]/ML
52 INJECTION, SOLUTION SUBCUTANEOUS EVERY MORNING
Qty: 10 ML | Refills: 3 | Status: SHIPPED | OUTPATIENT
Start: 2023-05-17

## 2023-05-17 RX ORDER — LANOLIN ALCOHOL/MO/W.PET/CERES
CREAM (GRAM) TOPICAL 2 TIMES DAILY
Qty: 1 EACH | Refills: 2
Start: 2023-05-17

## 2023-05-17 RX ORDER — SODIUM CHLORIDE 0.9 % (FLUSH) 0.9 %
5-40 SYRINGE (ML) INJECTION PRN
Status: DISCONTINUED | OUTPATIENT
Start: 2023-05-17 | End: 2023-05-17

## 2023-05-17 RX ORDER — FENTANYL CITRATE 50 UG/ML
INJECTION, SOLUTION INTRAMUSCULAR; INTRAVENOUS PRN
Status: DISCONTINUED | OUTPATIENT
Start: 2023-05-17 | End: 2023-05-17 | Stop reason: SDUPTHER

## 2023-05-17 RX ORDER — CYCLOBENZAPRINE HCL 10 MG
10 TABLET ORAL 3 TIMES DAILY PRN
Qty: 21 TABLET | Refills: 0 | Status: SHIPPED | OUTPATIENT
Start: 2023-05-17 | End: 2023-05-27

## 2023-05-17 RX ORDER — INSULIN GLARGINE 100 [IU]/ML
40 INJECTION, SOLUTION SUBCUTANEOUS NIGHTLY
Qty: 10 ML | Refills: 3 | Status: SHIPPED | OUTPATIENT
Start: 2023-05-17

## 2023-05-17 RX ORDER — LEVOFLOXACIN 500 MG/1
500 TABLET, FILM COATED ORAL DAILY
Qty: 10 TABLET | Refills: 0
Start: 2023-05-17 | End: 2023-05-31

## 2023-05-17 RX ORDER — DIPHENHYDRAMINE HYDROCHLORIDE 50 MG/ML
12.5 INJECTION INTRAMUSCULAR; INTRAVENOUS
Status: DISCONTINUED | OUTPATIENT
Start: 2023-05-17 | End: 2023-05-17

## 2023-05-17 RX ORDER — LEVOFLOXACIN 500 MG/1
500 TABLET, FILM COATED ORAL DAILY
Qty: 14 TABLET | Refills: 0
Start: 2023-05-17 | End: 2023-05-17 | Stop reason: HOSPADM

## 2023-05-17 RX ORDER — KETOROLAC TROMETHAMINE 10 MG/1
10 TABLET, FILM COATED ORAL EVERY 6 HOURS PRN
Qty: 28 TABLET | Refills: 0 | Status: SHIPPED | OUTPATIENT
Start: 2023-05-17 | End: 2023-05-24

## 2023-05-17 RX ORDER — LEVOFLOXACIN 500 MG/1
500 TABLET, FILM COATED ORAL DAILY
Qty: 10 TABLET | Refills: 0
Start: 2023-05-17 | End: 2023-05-17 | Stop reason: SDUPTHER

## 2023-05-17 RX ORDER — SODIUM CHLORIDE 9 MG/ML
INJECTION, SOLUTION INTRAVENOUS PRN
Status: DISCONTINUED | OUTPATIENT
Start: 2023-05-17 | End: 2023-05-17

## 2023-05-17 RX ORDER — SODIUM CHLORIDE 0.9 % (FLUSH) 0.9 %
5-40 SYRINGE (ML) INJECTION EVERY 12 HOURS SCHEDULED
Status: DISCONTINUED | OUTPATIENT
Start: 2023-05-17 | End: 2023-05-17

## 2023-05-17 RX ADMIN — PANTOPRAZOLE SODIUM 40 MG: 40 TABLET, DELAYED RELEASE ORAL at 06:18

## 2023-05-17 RX ADMIN — RIVAROXABAN 20 MG: 20 TABLET, FILM COATED ORAL at 17:41

## 2023-05-17 RX ADMIN — DOXYCYCLINE 100 MG: 100 INJECTION, POWDER, LYOPHILIZED, FOR SOLUTION INTRAVENOUS at 00:07

## 2023-05-17 RX ADMIN — PIPERACILLIN AND TAZOBACTAM 3375 MG: 3; .375 INJECTION, POWDER, LYOPHILIZED, FOR SOLUTION INTRAVENOUS at 14:42

## 2023-05-17 RX ADMIN — BUPROPION HYDROCHLORIDE 150 MG: 150 TABLET, FILM COATED, EXTENDED RELEASE ORAL at 14:35

## 2023-05-17 RX ADMIN — PIPERACILLIN AND TAZOBACTAM 3375 MG: 3; .375 INJECTION, POWDER, LYOPHILIZED, FOR SOLUTION INTRAVENOUS at 08:31

## 2023-05-17 RX ADMIN — FENTANYL CITRATE 100 MCG: 50 INJECTION, SOLUTION INTRAMUSCULAR; INTRAVENOUS at 12:02

## 2023-05-17 RX ADMIN — SODIUM CHLORIDE: 9 INJECTION, SOLUTION INTRAVENOUS at 08:28

## 2023-05-17 RX ADMIN — METHADONE HYDROCHLORIDE 110 MG: 10 SOLUTION ORAL at 10:21

## 2023-05-17 RX ADMIN — PROPOFOL 50 MCG/KG/MIN: 10 INJECTION, EMULSION INTRAVENOUS at 12:04

## 2023-05-17 RX ADMIN — LIDOCAINE HYDROCHLORIDE 50 MG: 10 INJECTION, SOLUTION EPIDURAL; INFILTRATION; INTRACAUDAL; PERINEURAL at 12:02

## 2023-05-17 RX ADMIN — MIDAZOLAM 2 MG: 1 INJECTION INTRAMUSCULAR; INTRAVENOUS at 12:02

## 2023-05-17 RX ADMIN — LISINOPRIL 20 MG: 20 TABLET ORAL at 14:35

## 2023-05-17 RX ADMIN — PIPERACILLIN AND TAZOBACTAM 3375 MG: 3; .375 INJECTION, POWDER, LYOPHILIZED, FOR SOLUTION INTRAVENOUS at 01:48

## 2023-05-17 RX ADMIN — DOXYCYCLINE 100 MG: 100 INJECTION, POWDER, LYOPHILIZED, FOR SOLUTION INTRAVENOUS at 10:50

## 2023-05-17 RX ADMIN — GABAPENTIN 900 MG: 300 CAPSULE ORAL at 14:34

## 2023-05-17 RX ADMIN — PROPOFOL 50 MG: 10 INJECTION, EMULSION INTRAVENOUS at 12:02

## 2023-05-17 RX ADMIN — OXYCODONE HYDROCHLORIDE 10 MG: 10 TABLET ORAL at 04:21

## 2023-05-17 ASSESSMENT — PAIN SCALES - GENERAL
PAINLEVEL_OUTOF10: 3
PAINLEVEL_OUTOF10: 8
PAINLEVEL_OUTOF10: 7

## 2023-05-17 ASSESSMENT — PAIN DESCRIPTION - LOCATION
LOCATION: FOOT
LOCATION: FOOT

## 2023-05-17 ASSESSMENT — PAIN DESCRIPTION - DESCRIPTORS: DESCRIPTORS: BURNING

## 2023-05-17 ASSESSMENT — PAIN DESCRIPTION - ORIENTATION
ORIENTATION: LEFT
ORIENTATION: LEFT

## 2023-05-17 ASSESSMENT — PAIN - FUNCTIONAL ASSESSMENT: PAIN_FUNCTIONAL_ASSESSMENT: 0-10

## 2023-05-17 ASSESSMENT — ENCOUNTER SYMPTOMS: COLOR CHANGE: 1

## 2023-05-17 NOTE — CARE COORDINATION
I spoke with [place] with [facility]. Orders were received and will be delivered to home.  Electronically signed by Alonso Fong RN on 5/17/2023 at 4:41 PM

## 2023-05-17 NOTE — ANESTHESIA POSTPROCEDURE EVALUATION
POST- ANESTHESIA EVALUATION       Pt Name: Vernell Markham  MRN: [id number]  YOB: 1970  Date of evaluation: 5/17/2023  Time:  4:40 PM      /81   Pulse 60   Temp 97.3 °F (36.3 °C) (Infrared)   Resp 12   Ht 6' (1.829 m)   Wt 222 lb (100.7 kg)   SpO2 95%   BMI 30.11 kg/m²      Consciousness Level  Awake  Cardiopulmonary Status  Stable  Pain Adequately Treated YES  Nausea / Vomiting  NO  Adequate Hydration  YES  Anesthesia Related Complications NONE      Electronically signed by Eddie Chaney MD on 5/17/2023 at 4:40 PM       Department of Anesthesiology  Postprocedure Note    Patient: Vernell Markham  MRN: [id number]  YOB: 1970  Date of evaluation: 5/17/2023      Procedure Summary     Date: 05/17/23 Room / Location: [address] [place] [address]: [facility]    Anesthesia Start: 1200 Anesthesia Stop: 4608    Procedure: DELAYED PRIMARY CLOSURE OF FOOT, EXCISONAL DEBRIDEMENT OF WOUND (Left: Foot) Diagnosis:       Osteomyelitis of left foot, unspecified type ([address]      (OSTEOMYELITIS LEFT FOOT)      (IP RM 9384)    Surgeons: Jessica Markham DPM Responsible Provider: Garima Rubio MD    Anesthesia Type: General ASA Status: 3          Anesthesia Type: General    Silvano Phase I: Silvano Score: 9    Silvano Phase II:        Anesthesia Post Evaluation

## 2023-05-17 NOTE — DISCHARGE SUMMARY
Physician Discharge Summary     Patient ID:  Jackie Mathis  [id number]  68 y.o.  1970    Admit date: 5/10/2023    Discharge date and time: 5/17/2023    Admitting Physician: Gwyn López DO     Discharge Physician: Gwyn López DO      Admission Diagnoses:   Osteomyelitis [facility]) [M86.9]  Foot abscess, left [L02.612]  Osteomyelitis of left foot, unspecified type [facility]) [M86.9]    Discharge Diagnoses:   Principal Problem:    Osteomyelitis ([facility]  Active Problems:    Foot abscess, left    Peripheral vascular disease ([facility]  Resolved Problems:    * No resolved hospital problems. *     Type 2 diabetes mellitus, with long-term current use of insulin ([facility]) E11.9, Z79.4    Smoker F17.200    Furuncle of neck L02.12    Sebaceous cyst L72.3    Opioid dependence with withdrawal ([facility]) F11.23    Anxiety and depression F41.9, F32. 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    PAD (peripheral artery disease) ([facility]) I73.9    Fissure in skin of foot R23.4    Gangrene of left foot ([facility]) I96    Limb ischemia I99.8    Status post femoral-popliteal bypass surgery Z95.828    S/P femoral-popliteal bypass surgery Z95.828    Osteomyelitis of foot, left, acute ([facility]) M86.172    Osteomyelitis ([facility]) M86.9    Foot abscess, left L02.612    Peripheral vascular disease ([facility]) I73.9      Left foot osteomyelitis     Left foot abscess     Bilateral legs look red but they look more sunburn than cellulitis  Of diabetes mellitus  Review of drug abuse        Vascular surgery  Chronic wound goes to the wound care  Status left fifth toe amputation  Open wound plan for delayed closure tomorrow  Dry skin to lower extremities  History of heavy smoking patient has a nicotine patch but is 14 mg he said he
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
## 2023-05-17 NOTE — PLAN OF CARE
Problem: Discharge Planning  Goal: Discharge to home or other facility with appropriate resources  5/17/2023 1914 by Guillermo Koyanagi, RN  Outcome: Completed     Problem: Safety - Adult  Goal: Free from fall injury  5/17/2023 1914 by Guillermo Koyanagi, RN  Outcome: Completed     Problem: ABCDS Injury Assessment  Goal: Absence of physical injury  5/17/2023 1914 by Guillermo Koyanagi, RN  Outcome: Completed     Problem: Pain  Goal: Verbalizes/displays adequate comfort level or baseline comfort level  5/17/2023 1914 by Guillermo Koyanagi, RN  Outcome: Completed     Problem: Nutrition Deficit:  Goal: Optimize nutritional status  5/17/2023 1914 by Guillermo Koyanagi, RN  Outcome: Completed     Problem: Nutrition Deficit:  Goal: Optimize nutritional status  5/17/2023 1914 by Guillermo Koyanagi, RN  Outcome: Completed     Problem: Chronic Conditions and Co-morbidities  Goal: Patient's chronic conditions and co-morbidity symptoms are monitored and maintained or improved  5/17/2023 1914 by Guillermo Koyanagi, RN  Outcome: Completed

## 2023-05-17 NOTE — CARE COORDINATION
[address] Encounter Date/Time: 5/10/2023 [address] [place] [address] Account: [de-identified]    MRN: [id number]    Patient: Angelic Coleman    Contact Serial #: [id number]      ENCOUNTER          Patient Class: I Private Enc? No Unit RM BD: [address] 15    Hospital Service: MED   Encounter DX: Osteomyelitis ([address] [A41*   ADM Provider: Stephane Vo DO   Procedure:     ATT Provider: Stephane Vo DO   REF Provider:        Admission DX: Osteomyelitis ([address] Foot abscess, left, Osteomyelitis of left foot, unspecified type ([address] and DX codes: M86.9, L02.612, M86.9      PATIENT                 Name: Angelic Coleman : 1970 (53 yrs)   Address: [place] [address] Sex: Male   [facility] [address]         Marital Status:    Employer: DISABLED         Mandaen: None   Primary Care Provider: Georgia Aviles MD         Primary Phone: [phone]   EMERGENCY CONTACT   Contact Name Legal Guardian? Relationship to Patient Home Phone Work Phone   1. Michelle Shabazz  2. Nick Zabala      Spouse  Parent [phone]              GUARANTOR            Guarantor: Angelic Coleman     : 1970   Address: [address] [place] [address] Sex: Male     [address]     Relation to Patient: Self       Home Phone: [phone]   Guarantor ID: [id number]       Work Phone: [phone]   Guarantor Employer: DISABLED         Status: DISABLED      COVERAGE        PRIMARY INSURANCE   Payor: [address]: HAILEE/ [address]   Payor Address: [address] [place] [address]       Group Number: [id number] Insurance Type: [address] Name: Red Martinez Subscriber : 1972   Subscriber ID: [id number] Pat. Rel. to Sub: Spouse   SECONDARY INSURANCE   Payor:   Plan:     Payor Address:  ,           Group Number:   Insurance Type:     Subscriber Name:   Subscriber :     Subscriber ID:   Pat.  Rel. to Sub:          CSN: [id number]      [id number]        CSN

## 2023-05-17 NOTE — CARE COORDINATION
Face sheet and DME order for walker faxed to [address] [place] [address].  Electronically signed by Mary Addison RN on 5/17/2023 at 3:38 PM

## 2023-05-17 NOTE — CARE COORDINATION
ONGOING DISCHARGE PLAN:    Patient is alert and oriented x4. Spoke with patient regarding discharge plan and patient confirms that plan is still home. POD#6 left foot fifth digit amp and bone bx    Delayed primary closure left foot today    Need therapy post op for recommendations, WBAT in post op shoe    Plan for discharge after surgery , f/u in 1 week    GB ultrasound-cholelithiasis    ID consult-IV doxy/zosyn, need final recommendations. Likely oral.    Will continue to follow for additional discharge needs. If patient is discharged prior to next notation, then this note serves as note for discharge by case management.     Electronically signed by Zenaida Landrum RN on 5/17/2023 at 10:12 AM

## 2023-05-17 NOTE — OP NOTE
Operative Note      Patient: Eneida Gregg  YOB: 1970  MRN: [id number]    Date of Procedure: 5/17/2023    Pre-Op Diagnosis Codes:     * Osteomyelitis of left foot, unspecified type ([facility] [M86.9]    Post-Op Diagnosis: Same       Procedures:  Excisional debridement of wound 16 cm sq, left foot  Delayed primary closure, left foot    Surgeon(s):  Nory Storm DPM    Assistant:   Resident: Eleanora Sandhoff, DPM    Anesthesia: Monitor Anesthesia Care, 10 cc of 1:1 mix of 0.5% marcaine plain and 1% lidocaine plain preoperatively, 10 cc 0.5% Marcaine plain postoperatively    Hemostasis: Pneumatic ankle tourniquet was applied to the left ankle but was not inflated during surgery. Direct pressure and hemostasis via electrocautery was used. Injectables: None     Estimated Blood Loss (mL): less than 50     Complications: None    Specimens:   * No specimens in log *    Implants:  Implant Name Type Inv. Item Serial No.  Lot No. LRB No. Used Action   DRESSING WND MICRONIZED PARTIC 500 MG MATRISTEM MICROMATRIX - [id number]  DRESSING WND MICRONIZED PARTIC 500 MG MATRISTEM MICROMATRIX  [employer] [id number] Left 1 Implanted         Drains: * No LDAs found *    Findings: Surgical wound appeared healthy and viable. Good perfusion noted throughout procedure. No maceration or necrotic tissue throughout. No purulence or signs of infection. Remaining fifth metatarsal base appeared hard and intact. 500 mg of micro matrix used. See below for further details. Detailed Description of Procedure: Indications for procedure: Patient underwent left partial fifth metatarsal amputation on 5/11/2023 due to osteomyelitis of the fifth metatarsal, septic arthritis of the fifth MTPJ, adjacent abscess. Surgical site was left open for several days for drainage and patient was monitored on IV antibiotics.   Patient requires OR for means of formalization of fifth metatarsal amputation and delayed flap

## 2023-05-18 ENCOUNTER — TELEPHONE (OUTPATIENT)
Dept: PODIATRY | Age: 53
End: 2023-05-18

## 2023-05-25 ENCOUNTER — OFFICE VISIT (OUTPATIENT)
Dept: PODIATRY | Age: 53
End: 2023-05-25
Payer: COMMERCIAL

## 2023-05-25 VITALS — HEIGHT: 72 IN | BODY MASS INDEX: 30.07 KG/M2 | WEIGHT: 222 LBS

## 2023-05-25 DIAGNOSIS — E11.51 DIABETES MELLITUS WITH PERIPHERAL VASCULAR DISEASE (HCC): ICD-10-CM

## 2023-05-25 DIAGNOSIS — L08.9 TYPE 2 DIABETES MELLITUS WITH LEFT DIABETIC FOOT INFECTION (HCC): Primary | ICD-10-CM

## 2023-05-25 DIAGNOSIS — E11.628 TYPE 2 DIABETES MELLITUS WITH LEFT DIABETIC FOOT INFECTION (HCC): Primary | ICD-10-CM

## 2023-05-25 DIAGNOSIS — Z89.422 HX OF AMPUTATION OF LESSER TOE, LEFT (HCC): ICD-10-CM

## 2023-05-25 PROCEDURE — 99212 OFFICE O/P EST SF 10 MIN: CPT | Performed by: PODIATRIST

## 2023-05-25 NOTE — PROGRESS NOTES
[facility] Podiatry  Post Op note  Chief Complaint   Patient presents with    Post-Op Check     Amputation of fifth digit and metatarsal Excision of bone for biopsy    Diabetes     FLOWER Martin is a 48y.o. year old male who is   post op from foot surgery. Amputation partial 5th ray left, done on 5/11/23 and delayed closure on 5/17/23 . Vital signs are stable. Pain level is 1. Patient denies N/V/F/C. Patient has been compliant with postoperative instructions. He has been putting weight on his foot against orders. Review of Systems    Vascular: DP and PT pulses palpable 1/4, Right Foot and 1/4 on the Left Foot. CFT <3 seconds, Right Foot and <3 seconds on the Left Foot. Edema is absent,  Right Foot and presenton the Left Foot. Neurological:   Sensation absent  to light touch to level of digits, the left foot. Musculoskeletal:   Muscle strength is 5/5 on the Right Foot and 5/5 on the Left Foot. Structural deformities are present on the Right Foot and present on the Left Foot. Integument:  Warm, dry, supple the left foot. Incisions are healing well. Wound dehiscence is absent. Infection is absent. Assesment : Post operative progress gradually improving. Diagnosis Orders   1. Type 2 diabetes mellitus with left diabetic foot infection ([address]        2. Diabetes mellitus with peripheral vascular disease ([facility]        3. Hx of amputation of lesser toe, left (HCC)              Plan: Sutures in place. Dry sterile dressing applied. Keep surgical site dry. Ice and elevation as directed. No orders of the defined types were placed in this encounter. Follow up 1week(s).

## 2023-06-01 ENCOUNTER — OFFICE VISIT (OUTPATIENT)
Dept: PODIATRY | Age: 53
End: 2023-06-01
Payer: COMMERCIAL

## 2023-06-01 VITALS — HEIGHT: 72 IN | BODY MASS INDEX: 30.07 KG/M2 | WEIGHT: 222 LBS

## 2023-06-01 DIAGNOSIS — L08.9 TYPE 2 DIABETES MELLITUS WITH LEFT DIABETIC FOOT INFECTION (HCC): Primary | ICD-10-CM

## 2023-06-01 DIAGNOSIS — E11.51 DIABETES MELLITUS WITH PERIPHERAL VASCULAR DISEASE (HCC): ICD-10-CM

## 2023-06-01 DIAGNOSIS — E11.628 TYPE 2 DIABETES MELLITUS WITH LEFT DIABETIC FOOT INFECTION (HCC): Primary | ICD-10-CM

## 2023-06-01 DIAGNOSIS — Z89.422 HX OF AMPUTATION OF LESSER TOE, LEFT (HCC): ICD-10-CM

## 2023-06-01 PROCEDURE — 99213 OFFICE O/P EST LOW 20 MIN: CPT | Performed by: PODIATRIST

## 2023-06-01 RX ORDER — INSULIN DETEMIR 100 [IU]/ML
INJECTION, SOLUTION SUBCUTANEOUS
COMMUNITY
Start: 2023-04-30

## 2023-06-01 NOTE — PROGRESS NOTES
elevation as directed. Hemiguard removed  Steri strips placed  Incision and foot cleaned    No orders of the defined types were placed in this encounter. Follow up 1week(s).

## 2023-06-07 ENCOUNTER — OFFICE VISIT (OUTPATIENT)
Dept: INFECTIOUS DISEASES | Age: 53
End: 2023-06-07
Payer: COMMERCIAL

## 2023-06-07 VITALS
HEIGHT: 73 IN | SYSTOLIC BLOOD PRESSURE: 125 MMHG | HEART RATE: 69 BPM | DIASTOLIC BLOOD PRESSURE: 88 MMHG | TEMPERATURE: 98.6 F | WEIGHT: 222 LBS | RESPIRATION RATE: 18 BRPM | BODY MASS INDEX: 29.42 KG/M2

## 2023-06-07 DIAGNOSIS — I96 GANGRENE OF LEFT FOOT (HCC): Primary | ICD-10-CM

## 2023-06-07 PROCEDURE — 3079F DIAST BP 80-89 MM HG: CPT | Performed by: INTERNAL MEDICINE

## 2023-06-07 PROCEDURE — 3074F SYST BP LT 130 MM HG: CPT | Performed by: INTERNAL MEDICINE

## 2023-06-07 PROCEDURE — 99214 OFFICE O/P EST MOD 30 MIN: CPT | Performed by: INTERNAL MEDICINE

## 2023-06-07 RX ORDER — LEVOFLOXACIN 500 MG/1
500 TABLET, FILM COATED ORAL DAILY
Qty: 14 TABLET | Refills: 0 | Status: SHIPPED | OUTPATIENT
Start: 2023-06-07 | End: 2023-06-21

## 2023-06-07 NOTE — PROGRESS NOTES
[facility] -   Infectious diseases evaluation  admission date 5/10/2023    reason for consultation:   Left foot infection    Impression :   Current:  Left foot wound with cellulitis, abscess, fifth MTP joint septic arthritis and osteomyelitis to the   5th metatarsal head and 5th proximal phalanx base. Status post fifth ray amputation 5/11/2023 group B streptococcus grew on culture  Peripheral vascular disease status post femoral bypass surgery    on the left 12/2022  History of drug abuse  Asymptomatic cholelithiasis    Recommendations   P.o. Levaquin for 2 weeks  Follow C-reactive routine, sed rate BUN/creatinine and CBC. Follow-up in 2 weeks          History of Present Illness:   Initial history:  Navarro Madsen is a 48y.o.-year-old male was hospitalized recently  for left foot infection/abscess with suspected 5th metatarsal head and 5th proximal phalanx base. The patient had history of peripheral vascular disease status post femoral bypass surgery on 12/2022 with postoperative nonhealing wound. He was treated with oral doxycycline initially with no significant improvement. The patient was started on oral Levaquin on 5/3/2023 with improvement  Tissue culture from 5/3/2023 grew Pseudomonas aeruginosa, Citrobacter and group B streptococcus  MRI of the foot reviewed suggestive of septic fifth MTP joint, abscess to the plantar aspect of the distal fifth metatarsal with osteomyelitis to the fifth metatarsal and proximal phalanx of the fifth toe. Status post fifth ray amputation 5/11/2023 group B streptococcus grew on culture  He was treated with IV antibiotics during his hospitalization was discharged on oral antibiotics that was completed. Interval changes 6/7/23  He is feeling well today, postoperative incision healing with no significant redness, no open wounds or drainage.   He denied fever or chills, no nausea or vomiting, no diarrhea,  Patient Vitals

## 2023-06-15 NOTE — PLAN OF CARE
Problem: Discharge Planning  Goal: Discharge to home or other facility with appropriate resources  12/21/2022 0718 by Elmo Del Toro RN  Outcome: Progressing  12/21/2022 0007 by Regina Bautista RN  Outcome: Progressing     Problem: Safety - Adult  Goal: Free from fall injury  12/21/2022 0718 by Elmo Del Toro RN  Outcome: Progressing  12/21/2022 0007 by Regina Bautista RN  Outcome: Progressing     Problem: ABCDS Injury Assessment  Goal: Absence of physical injury  12/21/2022 0718 by Elmo Del Toro RN  Outcome: Progressing  12/21/2022 0007 by Regina Bautista RN  Outcome: Progressing     Problem: Pain  Goal: Verbalizes/displays adequate comfort level or baseline comfort level  12/21/2022 0718 by Elmo Del Toro RN  Outcome: Progressing  12/21/2022 0007 by Regina Bautista RN  Outcome: Progressing  Flowsheets  Taken 12/20/2022 1600 by Elmo Del Toro RN  Verbalizes/displays adequate comfort level or baseline comfort level:   Encourage patient to monitor pain and request assistance   Assess pain using appropriate pain scale   Administer analgesics based on type and severity of pain and evaluate response   Implement non-pharmacological measures as appropriate and evaluate response  Taken 12/20/2022 1200 by Elmo Del Toro RN  Verbalizes/displays adequate comfort level or baseline comfort level:   Encourage patient to monitor pain and request assistance   Assess pain using appropriate pain scale   Administer analgesics based on type and severity of pain and evaluate response     Problem: Chronic Conditions and Co-morbidities  Goal: Patient's chronic conditions and co-morbidity symptoms are monitored and maintained or improved  12/21/2022 0718 by Elmo Del Toro RN  Outcome: Progressing  12/21/2022 0007 by Regina Bautista RN  Outcome: Progressing     Problem: Skin/Tissue Integrity  Goal: Absence of new skin breakdown  Description: 1. Monitor for areas of redness and/or skin breakdown  2.   Assess vascular access sites hourly  3. Every 4-6 hours minimum:  Change oxygen saturation probe site  4. Every 4-6 hours:  If on nasal continuous positive airway pressure, respiratory therapy assess nares and determine need for appliance change or resting period.   12/21/2022 0718 by Manav Snow RN  Outcome: Progressing  12/21/2022 0007 by Alfredo Schuster RN  Outcome: Progressing See provider note

## 2023-06-19 ENCOUNTER — HOSPITAL ENCOUNTER (OUTPATIENT)
Age: 53
Discharge: HOME | End: 2023-06-19
Payer: COMMERCIAL

## 2023-06-19 DIAGNOSIS — I96 GANGRENE OF LEFT FOOT (HCC): ICD-10-CM

## 2023-06-19 LAB
BUN SERPL-MCNC: 13 MG/DL (ref 6–20)
CREAT SERPL-MCNC: 0.7 MG/DL (ref 0.7–1.2)
CRP SERPL HS-MCNC: 6.3 MG/L (ref 0–5)
ERYTHROCYTE [DISTWIDTH] IN BLOOD BY AUTOMATED COUNT: 15.1 % (ref 11.5–14.9)
ERYTHROCYTE [SEDIMENTATION RATE] IN BLOOD BY WESTERGREN METHOD: 34 MM/HR (ref 0–20)
GFR SERPL CREATININE-BSD FRML MDRD: >60 ML/MIN/1.73M2
HCT VFR BLD AUTO: 36.8 % (ref 41–53)
HGB BLD-MCNC: 12.6 G/DL (ref 13.5–17.5)
MCH RBC QN AUTO: 30.3 PG (ref 26–34)
MCHC RBC AUTO-ENTMCNC: 34.2 G/DL (ref 31–37)
MCV RBC AUTO: 88.4 FL (ref 80–100)
PLATELET # BLD AUTO: 232 K/UL (ref 150–450)
PMV BLD AUTO: 7.4 FL (ref 6–12)
RBC # BLD AUTO: 4.16 M/UL (ref 4.5–5.9)
WBC OTHER # BLD: 7 K/UL (ref 3.5–11)

## 2023-06-19 PROCEDURE — 86140 C-REACTIVE PROTEIN: CPT

## 2023-06-19 PROCEDURE — 36415 COLL VENOUS BLD VENIPUNCTURE: CPT

## 2023-06-19 PROCEDURE — 85027 COMPLETE CBC AUTOMATED: CPT

## 2023-06-19 PROCEDURE — 84520 ASSAY OF UREA NITROGEN: CPT

## 2023-06-19 PROCEDURE — 85652 RBC SED RATE AUTOMATED: CPT

## 2023-06-19 PROCEDURE — 82565 ASSAY OF CREATININE: CPT

## 2023-06-21 ENCOUNTER — OFFICE VISIT (OUTPATIENT)
Dept: INFECTIOUS DISEASES | Age: 53
End: 2023-06-21
Payer: COMMERCIAL

## 2023-06-21 VITALS
SYSTOLIC BLOOD PRESSURE: 127 MMHG | DIASTOLIC BLOOD PRESSURE: 67 MMHG | RESPIRATION RATE: 15 BRPM | HEIGHT: 73 IN | WEIGHT: 222 LBS | HEART RATE: 68 BPM | TEMPERATURE: 98.6 F | BODY MASS INDEX: 29.42 KG/M2

## 2023-06-21 DIAGNOSIS — M86.9 OSTEOMYELITIS, UNSPECIFIED SITE, UNSPECIFIED TYPE (HCC): Primary | ICD-10-CM

## 2023-06-21 PROCEDURE — 3078F DIAST BP <80 MM HG: CPT | Performed by: INTERNAL MEDICINE

## 2023-06-21 PROCEDURE — 3074F SYST BP LT 130 MM HG: CPT | Performed by: INTERNAL MEDICINE

## 2023-06-21 PROCEDURE — 99214 OFFICE O/P EST MOD 30 MIN: CPT | Performed by: INTERNAL MEDICINE

## 2023-06-21 RX ORDER — LEVOFLOXACIN 750 MG/1
750 TABLET ORAL DAILY
Qty: 14 TABLET | Refills: 0 | Status: SHIPPED | OUTPATIENT
Start: 2023-06-21 | End: 2023-07-05

## 2023-06-21 NOTE — PROGRESS NOTES
[facility] -   Infectious diseases evaluation  admission date (Not on file)    reason for consultation:   Left foot infection    Impression :   Current:  Left foot wound with cellulitis, abscess, fifth MTP joint septic arthritis and osteomyelitis to the   5th metatarsal head and 5th proximal phalanx base. Status post fifth ray amputation 5/11/2023 group B streptococcus grew on culture  Peripheral vascular disease status post femoral bypass surgery    on the left 12/2022  History of drug abuse  Asymptomatic cholelithiasis    Recommendations   P.o. Levaquin for 2 more weeks  Follow C-reactive routine, sed rate BUN/creatinine and CBC. Follow-up in 2 weeks  Follow-up with Dr. Earnestine Travis tomorrow. History of Present Illness:   Initial history:  Mayur Isaacs is a 48y.o.-year-old male was hospitalized recently  for left foot infection/abscess with suspected 5th metatarsal head and 5th proximal phalanx base. The patient had history of peripheral vascular disease status post femoral bypass surgery on 12/2022 with postoperative nonhealing wound. He was treated with oral doxycycline initially with no significant improvement. The patient was started on oral Levaquin on 5/3/2023 with improvement  Tissue culture from 5/3/2023 grew Pseudomonas aeruginosa, Citrobacter and group B streptococcus  MRI of the foot reviewed suggestive of septic fifth MTP joint, abscess to the plantar aspect of the distal fifth metatarsal with osteomyelitis to the fifth metatarsal and proximal phalanx of the fifth toe. Status post fifth ray amputation 5/11/2023 group B streptococcus grew on culture  He was treated with IV antibiotics during his hospitalization was discharged on oral antibiotics that was completed.   Interval changes  He is complaining of foot pain today, surgical incision healing with no purulent drainage, less swelling and redness, denied fever or chills, no nausea

## 2023-06-22 ENCOUNTER — OFFICE VISIT (OUTPATIENT)
Dept: PODIATRY | Age: 53
End: 2023-06-22
Payer: COMMERCIAL

## 2023-06-22 VITALS — WEIGHT: 222 LBS | HEIGHT: 73 IN | BODY MASS INDEX: 29.42 KG/M2

## 2023-06-22 DIAGNOSIS — L08.9 TYPE 2 DIABETES MELLITUS WITH LEFT DIABETIC FOOT INFECTION (HCC): Primary | ICD-10-CM

## 2023-06-22 DIAGNOSIS — E11.51 DIABETES MELLITUS WITH PERIPHERAL VASCULAR DISEASE (HCC): ICD-10-CM

## 2023-06-22 DIAGNOSIS — E11.628 TYPE 2 DIABETES MELLITUS WITH LEFT DIABETIC FOOT INFECTION (HCC): Primary | ICD-10-CM

## 2023-06-22 DIAGNOSIS — Z89.422 HX OF AMPUTATION OF LESSER TOE, LEFT (HCC): ICD-10-CM

## 2023-06-22 PROCEDURE — 99213 OFFICE O/P EST LOW 20 MIN: CPT | Performed by: PODIATRIST

## 2023-06-22 RX ORDER — FUROSEMIDE 40 MG/1
TABLET ORAL
COMMUNITY
Start: 2023-06-15

## 2023-06-22 NOTE — PROGRESS NOTES
[facility] Podiatry  Post Op note  Chief Complaint   Patient presents with    Post-Op Check     Post op left foot    Diabetes     Last blood Sugar [address] Bertha Griffith is a 48y.o. year old male who is   post op from foot surgery. Amputation partial 5th ray left, done on 5/11/23 and delayed closure on 5/17/23 . Vital signs are stable. Pain level is 1. Patient denies N/V/F/C. Patient has been compliant with postoperative instructions. He has been putting weight on his foot   He did have more pain around his ankle the past day. He put on his own dressing. It appears to have created a pressure injury to his ankle. Pain better, when we removed the dressing. Review of Systems    Vascular: DP and PT pulses palpable 1/4, Right Foot and 1/4 on the Left Foot. CFT <3 seconds, Right Foot and <3 seconds on the Left Foot. Edema is absent,  Right Foot and presenton the Left Foot. Neurological:   Sensation absent  to light touch to level of digits, the left foot. Musculoskeletal:   Muscle strength is 5/5 on the Right Foot and 5/5 on the Left Foot. Structural deformities are present on the Right Foot and present on the Left Foot. Integument:  Warm, dry, supple the left foot. Incisions are healing well. Wound dehiscence is absent. Infection is absent. Incision healed  Pressure injury anterior ankle, not open, no erythema. Assesment : Post operative progress gradually improving. Diagnosis Orders   1. Type 2 diabetes mellitus with left diabetic foot infection ([address]        2. Diabetes mellitus with peripheral vascular disease ([address]        3. Hx of amputation of lesser toe, left ([address]              Plan: Pt was evaluated and examined. Patient was given personalized discharge instructions. Pt will follow up in 1 weeks or sooner if any problems arise. Diagnosis was discussed with the pt and all of their questions were answered in detail.  Proper foot hygiene and care was

## 2023-06-23 ENCOUNTER — TELEPHONE (OUTPATIENT)
Dept: PHARMACY | Facility: CLINIC | Age: 53
End: 2023-06-23

## 2023-07-04 RX ORDER — GABAPENTIN 300 MG/1
900 CAPSULE ORAL 3 TIMES DAILY
Qty: 270 CAPSULE | Refills: 3 | Status: SHIPPED | OUTPATIENT
Start: 2023-07-04 | End: 2023-08-03

## 2023-07-05 ENCOUNTER — OFFICE VISIT (OUTPATIENT)
Dept: INFECTIOUS DISEASES | Age: 53
End: 2023-07-05
Payer: COMMERCIAL

## 2023-07-05 VITALS
OXYGEN SATURATION: 98 % | RESPIRATION RATE: 16 BRPM | HEART RATE: 77 BPM | TEMPERATURE: 97.8 F | DIASTOLIC BLOOD PRESSURE: 78 MMHG | WEIGHT: 222 LBS | HEIGHT: 73 IN | SYSTOLIC BLOOD PRESSURE: 132 MMHG | BODY MASS INDEX: 29.42 KG/M2

## 2023-07-05 DIAGNOSIS — I96 GANGRENE OF LEFT FOOT (HCC): Primary | ICD-10-CM

## 2023-07-05 PROCEDURE — 3075F SYST BP GE 130 - 139MM HG: CPT | Performed by: INTERNAL MEDICINE

## 2023-07-05 PROCEDURE — 99214 OFFICE O/P EST MOD 30 MIN: CPT | Performed by: INTERNAL MEDICINE

## 2023-07-05 PROCEDURE — 3078F DIAST BP <80 MM HG: CPT | Performed by: INTERNAL MEDICINE

## 2023-07-05 NOTE — PROGRESS NOTES
[facility] -   Infectious diseases evaluation  admission date (Not on file)    reason for consultation:   Left foot infection    Impression :   Current:  Left foot wound with cellulitis, abscess, fifth MTP joint septic arthritis and osteomyelitis to the   5th metatarsal head and 5th proximal phalanx base. Status post fifth ray amputation 5/11/2023 group B streptococcus grew on culture  Peripheral vascular disease status post femoral bypass surgery    on the left 12/2022  History of drug abuse  Asymptomatic cholelithiasis    Recommendations   P.o. Levaquin will be completed in 1 to 2 days  Follow C-reactive routine, sed rate BUN/creatinine and CBC. Follow-up in 7 weeks  Follow-up with Dr. Radha Kirby           History of Present Illness:   Initial history:  Yolie Dupree is a 48y.o.-year-old male was hospitalized recently  for left foot infection/abscess with suspected 5th metatarsal head and 5th proximal phalanx base. The patient had history of peripheral vascular disease status post femoral bypass surgery on 12/2022 with postoperative nonhealing wound. He was treated with oral doxycycline initially with no significant improvement. The patient was started on oral Levaquin on 5/3/2023 with improvement  Tissue culture from 5/3/2023 grew Pseudomonas aeruginosa, Citrobacter and group B streptococcus  MRI of the foot reviewed suggestive of septic fifth MTP joint, abscess to the plantar aspect of the distal fifth metatarsal with osteomyelitis to the fifth metatarsal and proximal phalanx of the fifth toe. Status post fifth ray amputation 5/11/2023 group B streptococcus grew on culture  He was treated with IV antibiotics during his hospitalization was discharged on oral antibiotics that was completed.   Interval changes  He is feeling better, left foot wound healed, no redness, no fever or chills, mild intermittent pain, no other complaints  He is tolerating oral

## 2023-07-06 ENCOUNTER — OFFICE VISIT (OUTPATIENT)
Dept: PODIATRY | Age: 53
End: 2023-07-06
Payer: COMMERCIAL

## 2023-07-06 VITALS — HEIGHT: 73 IN | BODY MASS INDEX: 29.42 KG/M2 | WEIGHT: 222 LBS

## 2023-07-06 DIAGNOSIS — E11.51 DIABETES MELLITUS WITH PERIPHERAL VASCULAR DISEASE (HCC): ICD-10-CM

## 2023-07-06 DIAGNOSIS — Z89.422 HX OF AMPUTATION OF LESSER TOE, LEFT (HCC): ICD-10-CM

## 2023-07-06 DIAGNOSIS — L08.9 TYPE 2 DIABETES MELLITUS WITH LEFT DIABETIC FOOT INFECTION (HCC): Primary | ICD-10-CM

## 2023-07-06 DIAGNOSIS — E11.628 TYPE 2 DIABETES MELLITUS WITH LEFT DIABETIC FOOT INFECTION (HCC): Primary | ICD-10-CM

## 2023-07-06 PROCEDURE — 99213 OFFICE O/P EST LOW 20 MIN: CPT | Performed by: PODIATRIST

## 2023-07-06 RX ORDER — INSULIN LISPRO 100 [IU]/ML
INJECTION, SOLUTION INTRAVENOUS; SUBCUTANEOUS
COMMUNITY
Start: 2023-07-06

## 2023-07-06 NOTE — PROGRESS NOTES
[facility] Podiatry  Post Op note  Chief Complaint   Patient presents with    Post-Op Check     Left foot    Diabetes     Last blood sugar [address] Tanika Ingram is a 48y.o. year old male who is   post op from foot surgery. Amputation partial 5th ray left, done on 5/11/23 and delayed closure on 5/17/23 . Vital signs are stable. Pain level is 0 Patient denies N/V/F/C. Patient has been compliant with postoperative instructions. Review of Systems    Vascular: DP and PT pulses palpable 1/4, Right Foot and 1/4 on the Left Foot. CFT <3 seconds, Right Foot and <3 seconds on the Left Foot. Edema is absent,  Right Foot and minimal on the Left Foot. Neurological:   Sensation absent  to light touch to level of digits, the left foot. Musculoskeletal:   Muscle strength is 5/5 on the Right Foot and 5/5 on the Left Foot. Structural deformities are present on the Right Foot and present on the Left Foot. Integument:  Warm, dry, supple the left foot. Incisions are healing well. Wound dehiscence is absent. Infection is absent. Incision healed  Left ankle healed             Assesment : Post operative progress gradually improving. Diagnosis Orders   1. Type 2 diabetes mellitus with left diabetic foot infection ([address])        2. Diabetes mellitus with peripheral vascular disease ([address])        3. Hx of amputation of lesser toe, left ([address])              Plan: Pt was evaluated and examined. Patient was given personalized discharge instructions. Pt will follow up in 4 weeks or sooner if any problems arise. Diagnosis was discussed with the pt and all of their questions were answered in detail. Proper foot hygiene and care was discussed with the pt. Patient to check feet daily and contact the office with any questions/problems/concerns. Other comorbidity noted and will be managed by PCP.      Incision and foot cleaned, no open areas    Band aid daily for protection  Clean sock  Monitor foot  , good

## 2023-07-27 ENCOUNTER — OFFICE VISIT (OUTPATIENT)
Dept: VASCULAR SURGERY | Age: 53
End: 2023-07-27
Payer: COMMERCIAL

## 2023-07-27 VITALS
DIASTOLIC BLOOD PRESSURE: 90 MMHG | BODY MASS INDEX: 32.35 KG/M2 | RESPIRATION RATE: 20 BRPM | OXYGEN SATURATION: 95 % | HEIGHT: 73 IN | TEMPERATURE: 97.2 F | SYSTOLIC BLOOD PRESSURE: 139 MMHG | HEART RATE: 78 BPM | WEIGHT: 244.1 LBS

## 2023-07-27 DIAGNOSIS — I73.9 PAD (PERIPHERAL ARTERY DISEASE) (HCC): Primary | ICD-10-CM

## 2023-07-27 DIAGNOSIS — Z95.828 S/P FEMORAL-POPLITEAL BYPASS SURGERY: ICD-10-CM

## 2023-07-27 PROCEDURE — 99214 OFFICE O/P EST MOD 30 MIN: CPT | Performed by: SURGERY

## 2023-07-27 PROCEDURE — 3078F DIAST BP <80 MM HG: CPT | Performed by: SURGERY

## 2023-07-27 PROCEDURE — 3074F SYST BP LT 130 MM HG: CPT | Performed by: SURGERY

## 2023-07-27 NOTE — PROGRESS NOTES
Imaging/Labs:     CTA may 2023 reveals patent femoral popliteal bypass with 3 vessel runoff    Assessment and Plan:     Peripheral arterial disease, s/p femoral popliteal bypass, severe neuropathy  Optimal medical therapy with aspirin and statins  Ok to stop anticoagulation for now, do not suspect he had embolic event, likely buildup of atherosclerotic disease  Walk, walk, walk to improve overall cardiovascular health  Daily foot care  Follow up in 6 months with PVRs and arterial duplex of bypass graft  Sooner if symptoms of claudication or rest pain return or develops any new wounds that are not improving    Electronically signed by Kaitlyn Mercado MD on 7/27/23 at 3:20 PM EDT      [address] [facility] Se: [phone]  C: [phone]  Email: [email or web address]

## 2023-08-03 ENCOUNTER — OFFICE VISIT (OUTPATIENT)
Dept: PODIATRY | Age: 53
End: 2023-08-03
Payer: COMMERCIAL

## 2023-08-03 VITALS — BODY MASS INDEX: 31.81 KG/M2 | WEIGHT: 240 LBS | HEIGHT: 73 IN

## 2023-08-03 DIAGNOSIS — Z89.422 HX OF AMPUTATION OF LESSER TOE, LEFT (HCC): ICD-10-CM

## 2023-08-03 DIAGNOSIS — L08.9 TYPE 2 DIABETES MELLITUS WITH LEFT DIABETIC FOOT INFECTION (HCC): Primary | ICD-10-CM

## 2023-08-03 DIAGNOSIS — E11.628 TYPE 2 DIABETES MELLITUS WITH LEFT DIABETIC FOOT INFECTION (HCC): Primary | ICD-10-CM

## 2023-08-03 DIAGNOSIS — E11.51 DIABETES MELLITUS WITH PERIPHERAL VASCULAR DISEASE (HCC): ICD-10-CM

## 2023-08-03 PROCEDURE — 99213 OFFICE O/P EST LOW 20 MIN: CPT | Performed by: PODIATRIST

## 2023-08-03 NOTE — PROGRESS NOTES
[facility]  Return Patient  Chief Complaint   Patient presents with    Post-Op Check     Left foot    Diabetes     Last blood sugar 91       Gagan Larson is a 48y.o. year old male who is   post op from foot surgery. Amputation partial 5th ray left, done on 5/11/23 and delayed closure on 5/17/23 . Vital signs are stable. Pain level is 0 Patient denies N/V/F/C. Patient has been compliant with postoperative instructions. On gabapentin and doing well. Able to wear a regular shoe, monitoring feet daily. Review of Systems    Vascular: DP and PT pulses palpable 1/4, Right Foot and 1/4 on the Left Foot. CFT <3 seconds, Right Foot and <3 seconds on the Left Foot. Edema is absent,  Right Foot and absent on the Left Foot. Neurological:   Sensation absent  to light touch to level of digits, the left foot. Musculoskeletal:   Muscle strength is 5/5 on the Right Foot and 5/5 on the Left Foot. Structural deformities are present on the Right Foot and present on the Left Foot. Integument:  Warm, dry, supple the left foot. Incisions are healing well. Wound dehiscence is absent. Infection is absent. Incision healed  Left ankle healed             Assesment :    Diagnosis Orders   1. Type 2 diabetes mellitus with left diabetic foot infection ([address])        2. Diabetes mellitus with peripheral vascular disease ([address])        3. Hx of amputation of lesser toe, left ([address])              Plan: Pt was evaluated and examined. Patient was given personalized discharge instructions. Pt will follow up in 3 months or sooner if any problems arise. Diagnosis was discussed with the pt and all of their questions were answered in detail. Proper foot hygiene and care was discussed with the pt. Patient to check feet daily and contact the office with any questions/problems/concerns. Other comorbidity noted and will be managed by PCP.      Incision and foot cleaned, no open areas    Clean sock  Monitor foot

## 2023-08-06 ASSESSMENT — ENCOUNTER SYMPTOMS
ALLERGIC/IMMUNOLOGIC NEGATIVE: 1
BACK PAIN: 1
ABDOMINAL PAIN: 0
CHEST TIGHTNESS: 0
COLOR CHANGE: 0
SHORTNESS OF BREATH: 0

## 2023-10-02 RX ORDER — GABAPENTIN 300 MG/1
900 CAPSULE ORAL 3 TIMES DAILY
Qty: 270 CAPSULE | Refills: 3 | Status: SHIPPED | OUTPATIENT
Start: 2023-10-02 | End: 2023-11-01

## 2024-01-18 ENCOUNTER — HOSPITAL ENCOUNTER (OUTPATIENT)
Dept: VASCULAR LAB | Age: 54
Discharge: HOME | End: 2024-01-20
Attending: SURGERY
Payer: COMMERCIAL

## 2024-01-18 DIAGNOSIS — I73.9 PAD (PERIPHERAL ARTERY DISEASE) (HCC): ICD-10-CM

## 2024-01-18 DIAGNOSIS — Z95.828 S/P FEMORAL-POPLITEAL BYPASS SURGERY: ICD-10-CM

## 2024-01-18 PROCEDURE — 93926 LOWER EXTREMITY STUDY: CPT

## 2024-01-18 PROCEDURE — 93923 UPR/LXTR ART STDY 3+ LVLS: CPT

## 2024-01-19 LAB
Lab: 41.7 CM
Lab: 51 CM
VAS LEFT ABI: 0.85
VAS LEFT ARM BP: 158 MMHG
VAS LEFT ARTERIAL PROX ANASTOMOSIS EDV: 0 CM/S
VAS LEFT ARTERIAL PROX ANASTOMOSIS PSV: 54.9 CM/S
VAS LEFT CFA PROX PSV: 145.2 CM/S
VAS LEFT DIST OUTFLOW EDV: 13.1 CM/S
VAS LEFT DIST OUTFLOW PSV: 74.7 CM/S
VAS LEFT DORSALIS PEDIS BP: 114 MMHG
VAS LEFT INFLOW ARTERY EDV: 19.4 CM/S
VAS LEFT INFLOW ARTERY PSV: 101.6 CM/S
VAS LEFT OUTFLOW VESSEL EDV: 0 CM/S
VAS LEFT OUTFLOW VESSEL PSV: 74.7 CM/S
VAS LEFT PFA PROX PSV: 108.2 CM/S
VAS LEFT PTA BP: 134 MMHG
VAS LEFT PTA DIST AP DIAM: 54.6 CM
VAS LEFT SFA DIST PSV: 126 CM/S
VAS LEFT SFA DIST VEL RATIO: 1.26
VAS LEFT SFA MID PSV: 100.1 CM/S
VAS LEFT SFA MID VEL RATIO: 0.73
VAS LEFT SFA PROX PSV: 136.9 CM/S
VAS LEFT SFA PROX VEL RATIO: 0.94
VAS LEFT TBI: 0.51
VAS LEFT TOE PRESSURE: 80 MMHG
VAS LEFT VENOUS DIST ANASTOMOSIS EDV: 0 CM/S
VAS LEFT VENOUS DIST ANASTOMOSIS PSV: 62.6 CM/S
VAS RIGHT ABI: 1.13
VAS RIGHT ARM BP: 151 MMHG
VAS RIGHT DORSALIS PEDIS BP: 154 MMHG
VAS RIGHT PTA BP: 178 MMHG
VAS RIGHT TBI: 0.77
VAS RIGHT TOE PRESSURE: 121 MMHG

## 2024-01-25 ENCOUNTER — OFFICE VISIT (OUTPATIENT)
Dept: VASCULAR SURGERY | Age: 54
End: 2024-01-25
Payer: COMMERCIAL

## 2024-01-25 VITALS
SYSTOLIC BLOOD PRESSURE: 145 MMHG | WEIGHT: 258 LBS | RESPIRATION RATE: 18 BRPM | HEART RATE: 72 BPM | DIASTOLIC BLOOD PRESSURE: 85 MMHG | HEIGHT: 73 IN | BODY MASS INDEX: 34.19 KG/M2 | OXYGEN SATURATION: 96 %

## 2024-01-25 DIAGNOSIS — I73.9 PAD (PERIPHERAL ARTERY DISEASE) (HCC): Primary | ICD-10-CM

## 2024-01-25 DIAGNOSIS — Z95.828 S/P FEMORAL-POPLITEAL BYPASS SURGERY: ICD-10-CM

## 2024-01-25 DIAGNOSIS — G62.9 NEUROPATHY: ICD-10-CM

## 2024-01-25 PROCEDURE — 3077F SYST BP >= 140 MM HG: CPT | Performed by: SURGERY

## 2024-01-25 PROCEDURE — 99214 OFFICE O/P EST MOD 30 MIN: CPT | Performed by: SURGERY

## 2024-01-25 PROCEDURE — 3079F DIAST BP 80-89 MM HG: CPT | Performed by: SURGERY

## 2024-01-25 NOTE — PROGRESS NOTES
2023 March 9, 2023 April 20, 2023               Blistering and swelling after stepping on something     July 27, 2023 January 25, 2024, looking like a          Imaging/Labs:     Duplex reviewed reveals patent femoral popliteal bypass, essentially normal PVRs and DONTA in both lower extremities        Assessment and Plan:     Peripheral arterial disease, neuropathy, s/p femoral popliteal bypass  Optimal medical therapy with aspirin and statins  Walk, walk, walk to improve overall cardiovascular health  6 month surveillance with PVRs and graft duplex, sooner if symptoms return or any wounds develop  We discussed cutting back neurontin to 600 mg TID and then potentially twice a day over the next few months    Electronically signed by Geeta Suh MD on 1/25/24 at 3:04 PM [facility]  O: [phone]  C: [phone]  Email: [email or web address]

## 2024-02-05 PROBLEM — G62.9 NEUROPATHY: Status: ACTIVE | Noted: 2024-02-05

## 2024-02-05 ASSESSMENT — ENCOUNTER SYMPTOMS
ABDOMINAL PAIN: 0
SHORTNESS OF BREATH: 0
CHEST TIGHTNESS: 0
COLOR CHANGE: 0
BACK PAIN: 1
ALLERGIC/IMMUNOLOGIC NEGATIVE: 1

## 2024-02-21 RX ORDER — GABAPENTIN 300 MG/1
900 CAPSULE ORAL 3 TIMES DAILY
Qty: 270 CAPSULE | Refills: 3 | Status: SHIPPED | OUTPATIENT
Start: 2024-02-21 | End: 2024-06-20

## 2024-06-14 ENCOUNTER — HOSPITAL ENCOUNTER (OUTPATIENT)
Age: 54
Discharge: HOME | End: 2024-06-14
Payer: COMMERCIAL

## 2024-06-14 LAB
ALBUMIN SERPL-MCNC: 4.1 G/DL (ref 3.5–5.2)
ALBUMIN SERPL-MCNC: 4.2 G/DL (ref 3.5–5.2)
ALP SERPL-CCNC: 102 U/L (ref 40–129)
ALP SERPL-CCNC: 103 U/L (ref 40–129)
ALT SERPL-CCNC: 40 U/L (ref 5–41)
ALT SERPL-CCNC: 43 U/L (ref 5–41)
ANION GAP SERPL CALCULATED.3IONS-SCNC: 11 MMOL/L (ref 9–17)
ANION GAP SERPL CALCULATED.3IONS-SCNC: 9 MMOL/L (ref 9–17)
AST SERPL-CCNC: 47 U/L
AST SERPL-CCNC: 47 U/L
BASOPHILS # BLD: 0.1 K/UL (ref 0–0.2)
BASOPHILS NFR BLD: 1 % (ref 0–2)
BILIRUB SERPL-MCNC: 0.4 MG/DL (ref 0.3–1.2)
BILIRUB SERPL-MCNC: 0.4 MG/DL (ref 0.3–1.2)
BUN SERPL-MCNC: 19 MG/DL (ref 6–20)
BUN SERPL-MCNC: 19 MG/DL (ref 6–20)
CALCIUM SERPL-MCNC: 8.9 MG/DL (ref 8.6–10.4)
CALCIUM SERPL-MCNC: 9 MG/DL (ref 8.6–10.4)
CHLORIDE SERPL-SCNC: 101 MMOL/L (ref 98–107)
CHLORIDE SERPL-SCNC: 101 MMOL/L (ref 98–107)
CHOLEST SERPL-MCNC: 104 MG/DL
CHOLESTEROL/HDL RATIO: 2.7
CO2 SERPL-SCNC: 25 MMOL/L (ref 20–31)
CO2 SERPL-SCNC: 28 MMOL/L (ref 20–31)
CREAT SERPL-MCNC: 0.6 MG/DL (ref 0.7–1.2)
CREAT SERPL-MCNC: 0.6 MG/DL (ref 0.7–1.2)
EOSINOPHIL # BLD: 0.2 K/UL (ref 0–0.4)
EOSINOPHILS RELATIVE PERCENT: 3 % (ref 0–4)
ERYTHROCYTE [DISTWIDTH] IN BLOOD BY AUTOMATED COUNT: 13.1 % (ref 11.5–14.9)
GFR, ESTIMATED: >90 ML/MIN/1.73M2
GFR, ESTIMATED: >90 ML/MIN/1.73M2
GLUCOSE SERPL-MCNC: 141 MG/DL (ref 70–99)
HBV SURFACE AG SERPL QL IA: NONREACTIVE
HCT VFR BLD AUTO: 42.8 % (ref 41–53)
HCV AB SERPL QL IA: NONREACTIVE
HDLC SERPL-MCNC: 38 MG/DL
HGB BLD-MCNC: 14.6 G/DL (ref 13.5–17.5)
HIV 1+2 AB+HIV1 P24 AG SERPL QL IA: NONREACTIVE
LDLC SERPL CALC-MCNC: 39 MG/DL (ref 0–130)
LYMPHOCYTES NFR BLD: 2.2 K/UL (ref 1–4.8)
LYMPHOCYTES RELATIVE PERCENT: 27 % (ref 24–44)
MCH RBC QN AUTO: 32.1 PG (ref 26–34)
MCHC RBC AUTO-ENTMCNC: 34 G/DL (ref 31–37)
MCV RBC AUTO: 94.4 FL (ref 80–100)
MONOCYTES NFR BLD: 0.6 K/UL (ref 0.1–1.3)
MONOCYTES NFR BLD: 8 % (ref 1–7)
NEUTROPHILS NFR BLD: 61 % (ref 36–66)
PLATELET # BLD AUTO: 213 K/UL (ref 150–450)
PMV BLD AUTO: 7.3 FL (ref 6–12)
POTASSIUM SERPL-SCNC: 4.4 MMOL/L (ref 3.7–5.3)
POTASSIUM SERPL-SCNC: 4.5 MMOL/L (ref 3.7–5.3)
PROT SERPL-MCNC: 7.7 G/DL (ref 6.4–8.3)
PROT SERPL-MCNC: 7.7 G/DL (ref 6.4–8.3)
PSA SERPL-MCNC: 0.1 NG/ML (ref 0–4)
RBC # BLD AUTO: 4.53 M/UL (ref 4.5–5.9)
SODIUM SERPL-SCNC: 137 MMOL/L (ref 135–144)
SODIUM SERPL-SCNC: 138 MMOL/L (ref 135–144)
T PALLIDUM AB SER QL IA: NONREACTIVE
TRIGL SERPL-MCNC: 137 MG/DL
TSH SERPL DL<=0.05 MIU/L-ACNC: 0.89 UIU/ML (ref 0.3–5)
WBC OTHER # BLD: 8.2 K/UL (ref 3.5–11)

## 2024-06-14 PROCEDURE — 86803 HEPATITIS C AB TEST: CPT

## 2024-06-14 PROCEDURE — G0103 PSA SCREENING: HCPCS

## 2024-06-14 PROCEDURE — 36415 COLL VENOUS BLD VENIPUNCTURE: CPT

## 2024-06-14 PROCEDURE — 80061 LIPID PANEL: CPT

## 2024-06-14 PROCEDURE — 87389 HIV-1 AG W/HIV-1&-2 AB AG IA: CPT

## 2024-06-14 PROCEDURE — 80053 COMPREHEN METABOLIC PANEL: CPT

## 2024-06-14 PROCEDURE — 86480 TB TEST CELL IMMUN MEASURE: CPT

## 2024-06-14 PROCEDURE — 85025 COMPLETE CBC W/AUTO DIFF WBC: CPT

## 2024-06-14 PROCEDURE — 84443 ASSAY THYROID STIM HORMONE: CPT

## 2024-06-14 PROCEDURE — 87340 HEPATITIS B SURFACE AG IA: CPT

## 2024-06-14 PROCEDURE — 86780 TREPONEMA PALLIDUM: CPT

## 2024-06-18 LAB
QUANTI TB GOLD PLUS: NEGATIVE
QUANTI TB1 MINUS NIL: 0.1 IU/ML
QUANTI TB2 MINUS NIL: 0.12 IU/ML
QUANTIFERON MITOGEN: 9.96 IU/ML
QUANTIFERON NIL: 0.04 IU/ML

## 2024-07-11 ENCOUNTER — OFFICE VISIT (OUTPATIENT)
Dept: PODIATRY | Age: 54
End: 2024-07-11
Payer: COMMERCIAL

## 2024-07-11 VITALS — BODY MASS INDEX: 31.15 KG/M2 | WEIGHT: 230 LBS | HEIGHT: 72 IN

## 2024-07-11 DIAGNOSIS — L97.512 ULCER OF TOE, RIGHT, WITH FAT LAYER EXPOSED (HCC): ICD-10-CM

## 2024-07-11 DIAGNOSIS — E11.628 TYPE 2 DIABETES MELLITUS WITH LEFT DIABETIC FOOT INFECTION (HCC): ICD-10-CM

## 2024-07-11 DIAGNOSIS — L97.509 TYPE 2 DIABETES MELLITUS WITH DIABETIC TOE ULCER (HCC): ICD-10-CM

## 2024-07-11 DIAGNOSIS — E11.51 DIABETES MELLITUS WITH PERIPHERAL VASCULAR DISEASE (HCC): ICD-10-CM

## 2024-07-11 DIAGNOSIS — Z89.422 HX OF AMPUTATION OF LESSER TOE, LEFT (HCC): Primary | ICD-10-CM

## 2024-07-11 DIAGNOSIS — L08.9 TYPE 2 DIABETES MELLITUS WITH LEFT DIABETIC FOOT INFECTION (HCC): ICD-10-CM

## 2024-07-11 DIAGNOSIS — E11.621 TYPE 2 DIABETES MELLITUS WITH DIABETIC TOE ULCER (HCC): ICD-10-CM

## 2024-07-11 PROCEDURE — 99213 OFFICE O/P EST LOW 20 MIN: CPT | Performed by: PODIATRIST

## 2024-07-11 PROCEDURE — 11042 DBRDMT SUBQ TIS 1ST 20SQCM/<: CPT | Performed by: PODIATRIST

## 2024-07-11 NOTE — PROGRESS NOTES
4. Ulcer of toe, right, with fat layer exposed (HCC)    5. 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  Current condition and treatment options discussed in detail.    Buster Shabazz Age:  54 y.o.  Gender:  male   :  1970  Today's Date:  2024    Pt was evaluated and examined. Patient was given personalized discharge instructions.  Pt will follow up in 2 weeks or sooner if any problems arise. Diagnosis was discussed with the pt and all of their questions were answered in detail. Proper foot hygiene and care was discussed with the pt.  Patient to check feet daily and contact the office with any questions/problems/concerns.  Other comorbidity noted and will be managed by PCP.        All labs were reviewed and all imagining including the above findings were reviewed PRIOR to and during the patients arrival and with the patient today.  Previous patient encounter was reviewed.  Encounters from the patients other medical providers were reviewed and noted.      Time was spent educating the patient and their families/caregivers on proper care of the feet and ankles.  All the above diagnosis were addressed at todays visit and all questions were answered.  A total of 35 minutes was spent with this patients encounter which included charting after the patients visit     Procedure:      With the patient in supine position, Lidocaine soaked gauze was applied per physician order at beginning of wound evaluation.  It was subsequently removed.    Using a curette, tissue nippers, and Pick-up, the wound was debrided down to and included the removal of the following tissue:     [] epidermis, [] dermis, [x]  subcutaneous tissue,  [] muscle/fascia tissue,   and/or  [] bone     Also debrided was all  [x] fibrin, [x] biofilm, [x] slough,  [] necrotic,  [] hypergranulation tissue, and/or  [] hyperkeratotic tissue.    Wound was copiously irrigated with normal

## 2024-07-29 ENCOUNTER — HOSPITAL ENCOUNTER (OUTPATIENT)
Dept: VASCULAR LAB | Age: 54
Discharge: HOME | End: 2024-07-31
Attending: SURGERY
Payer: COMMERCIAL

## 2024-07-29 DIAGNOSIS — Z95.828 S/P FEMORAL-POPLITEAL BYPASS SURGERY: ICD-10-CM

## 2024-07-29 DIAGNOSIS — I73.9 PAD (PERIPHERAL ARTERY DISEASE) (HCC): ICD-10-CM

## 2024-07-29 PROCEDURE — 93926 LOWER EXTREMITY STUDY: CPT

## 2024-07-29 PROCEDURE — 93923 UPR/LXTR ART STDY 3+ LVLS: CPT

## 2024-07-30 LAB
VAS LEFT ABI: 0.78
VAS LEFT ARM BP: 136 MMHG
VAS LEFT ARTERIAL PROX ANASTOMOSIS EDV: 0 CM/S
VAS LEFT ARTERIAL PROX ANASTOMOSIS PSV: 54.9 CM/S
VAS LEFT ATA DIST PSV: 37.8 CM/S
VAS LEFT CFA PROX PSV: 99.4 CM/S
VAS LEFT DORSALIS PEDIS BP: 101 MMHG
VAS LEFT INFLOW ARTERY EDV: 0 CM/S
VAS LEFT INFLOW ARTERY PSV: 52.8 CM/S
VAS LEFT MID OUTFLOW EDV: 0 CM/S
VAS LEFT MID OUTFLOW PSV: 50.5 CM/S
VAS LEFT OUTFLOW VESSEL EDV: 0 CM/S
VAS LEFT OUTFLOW VESSEL PSV: 42.3 CM/S
VAS LEFT PERONEAL DIST PSV: 31.9 CM/S
VAS LEFT PFA PROX PSV: 96.9 CM/S
VAS LEFT POP A PROX PSV: 374 CM/S
VAS LEFT POP A PROX VEL RATIO: 4.45
VAS LEFT PROX OUTFLOW EDV: 0 CM/S
VAS LEFT PROX OUTFLOW PSV: 53.3 CM/S
VAS LEFT PTA BP: 111 MMHG
VAS LEFT PTA DIST PSV: 43.1 CM/S
VAS LEFT SFA DIST PSV: 84 CM/S
VAS LEFT SFA DIST VEL RATIO: 0.99
VAS LEFT SFA MID PSV: 85.1 CM/S
VAS LEFT SFA MID VEL RATIO: 0.82
VAS LEFT SFA PROX PSV: 104 CM/S
VAS LEFT SFA PROX VEL RATIO: 1.05
VAS LEFT TBI: 0.49
VAS LEFT TOE PRESSURE: 70 MMHG
VAS LEFT VENOUS DIST ANASTOMOSIS EDV: 0 CM/S
VAS LEFT VENOUS DIST ANASTOMOSIS PSV: 47.2 CM/S
VAS RIGHT ABI: 1.08
VAS RIGHT ARM BP: 142 MMHG
VAS RIGHT DORSALIS PEDIS BP: 147 MMHG
VAS RIGHT PTA BP: 154 MMHG
VAS RIGHT TBI: 0.71
VAS RIGHT TOE PRESSURE: 101 MMHG

## 2024-07-30 PROCEDURE — 93923 UPR/LXTR ART STDY 3+ LVLS: CPT | Performed by: STUDENT IN AN ORGANIZED HEALTH CARE EDUCATION/TRAINING PROGRAM

## 2024-07-30 PROCEDURE — 93926 LOWER EXTREMITY STUDY: CPT | Performed by: STUDENT IN AN ORGANIZED HEALTH CARE EDUCATION/TRAINING PROGRAM

## 2024-08-08 ENCOUNTER — OFFICE VISIT (OUTPATIENT)
Dept: VASCULAR SURGERY | Age: 54
End: 2024-08-08
Payer: COMMERCIAL

## 2024-08-08 VITALS
HEIGHT: 72 IN | RESPIRATION RATE: 18 BRPM | BODY MASS INDEX: 33.59 KG/M2 | OXYGEN SATURATION: 98 % | HEART RATE: 65 BPM | SYSTOLIC BLOOD PRESSURE: 160 MMHG | TEMPERATURE: 98.8 F | WEIGHT: 248 LBS | DIASTOLIC BLOOD PRESSURE: 90 MMHG

## 2024-08-08 DIAGNOSIS — Z95.828 S/P FEMORAL-POPLITEAL BYPASS SURGERY: ICD-10-CM

## 2024-08-08 DIAGNOSIS — I73.9 PAD (PERIPHERAL ARTERY DISEASE) (HCC): Primary | ICD-10-CM

## 2024-08-08 PROCEDURE — 99213 OFFICE O/P EST LOW 20 MIN: CPT | Performed by: SURGERY

## 2024-08-08 PROCEDURE — 3077F SYST BP >= 140 MM HG: CPT | Performed by: SURGERY

## 2024-08-08 PROCEDURE — 3080F DIAST BP >= 90 MM HG: CPT | Performed by: SURGERY

## 2024-08-08 RX ORDER — GABAPENTIN 300 MG/1
300 CAPSULE ORAL 3 TIMES DAILY
Qty: 270 CAPSULE | Refills: 3 | Status: SHIPPED | OUTPATIENT
Start: 2024-08-08 | End: 2024-08-08

## 2024-08-08 RX ORDER — GABAPENTIN 300 MG/1
900 CAPSULE ORAL 3 TIMES DAILY
Qty: 90 CAPSULE | Refills: 3 | Status: SHIPPED | OUTPATIENT
Start: 2024-08-08 | End: 2024-08-12

## 2024-08-09 ENCOUNTER — TELEPHONE (OUTPATIENT)
Dept: VASCULAR SURGERY | Age: 54
End: 2024-08-09

## 2024-08-09 NOTE — TELEPHONE ENCOUNTER
Patients pharmacy called stating the way you wrote the gabapentin only gives him 10 days, can you change the prescription?

## 2024-08-12 ENCOUNTER — TELEPHONE (OUTPATIENT)
Age: 54
End: 2024-08-12

## 2024-08-12 RX ORDER — GABAPENTIN 800 MG/1
800 TABLET ORAL 3 TIMES DAILY
Qty: 270 TABLET | Refills: 1 | Status: SHIPPED | OUTPATIENT
Start: 2024-08-12 | End: 2025-02-08

## 2024-08-13 RX ORDER — GABAPENTIN 300 MG/1
900 CAPSULE ORAL 3 TIMES DAILY
Qty: 90 CAPSULE | Refills: 3 | OUTPATIENT
Start: 2024-08-13 | End: 2024-09-12

## 2024-08-20 ENCOUNTER — TELEPHONE (OUTPATIENT)
Dept: PODIATRY | Age: 54
End: 2024-08-20

## 2024-08-21 ENCOUNTER — OFFICE VISIT (OUTPATIENT)
Dept: PODIATRY | Age: 54
End: 2024-08-21
Payer: COMMERCIAL

## 2024-08-21 ENCOUNTER — HOSPITAL ENCOUNTER (OUTPATIENT)
Age: 54
Setting detail: SPECIMEN
Discharge: HOME | End: 2024-08-21

## 2024-08-21 VITALS — HEIGHT: 72 IN | BODY MASS INDEX: 33.59 KG/M2 | WEIGHT: 248 LBS

## 2024-08-21 DIAGNOSIS — E11.621 TYPE 2 DIABETES MELLITUS WITH DIABETIC TOE ULCER (HCC): ICD-10-CM

## 2024-08-21 DIAGNOSIS — L97.509 TYPE 2 DIABETES MELLITUS WITH DIABETIC TOE ULCER (HCC): ICD-10-CM

## 2024-08-21 DIAGNOSIS — E11.51 DIABETES MELLITUS WITH PERIPHERAL VASCULAR DISEASE (HCC): ICD-10-CM

## 2024-08-21 DIAGNOSIS — L97.513 ULCER OF TOE, RIGHT, WITH NECROSIS OF MUSCLE (HCC): Primary | ICD-10-CM

## 2024-08-21 DIAGNOSIS — L97.513 ULCER OF TOE, RIGHT, WITH NECROSIS OF MUSCLE (HCC): ICD-10-CM

## 2024-08-21 DIAGNOSIS — L03.031 CELLULITIS OF FOURTH TOE, RIGHT: ICD-10-CM

## 2024-08-21 DIAGNOSIS — Z89.422 HX OF AMPUTATION OF LESSER TOE, LEFT (HCC): ICD-10-CM

## 2024-08-21 DIAGNOSIS — L08.9 TYPE 2 DIABETES MELLITUS WITH LEFT DIABETIC FOOT INFECTION (HCC): ICD-10-CM

## 2024-08-21 DIAGNOSIS — E11.628 TYPE 2 DIABETES MELLITUS WITH LEFT DIABETIC FOOT INFECTION (HCC): ICD-10-CM

## 2024-08-21 PROCEDURE — 11043 DBRDMT MUSC&/FSCA 1ST 20/<: CPT | Performed by: PODIATRIST

## 2024-08-21 PROCEDURE — 99214 OFFICE O/P EST MOD 30 MIN: CPT | Performed by: PODIATRIST

## 2024-08-21 RX ORDER — CIPROFLOXACIN 500 MG/1
500 TABLET, FILM COATED ORAL 2 TIMES DAILY
Qty: 28 TABLET | Refills: 0 | Status: SHIPPED | OUTPATIENT
Start: 2024-08-21

## 2024-08-21 RX ORDER — CLINDAMYCIN HYDROCHLORIDE 300 MG/1
300 CAPSULE ORAL 4 TIMES DAILY
Qty: 40 CAPSULE | Refills: 0 | Status: SHIPPED | OUTPATIENT
Start: 2024-08-21 | End: 2024-08-31

## 2024-08-21 NOTE — PROGRESS NOTES
52.5 mLs by mouth daily., Disp: , Rfl:     Insulin Pen Needle 32G X 4 MM MISC, 1 each by Does not apply route daily, Disp: 100 each, Rfl: 3    glucose blood VI test strips (ASCENSIA AUTODISC VI;ONE TOUCH ULTRA TEST VI) strip, 1 each by In Vitro route 2 times daily As needed., Disp: , Rfl:     Lancets MISC, 1 each by Does not apply route 2 times daily, Disp: , Rfl:     Review of Systems    Objective:       Physical Exam:  Ht 1.829 m (6')   Wt 112.5 kg (248 lb)   BMI 33.63 kg/m²     Vascular:  DP/PT pulses palpable 2/4, Right.    Biphasic/triphasic right side  CFT <3 seconds to digits 1-5, Right .   Hair growth absent to level of digits, Right.  Edema absent, Right.  Erythema absent, Right.       Wound #:   1    diabetic foot ulcer   right foot lateral 4th pipj    Wound measurements:  #1  5 mm by 3 mm  by   2 mm         Wound Depth: subcutaneous., muscle and fascia  Deeper today     Drainage:    minimal Type: serous    Wound Bed status:   Granulation Tissue: 30%   Necrotic 70%  Type: slough  Epithelialization 0%   Hypergranular 0%   Periwound hyperkeratosis:  present  Erythema present  Odor:no  Maceration:yes  Undermining/tract/tunneling: yes  Culture taken:   yes, 24        Assessment:     Assessment: Patient is a 54 y.o. male with:  1. Ulcer of toe, right, with necrosis of muscle (HCC)    2. Type 2 diabetes mellitus with left diabetic foot infection (HCC)    3. Diabetes mellitus with peripheral vascular disease (HCC)    4. Hx of amputation of lesser toe, left (HCC)    5. 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 Cellulitis of fourth toe, right            Plan:     Pt examined and evaluated.  Current condition and treatment options discussed in detail.    Buster Shabazz Age:  54 y.o.  Gender:  male   :  1970  Today's Date:  2024    Pt was evaluated and examined. Patient was given personalized discharge instructions.  Pt will follow up in 1 weeks or sooner if any problems

## 2024-08-23 ENCOUNTER — TELEPHONE (OUTPATIENT)
Dept: PODIATRY | Age: 54
End: 2024-08-23

## 2024-08-23 NOTE — TELEPHONE ENCOUNTER
Patient is having reaction to two of the antibiotics medications that Dr. Calix gave him on 8/21/24 clindamycin (CLEOCIN) 300 MG capsule and ciprofloxacin (CIPRO) 500 MG tablet.  Stated having stomach issues and diarrhea took medication this morning but none this afternoon, taking the medication and still having stomach issues.    Would like to know if it different antibotic that Dr Calix can call in for him.

## 2024-08-26 RX ORDER — SULFAMETHOXAZOLE AND TRIMETHOPRIM 400; 80 MG/1; MG/1
1 TABLET ORAL DAILY
Qty: 14 TABLET | Refills: 0 | Status: SHIPPED | OUTPATIENT
Start: 2024-08-26 | End: 2024-09-09

## 2024-08-28 ENCOUNTER — OFFICE VISIT (OUTPATIENT)
Dept: PODIATRY | Age: 54
End: 2024-08-28
Payer: COMMERCIAL

## 2024-08-28 VITALS — HEIGHT: 72 IN | WEIGHT: 248 LBS | BODY MASS INDEX: 33.59 KG/M2

## 2024-08-28 DIAGNOSIS — L97.514 ULCER OF TOE, RIGHT, WITH NECROSIS OF BONE (HCC): Primary | ICD-10-CM

## 2024-08-28 DIAGNOSIS — E11.51 DIABETES MELLITUS WITH PERIPHERAL VASCULAR DISEASE (HCC): ICD-10-CM

## 2024-08-28 DIAGNOSIS — E11.621 TYPE 2 DIABETES MELLITUS WITH DIABETIC TOE ULCER (HCC): ICD-10-CM

## 2024-08-28 DIAGNOSIS — L97.509 TYPE 2 DIABETES MELLITUS WITH DIABETIC TOE ULCER (HCC): ICD-10-CM

## 2024-08-28 PROCEDURE — 99999 PR OFFICE/OUTPT VISIT,PROCEDURE ONLY: CPT | Performed by: PODIATRIST

## 2024-08-28 PROCEDURE — 11044 DBRDMT BONE 1ST 20 SQ CM/<: CPT | Performed by: PODIATRIST

## 2024-08-28 NOTE — PROGRESS NOTES
[facility]  Return Patient Progress Note      Buster Shabazz  AGE: 54 y.o.   GENDER: male  : 1970  TODAY'S DATE:  2024  Chief Complaint   Patient presents with    Wound Check     Right 4th toe     Diabetes     Blood sugar 146       Subjective:       Buster Shabazz is a 54 y.o. male presents to the office today for follow up of an ulceration. And infection right 4th toe. He was on the antibiotics buy he had some stomach discomfort. I sent in new antibiotics. He is doing better.  He has been cleaning it with betadine, using silvercel. Here with his wife.     Thinks it started with a new pair of boots, but it appears that he is still wearing that same pair. He is a diabetic. History of partial foot amp, critical limb ischemia left side. No pain right. He does smoke.   Denies F/C/N/V.  Wound Type:diabetic  Wound Location:right foot  Modifying factors:diabetes, poor glucose control, chronic pressure, smoking, arterial insufficiency, and decreased tissue oxygenation            Lab Results   Component Value Date    LABA1C 10.5 (H) 2021       ALLERGIES    No Known Allergies    Medications:    Current Outpatient Medications:     sulfamethoxazole-trimethoprim (BACTRIM) 400-80 MG per tablet, Take 1 tablet by mouth daily for 14 days, Disp: 14 tablet, Rfl: 0    gabapentin (NEURONTIN) 800 MG tablet, Take 1 tablet by mouth 3 times daily for 180 days., Disp: 270 tablet, Rfl: 1    HUMALOG KWIKPEN 100 UNIT/ML SOPN, , Disp: , Rfl:     Skin Protectants, Misc. (HYDROCERIN) CREA cream, Apply topically 2 times daily, Disp: 1 each, Rfl: 2    DULoxetine (CYMBALTA) 30 MG extended release capsule, , Disp: , Rfl:     lisinopril-hydroCHLOROthiazide (PRINZIDE;ZESTORETIC) 20-12.5 MG per tablet, , Disp: , Rfl:     aspirin 81 MG EC tablet, Take 1 tablet by mouth daily, Disp: , Rfl:     atorvastatin (LIPITOR) 80 MG tablet, Take 1 tablet by mouth nightly, Disp: 30 tablet, Rfl: 3    methadone 10 MG/5ML

## 2024-09-05 ENCOUNTER — OFFICE VISIT (OUTPATIENT)
Dept: PODIATRY | Age: 54
End: 2024-09-05

## 2024-09-05 VITALS — HEIGHT: 72 IN | BODY MASS INDEX: 33.59 KG/M2 | WEIGHT: 248 LBS

## 2024-09-05 DIAGNOSIS — E11.51 DIABETES MELLITUS WITH PERIPHERAL VASCULAR DISEASE (HCC): ICD-10-CM

## 2024-09-05 DIAGNOSIS — E11.621 TYPE 2 DIABETES MELLITUS WITH DIABETIC TOE ULCER (HCC): ICD-10-CM

## 2024-09-05 DIAGNOSIS — L97.514 ULCER OF TOE, RIGHT, WITH NECROSIS OF BONE (HCC): Primary | ICD-10-CM

## 2024-09-05 DIAGNOSIS — L97.509 TYPE 2 DIABETES MELLITUS WITH DIABETIC TOE ULCER (HCC): ICD-10-CM

## 2024-09-05 NOTE — PROGRESS NOTES
[facility]  Return Patient Progress Note      Buster Shabazz  AGE: 54 y.o.   GENDER: male  : 1970  TODAY'S DATE:  2024  Chief Complaint   Patient presents with    Wound Check     Right foot    Diabetes     Last blood sugar 99       Subjective:       Buster Shabazz is a 54 y.o. male presents to the office today for follow up of an ulceration. And infection right 4th toe. Doing better on antibiotics. Continues to smoke.   He has been cleaning it with betadine, using silvercel.     Thinks it started with a new pair of boots, but it appears that he is still wearing that same pair. He is a diabetic. History of partial foot amp, critical limb ischemia left side. No pain right. He does smoke.   Denies F/C/N/V.  Wound Type:diabetic  Wound Location:right foot  Modifying factors:diabetes, poor glucose control, chronic pressure, smoking, arterial insufficiency, and decreased tissue oxygenation            Lab Results   Component Value Date    LABA1C 10.5 (H) 2021       ALLERGIES    No Known Allergies    Medications:    Current Outpatient Medications:     sulfamethoxazole-trimethoprim (BACTRIM) 400-80 MG per tablet, Take 1 tablet by mouth daily for 14 days, Disp: 14 tablet, Rfl: 0    gabapentin (NEURONTIN) 800 MG tablet, Take 1 tablet by mouth 3 times daily for 180 days., Disp: 270 tablet, Rfl: 1    HUMALOG KWIKPEN 100 UNIT/ML SOPN, , Disp: , Rfl:     Skin Protectants, Misc. (HYDROCERIN) CREA cream, Apply topically 2 times daily, Disp: 1 each, Rfl: 2    DULoxetine (CYMBALTA) 30 MG extended release capsule, , Disp: , Rfl:     lisinopril-hydroCHLOROthiazide (PRINZIDE;ZESTORETIC) 20-12.5 MG per tablet, , Disp: , Rfl:     aspirin 81 MG EC tablet, Take 1 tablet by mouth daily, Disp: , Rfl:     atorvastatin (LIPITOR) 80 MG tablet, Take 1 tablet by mouth nightly, Disp: 30 tablet, Rfl: 3    methadone 10 MG/5ML solution, Take 52.5 mLs by mouth daily., Disp: , Rfl:     Insulin Pen Needle 32G X 4

## 2024-09-23 ENCOUNTER — OFFICE VISIT (OUTPATIENT)
Dept: OPERATING ROOM | Age: 54
End: 2024-09-23
Attending: SURGERY

## 2024-09-23 ENCOUNTER — ANESTHESIA (OUTPATIENT)
Dept: OPERATING ROOM | Age: 54
End: 2024-09-23
Payer: COMMERCIAL

## 2024-09-23 ENCOUNTER — ANESTHESIA EVENT (OUTPATIENT)
Dept: OPERATING ROOM | Age: 54
End: 2024-09-23
Payer: COMMERCIAL

## 2024-09-23 ENCOUNTER — HOSPITAL ENCOUNTER (OUTPATIENT)
Age: 54
Setting detail: OUTPATIENT SURGERY
Discharge: HOSPICE | End: 2024-09-23
Attending: SURGERY | Admitting: SURGERY
Payer: COMMERCIAL

## 2024-09-23 VITALS
RESPIRATION RATE: 11 BRPM | BODY MASS INDEX: 33.05 KG/M2 | WEIGHT: 244 LBS | DIASTOLIC BLOOD PRESSURE: 73 MMHG | OXYGEN SATURATION: 89 % | HEART RATE: 55 BPM | SYSTOLIC BLOOD PRESSURE: 123 MMHG | TEMPERATURE: 98 F | HEIGHT: 72 IN

## 2024-09-23 DIAGNOSIS — I73.9 PERIPHERAL VASCULAR DISEASE (HCC): Primary | ICD-10-CM

## 2024-09-23 LAB
BUN BLD-MCNC: 19 MG/DL (ref 8–26)
CHLORIDE BLD-SCNC: 103 MMOL/L (ref 98–107)
EGFR, POC: >90 ML/MIN/1.73M2
EKG ATRIAL RATE: 63 BPM
EKG P AXIS: 53 DEGREES
EKG P-R INTERVAL: 152 MS
EKG Q-T INTERVAL: 470 MS
EKG QRS DURATION: 98 MS
EKG QTC CALCULATION (BAZETT): 480 MS
EKG R AXIS: -23 DEGREES
EKG T AXIS: 60 DEGREES
EKG VENTRICULAR RATE: 63 BPM
GLUCOSE BLD-MCNC: 109 MG/DL (ref 74–100)
HCT VFR BLD AUTO: 45 % (ref 41–53)
POC CREATININE: 0.8 MG/DL (ref 0.51–1.19)
POC HEMOGLOBIN (CALC): 15.4 G/DL (ref 13.5–17.5)
POTASSIUM BLD-SCNC: 3.7 MMOL/L (ref 3.5–4.5)
SODIUM BLD-SCNC: 139 MMOL/L (ref 138–146)

## 2024-09-23 PROCEDURE — 2500000003 HC RX 250 WO HCPCS: Performed by: SURGERY

## 2024-09-23 PROCEDURE — 6360000002 HC RX W HCPCS: Performed by: SURGERY

## 2024-09-23 PROCEDURE — 3700000001 HC ADD 15 MINUTES (ANESTHESIA): Performed by: SURGERY

## 2024-09-23 PROCEDURE — 82947 ASSAY GLUCOSE BLOOD QUANT: CPT

## 2024-09-23 PROCEDURE — 6360000002 HC RX W HCPCS

## 2024-09-23 PROCEDURE — 3700000000 HC ANESTHESIA ATTENDED CARE: Performed by: SURGERY

## 2024-09-23 PROCEDURE — 82435 ASSAY OF BLOOD CHLORIDE: CPT

## 2024-09-23 PROCEDURE — 3600000017 HC SURGERY HYBRID ADDL 15MIN: Performed by: SURGERY

## 2024-09-23 PROCEDURE — C2623 CATH, TRANSLUMIN, DRUG-COAT: HCPCS | Performed by: SURGERY

## 2024-09-23 PROCEDURE — 84132 ASSAY OF SERUM POTASSIUM: CPT

## 2024-09-23 PROCEDURE — 2709999900 HC NON-CHARGEABLE SUPPLY: Performed by: SURGERY

## 2024-09-23 PROCEDURE — C1887 CATHETER, GUIDING: HCPCS | Performed by: SURGERY

## 2024-09-23 PROCEDURE — 2500000003 HC RX 250 WO HCPCS

## 2024-09-23 PROCEDURE — C1760 CLOSURE DEV, VASC: HCPCS | Performed by: SURGERY

## 2024-09-23 PROCEDURE — 84520 ASSAY OF UREA NITROGEN: CPT

## 2024-09-23 PROCEDURE — 6360000004 HC RX CONTRAST MEDICATION: Performed by: SURGERY

## 2024-09-23 PROCEDURE — 7100000011 HC PHASE II RECOVERY - ADDTL 15 MIN: Performed by: SURGERY

## 2024-09-23 PROCEDURE — 2580000003 HC RX 258: Performed by: SURGERY

## 2024-09-23 PROCEDURE — C1892 INTRO/SHEATH,FIXED,PEEL-AWAY: HCPCS | Performed by: SURGERY

## 2024-09-23 PROCEDURE — 93005 ELECTROCARDIOGRAM TRACING: CPT | Performed by: SURGERY

## 2024-09-23 PROCEDURE — 3600000007 HC SURGERY HYBRID BASE: Performed by: SURGERY

## 2024-09-23 PROCEDURE — 85014 HEMATOCRIT: CPT

## 2024-09-23 PROCEDURE — 84295 ASSAY OF SERUM SODIUM: CPT

## 2024-09-23 PROCEDURE — 7100000010 HC PHASE II RECOVERY - FIRST 15 MIN: Performed by: SURGERY

## 2024-09-23 PROCEDURE — 82565 ASSAY OF CREATININE: CPT

## 2024-09-23 RX ORDER — NALOXONE HYDROCHLORIDE 0.4 MG/ML
INJECTION, SOLUTION INTRAMUSCULAR; INTRAVENOUS; SUBCUTANEOUS PRN
Status: CANCELLED | OUTPATIENT
Start: 2024-09-23

## 2024-09-23 RX ORDER — SODIUM CHLORIDE 9 MG/ML
INJECTION, SOLUTION INTRAVENOUS CONTINUOUS
Status: DISCONTINUED | OUTPATIENT
Start: 2024-09-23 | End: 2024-09-23 | Stop reason: HOSPADM

## 2024-09-23 RX ORDER — CLOPIDOGREL BISULFATE 75 MG/1
75 TABLET ORAL DAILY
Qty: 90 TABLET | Refills: 0 | Status: SHIPPED | OUTPATIENT
Start: 2024-09-23

## 2024-09-23 RX ORDER — FENTANYL CITRATE 50 UG/ML
INJECTION, SOLUTION INTRAMUSCULAR; INTRAVENOUS
Status: DISCONTINUED | OUTPATIENT
Start: 2024-09-23 | End: 2024-09-23 | Stop reason: SDUPTHER

## 2024-09-23 RX ORDER — MIDAZOLAM HYDROCHLORIDE 2 MG/2ML
2 INJECTION, SOLUTION INTRAMUSCULAR; INTRAVENOUS ONCE
Status: COMPLETED | OUTPATIENT
Start: 2024-09-23 | End: 2024-09-23

## 2024-09-23 RX ORDER — LIDOCAINE HYDROCHLORIDE 10 MG/ML
INJECTION, SOLUTION EPIDURAL; INFILTRATION; INTRACAUDAL; PERINEURAL
Status: DISCONTINUED | OUTPATIENT
Start: 2024-09-23 | End: 2024-09-23 | Stop reason: SDUPTHER

## 2024-09-23 RX ORDER — LIDOCAINE HYDROCHLORIDE 10 MG/ML
INJECTION, SOLUTION EPIDURAL; INFILTRATION; INTRACAUDAL; PERINEURAL PRN
Status: DISCONTINUED | OUTPATIENT
Start: 2024-09-23 | End: 2024-09-23 | Stop reason: ALTCHOICE

## 2024-09-23 RX ORDER — HEPARIN SODIUM 200 [USP'U]/100ML
INJECTION, SOLUTION INTRAVENOUS CONTINUOUS PRN
Status: COMPLETED | OUTPATIENT
Start: 2024-09-23 | End: 2024-09-23

## 2024-09-23 RX ORDER — MIDAZOLAM HYDROCHLORIDE 1 MG/ML
INJECTION INTRAMUSCULAR; INTRAVENOUS
Status: DISCONTINUED | OUTPATIENT
Start: 2024-09-23 | End: 2024-09-23 | Stop reason: SDUPTHER

## 2024-09-23 RX ORDER — IODIXANOL 320 MG/ML
INJECTION, SOLUTION INTRAVASCULAR PRN
Status: DISCONTINUED | OUTPATIENT
Start: 2024-09-23 | End: 2024-09-23 | Stop reason: ALTCHOICE

## 2024-09-23 RX ORDER — SODIUM CHLORIDE 0.9 % (FLUSH) 0.9 %
5-40 SYRINGE (ML) INJECTION EVERY 12 HOURS SCHEDULED
Status: CANCELLED | OUTPATIENT
Start: 2024-09-23

## 2024-09-23 RX ORDER — PROPOFOL 10 MG/ML
INJECTION, EMULSION INTRAVENOUS
Status: DISCONTINUED | OUTPATIENT
Start: 2024-09-23 | End: 2024-09-23 | Stop reason: SDUPTHER

## 2024-09-23 RX ORDER — IODIXANOL 320 MG/ML
INJECTION, SOLUTION INTRAVASCULAR
Status: DISCONTINUED
Start: 2024-09-23 | End: 2024-09-23 | Stop reason: HOSPADM

## 2024-09-23 RX ORDER — SODIUM CHLORIDE 0.9 % (FLUSH) 0.9 %
5-40 SYRINGE (ML) INJECTION PRN
Status: CANCELLED | OUTPATIENT
Start: 2024-09-23

## 2024-09-23 RX ORDER — HEPARIN SODIUM 1000 [USP'U]/ML
INJECTION, SOLUTION INTRAVENOUS; SUBCUTANEOUS
Status: DISCONTINUED | OUTPATIENT
Start: 2024-09-23 | End: 2024-09-23 | Stop reason: SDUPTHER

## 2024-09-23 RX ORDER — DAPAGLIFLOZIN 10 MG/1
10 TABLET, FILM COATED ORAL EVERY MORNING
COMMUNITY

## 2024-09-23 RX ORDER — DEXMEDETOMIDINE HYDROCHLORIDE 100 UG/ML
INJECTION, SOLUTION INTRAVENOUS
Status: DISCONTINUED | OUTPATIENT
Start: 2024-09-23 | End: 2024-09-23 | Stop reason: SDUPTHER

## 2024-09-23 RX ORDER — SODIUM CHLORIDE 9 MG/ML
INJECTION, SOLUTION INTRAVENOUS PRN
Status: CANCELLED | OUTPATIENT
Start: 2024-09-23

## 2024-09-23 RX ORDER — ONDANSETRON 2 MG/ML
4 INJECTION INTRAMUSCULAR; INTRAVENOUS
Status: CANCELLED | OUTPATIENT
Start: 2024-09-23 | End: 2024-09-24

## 2024-09-23 RX ADMIN — DEXMEDETOMIDINE HCL 20 MCG: 100 INJECTION INTRAVENOUS at 10:06

## 2024-09-23 RX ADMIN — LIDOCAINE HYDROCHLORIDE 50 MG: 10 INJECTION, SOLUTION EPIDURAL; INFILTRATION; INTRACAUDAL; PERINEURAL at 10:06

## 2024-09-23 RX ADMIN — HEPARIN SODIUM 10000 UNITS: 1000 INJECTION INTRAVENOUS; SUBCUTANEOUS at 10:23

## 2024-09-23 RX ADMIN — FENTANYL CITRATE 25 MCG: 50 INJECTION, SOLUTION INTRAMUSCULAR; INTRAVENOUS at 10:06

## 2024-09-23 RX ADMIN — MIDAZOLAM HYDROCHLORIDE 2 MG: 1 INJECTION, SOLUTION INTRAMUSCULAR; INTRAVENOUS at 08:47

## 2024-09-23 RX ADMIN — MIDAZOLAM 2 MG: 1 INJECTION INTRAMUSCULAR; INTRAVENOUS at 10:03

## 2024-09-23 RX ADMIN — SODIUM CHLORIDE: 9 INJECTION, SOLUTION INTRAVENOUS at 08:20

## 2024-09-23 RX ADMIN — PROPOFOL 75 MCG/KG/MIN: 10 INJECTION, EMULSION INTRAVENOUS at 10:06

## 2024-10-03 ENCOUNTER — OFFICE VISIT (OUTPATIENT)
Dept: PODIATRY | Age: 54
End: 2024-10-03
Payer: COMMERCIAL

## 2024-10-03 VITALS — HEIGHT: 72 IN | BODY MASS INDEX: 33.05 KG/M2 | WEIGHT: 244 LBS

## 2024-10-03 DIAGNOSIS — L97.514 ULCER OF TOE, RIGHT, WITH NECROSIS OF BONE (HCC): Primary | ICD-10-CM

## 2024-10-03 DIAGNOSIS — E11.51 DIABETES MELLITUS WITH PERIPHERAL VASCULAR DISEASE (HCC): ICD-10-CM

## 2024-10-03 DIAGNOSIS — E11.621 TYPE 2 DIABETES MELLITUS WITH DIABETIC TOE ULCER (HCC): ICD-10-CM

## 2024-10-03 DIAGNOSIS — L97.509 TYPE 2 DIABETES MELLITUS WITH DIABETIC TOE ULCER (HCC): ICD-10-CM

## 2024-10-03 PROCEDURE — 99212 OFFICE O/P EST SF 10 MIN: CPT | Performed by: PODIATRIST

## 2024-10-03 PROCEDURE — 11043 DBRDMT MUSC&/FSCA 1ST 20/<: CPT | Performed by: PODIATRIST

## 2024-10-16 ENCOUNTER — OFFICE VISIT (OUTPATIENT)
Dept: PODIATRY | Age: 54
End: 2024-10-16
Payer: COMMERCIAL

## 2024-10-16 VITALS — HEIGHT: 72 IN | BODY MASS INDEX: 33.05 KG/M2 | WEIGHT: 244 LBS

## 2024-10-16 DIAGNOSIS — E11.51 DIABETES MELLITUS WITH PERIPHERAL VASCULAR DISEASE (HCC): ICD-10-CM

## 2024-10-16 DIAGNOSIS — E11.621 TYPE 2 DIABETES MELLITUS WITH DIABETIC TOE ULCER (HCC): ICD-10-CM

## 2024-10-16 DIAGNOSIS — L97.509 TYPE 2 DIABETES MELLITUS WITH DIABETIC TOE ULCER (HCC): ICD-10-CM

## 2024-10-16 DIAGNOSIS — L97.514 ULCER OF TOE, RIGHT, WITH NECROSIS OF BONE (HCC): Primary | ICD-10-CM

## 2024-10-16 PROCEDURE — 99213 OFFICE O/P EST LOW 20 MIN: CPT | Performed by: PODIATRIST

## 2024-10-16 NOTE — PROGRESS NOTES
Disp: , Rfl:     atorvastatin (LIPITOR) 80 MG tablet, Take 1 tablet by mouth nightly, Disp: 30 tablet, Rfl: 3    methadone 10 MG/5ML solution, Take 52.5 mLs by mouth daily., Disp: , Rfl:     Insulin Pen Needle 32G X 4 MM MISC, 1 each by Does not apply route daily, Disp: 100 each, Rfl: 3    glucose blood VI test strips (ASCENSIA AUTODISC VI;ONE TOUCH ULTRA TEST VI) strip, 1 each by In Vitro route 2 times daily As needed., Disp: , Rfl:     Lancets MISC, 1 each by Does not apply route 2 times daily, Disp: , Rfl:     Review of Systems    Objective:       Physical Exam:  Ht 1.829 m (6')   Wt 110.7 kg (244 lb)   BMI 33.09 kg/m²     Vascular:  DP/PT pulses palpable 2/4, Right.    Biphasic/triphasic right side  CFT <3 seconds to digits 1-5, Right .   Hair growth absent to level of digits, Right.  Edema absent, Right.  Erythema absent, Right.       Wound #:   1    diabetic foot ulcer   right foot lateral 4th pipj    Wound measurements:  #1  appears healed    Assessment:     Assessment: Patient is a 54 y.o. male with:  1. Ulcer of toe, right, with necrosis of bone (HCC)    2. Diabetes mellitus with peripheral vascular disease (HCC)    3. 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  Current condition and treatment options discussed in detail.    Buster Shabazz Age:  54 y.o.  Gender:  male   :  1970  Today's Date:  10/16/2024    Pt was evaluated and examined. Patient was given personalized discharge instructions.  Pt will follow up in 4 weeks or sooner if any problems arise. Diagnosis was discussed with the pt and all of their questions were answered in detail. Proper foot hygiene and care was discussed with the pt.  Patient to check feet daily and contact the office with any questions/problems/concerns.  Other comorbidity noted and will be managed by PCP.        All labs were reviewed and all imagining including the above findings were reviewed PRIOR to and

## 2024-11-13 ENCOUNTER — OFFICE VISIT (OUTPATIENT)
Dept: PODIATRY | Age: 54
End: 2024-11-13
Payer: COMMERCIAL

## 2024-11-13 VITALS — BODY MASS INDEX: 33.05 KG/M2 | HEIGHT: 72 IN | WEIGHT: 244 LBS

## 2024-11-13 DIAGNOSIS — L97.514 ULCER OF TOE, RIGHT, WITH NECROSIS OF BONE (HCC): Primary | ICD-10-CM

## 2024-11-13 DIAGNOSIS — E11.51 DIABETES MELLITUS WITH PERIPHERAL VASCULAR DISEASE (HCC): ICD-10-CM

## 2024-11-13 DIAGNOSIS — E11.621 TYPE 2 DIABETES MELLITUS WITH DIABETIC TOE ULCER (HCC): ICD-10-CM

## 2024-11-13 DIAGNOSIS — L97.509 TYPE 2 DIABETES MELLITUS WITH DIABETIC TOE ULCER (HCC): ICD-10-CM

## 2024-11-13 PROCEDURE — 99213 OFFICE O/P EST LOW 20 MIN: CPT | Performed by: PODIATRIST

## 2024-11-13 NOTE — PROGRESS NOTES
All labs were reviewed and all imagining including the above findings were reviewed PRIOR to and during the patients arrival and with the patient today.  Previous patient encounter was reviewed.  Encounters from the patients other medical providers were reviewed and noted.      Time was spent educating the patient and their families/caregivers on proper care of the feet and ankles.  All the above diagnosis were addressed at todays visit and all questions were answered.  A total of 25 minutes was spent with this patients encounter which included charting after the patients visit       Discussed appropriate home care of this wound.   Dressings:   Silver nitrate applied  Spacer, silvercel  Wash daily  Clean sock  Monitor toe      No orders of the defined types were placed in this encounter.      No orders of the defined types were placed in this encounter.      Follow up: 1 month.    Electronically signed by Miguel Calix DPM on 11/13/2024 at 3:55 PM  11/13/2024

## 2024-11-14 ENCOUNTER — OFFICE VISIT (OUTPATIENT)
Dept: VASCULAR SURGERY | Age: 54
End: 2024-11-14
Payer: COMMERCIAL

## 2024-11-14 VITALS
OXYGEN SATURATION: 94 % | WEIGHT: 244 LBS | DIASTOLIC BLOOD PRESSURE: 87 MMHG | RESPIRATION RATE: 16 BRPM | BODY MASS INDEX: 33.05 KG/M2 | HEART RATE: 76 BPM | SYSTOLIC BLOOD PRESSURE: 160 MMHG | HEIGHT: 72 IN

## 2024-11-14 DIAGNOSIS — I73.9 PAD (PERIPHERAL ARTERY DISEASE) (HCC): Primary | ICD-10-CM

## 2024-11-14 DIAGNOSIS — Z95.828 S/P FEMORAL-POPLITEAL BYPASS SURGERY: ICD-10-CM

## 2024-11-14 PROCEDURE — 3077F SYST BP >= 140 MM HG: CPT | Performed by: SURGERY

## 2024-11-14 PROCEDURE — 3079F DIAST BP 80-89 MM HG: CPT | Performed by: SURGERY

## 2024-11-14 PROCEDURE — 99213 OFFICE O/P EST LOW 20 MIN: CPT | Performed by: SURGERY

## 2024-11-14 RX ORDER — DOXYCYCLINE 100 MG/1
100 CAPSULE ORAL 2 TIMES DAILY
Qty: 20 CAPSULE | Refills: 0 | Status: SHIPPED | OUTPATIENT
Start: 2024-11-14 | End: 2024-11-24

## 2024-11-14 ASSESSMENT — ENCOUNTER SYMPTOMS
CHEST TIGHTNESS: 0
SHORTNESS OF BREATH: 0
ABDOMINAL PAIN: 0
COLOR CHANGE: 0
ALLERGIC/IMMUNOLOGIC NEGATIVE: 1
BACK PAIN: 1

## 2024-11-14 NOTE — PROGRESS NOTES
Division of Vascular Surgery        Follow Up    LLE angiogram, left above knee popliteal artery endarterectomy with patch angioplasty, left below knee popliteal artery, anterior tibial and TPT endarterectomy, popliteal bypass with reversed great saphenous vein, debridement of foot (12/19/22)     Bilateral lower angiogram, left SFA/popliteal bypass angioplasty (9/23/24)    Chief Complaint:      Follow up regarding toe wound and recent intervention    History of Present Illness:      Buster Shabazz is a 54 y.o. gentleman who presents for follow up after undergoing bilateral lower extremity angiogram with angioplasty of his proximal bypass graft of his left lower extremity.  Angiogram of the right leg revealed patent femoral popliteal disease with two vessel runoff, anterior tibial being occluded.  He has been following with Dr. Calix for the toe wounds and they have improved and nearly healed.  He does notice some drainage and discomfort with the toe.  On exam I was able to express some purulent material when bending the toe.  He does not have much erythema or systemic signs of infection.   He has excellent pulses palpable bilateral PT and left DP.    (9/23/24) Radiographic Findings: Patent abdominal aorta and single renal arteries, patent common and external iliac arteries bilaterally, difficult to evaluate internal iliac arteries due to patient movement.  Left: patent left common femoral, profunda and superficial femoral artery.  There is a left femoral to popliteal bypass with severe >90% stenosis of the proximal anastomosis of the bypass graft.  There is three vessel runoff, the posterior tibial artery has 50% stenosis in the proximal portion.  After angioplasty of the femoral artery and bypass graft there is no more residual stenosis, no dissection or evidence of distal embolization.  Right:  Patent common femoral, profunda, superficial femoral and popliteal arteries.  There is two vessel runoff

## 2024-12-12 ENCOUNTER — OFFICE VISIT (OUTPATIENT)
Dept: PODIATRY | Age: 54
End: 2024-12-12
Payer: COMMERCIAL

## 2024-12-12 VITALS — BODY MASS INDEX: 33.05 KG/M2 | WEIGHT: 244 LBS | HEIGHT: 72 IN

## 2024-12-12 DIAGNOSIS — L97.509 TYPE 2 DIABETES MELLITUS WITH DIABETIC TOE ULCER (HCC): ICD-10-CM

## 2024-12-12 DIAGNOSIS — E11.51 DIABETES MELLITUS WITH PERIPHERAL VASCULAR DISEASE (HCC): ICD-10-CM

## 2024-12-12 DIAGNOSIS — L97.514 ULCER OF TOE, RIGHT, WITH NECROSIS OF BONE (HCC): Primary | ICD-10-CM

## 2024-12-12 DIAGNOSIS — E11.621 TYPE 2 DIABETES MELLITUS WITH DIABETIC TOE ULCER (HCC): ICD-10-CM

## 2024-12-12 PROCEDURE — 99213 OFFICE O/P EST LOW 20 MIN: CPT | Performed by: PODIATRIST

## 2024-12-12 NOTE — PROGRESS NOTES
and all imagining including the above findings were reviewed PRIOR to and during the patients arrival and with the patient today.  Previous patient encounter was reviewed.  Encounters from the patients other medical providers were reviewed and noted.      Time was spent educating the patient and their families/caregivers on proper care of the feet and ankles.  All the above diagnosis were addressed at todays visit and all questions were answered.  A total of 25 minutes was spent with this patients encounter which included charting after the patients visit       Discussed appropriate home care of this wound.   Dressings:   Silver nitrate applied  Spacer if needed  Wash daily  Clean sock  Monitor toe      No orders of the defined types were placed in this encounter.      No orders of the defined types were placed in this encounter.      Follow up: 3 months    Electronically signed by Miguel Calix DPM on 12/13/2024 at 11:32 AM  12/12/2024

## 2025-02-10 RX ORDER — GABAPENTIN 800 MG/1
800 TABLET ORAL 3 TIMES DAILY
Qty: 270 TABLET | Refills: 1 | Status: SHIPPED | OUTPATIENT
Start: 2025-02-10 | End: 2025-08-09

## 2025-03-12 DIAGNOSIS — I73.9 PAD (PERIPHERAL ARTERY DISEASE): ICD-10-CM

## 2025-03-12 DIAGNOSIS — Z95.828 S/P FEMORAL-POPLITEAL BYPASS SURGERY: ICD-10-CM

## 2025-03-13 ENCOUNTER — HOSPITAL ENCOUNTER (OUTPATIENT)
Dept: VASCULAR LAB | Age: 55
Discharge: HOME | End: 2025-03-15
Attending: SURGERY
Payer: COMMERCIAL

## 2025-03-13 LAB
Lab: 0.42 CM
Lab: 0.44 CM
Lab: 0.49 CM
Lab: 0.51 CM
VAS LEFT CFA DIST AP DIAM: 0.92 CM
VAS LEFT CFA DIST PSV: 155 CM/S
VAS LEFT CFA DIST TR DIAM: 0.88 CM
VAS LEFT CFA PROX AP DIAM: 0.88 CM
VAS LEFT CFA PROX PSV: 177 CM/S
VAS LEFT CFA PROX TR DIAM: 0.87 CM
VAS LEFT PERONEAL DIST PSV: 44.6 CM/S
VAS LEFT PERONEAL PROX PSV: 66 CM/S
VAS LEFT PFA AP DIAM: 0.8 CM
VAS LEFT PFA PROX PSV: 146 CM/S
VAS LEFT PFA TR DIAM: 1.01 CM
VAS LEFT POP A DIST AP DIAM: 0.68 CM
VAS LEFT POP A DIST PSV: 42.2 CM/S
VAS LEFT POP A DIST TR DIAM: 0.68 CM
VAS LEFT POP A PROX AP DIAM: 0.7 CM
VAS LEFT POP A PROX PSV: 22.7 CM/S
VAS LEFT POP A PROX TR DIAM: 0.77 CM
VAS LEFT POP A PROX VEL RATIO: 0.19
VAS LEFT PTA DIST AP DIAM: 0.27 CM
VAS LEFT PTA DIST PSV: 78.5 CM/S
VAS LEFT PTA DIST TR DIAM: 0.27 CM
VAS LEFT PTA PROX PSV: 65.2 CM/S
VAS LEFT SFA DIST AP DIAM: 0.72 CM
VAS LEFT SFA DIST PSV: 119 CM/S
VAS LEFT SFA DIST TR DIAM: 0.8 CM
VAS LEFT SFA DIST VEL RATIO: 0.94
VAS LEFT SFA MID AP DIAM: 0.81 CM
VAS LEFT SFA MID PSV: 126 CM/S
VAS LEFT SFA MID TR DIAM: 0.96 CM
VAS LEFT SFA MID VEL RATIO: 0.68
VAS LEFT SFA PROX AP DIAM: 0.74 CM
VAS LEFT SFA PROX PSV: 185 CM/S
VAS LEFT SFA PROX TR DIAM: 0.85 CM
VAS LEFT SFA PROX VEL RATIO: 1.05

## 2025-03-13 PROCEDURE — 93926 LOWER EXTREMITY STUDY: CPT

## 2025-03-27 ENCOUNTER — OFFICE VISIT (OUTPATIENT)
Dept: VASCULAR SURGERY | Age: 55
End: 2025-03-27
Payer: COMMERCIAL

## 2025-03-27 VITALS
HEART RATE: 73 BPM | SYSTOLIC BLOOD PRESSURE: 164 MMHG | DIASTOLIC BLOOD PRESSURE: 91 MMHG | OXYGEN SATURATION: 98 % | HEIGHT: 72 IN | RESPIRATION RATE: 16 BRPM | WEIGHT: 230 LBS | BODY MASS INDEX: 31.15 KG/M2

## 2025-03-27 DIAGNOSIS — Z95.828 S/P FEMORAL-POPLITEAL BYPASS SURGERY: ICD-10-CM

## 2025-03-27 DIAGNOSIS — I73.9 PAD (PERIPHERAL ARTERY DISEASE): Primary | ICD-10-CM

## 2025-03-27 PROCEDURE — 3080F DIAST BP >= 90 MM HG: CPT | Performed by: SURGERY

## 2025-03-27 PROCEDURE — 99213 OFFICE O/P EST LOW 20 MIN: CPT | Performed by: SURGERY

## 2025-03-27 PROCEDURE — 3077F SYST BP >= 140 MM HG: CPT | Performed by: SURGERY

## 2025-03-27 NOTE — PROGRESS NOTES
Extraocular Movements: Extraocular movements intact.      Conjunctiva/sclera: Conjunctivae normal.   Cardiovascular:      Rate and Rhythm: Normal rate and regular rhythm.      Pulses:           Radial pulses are 2+ on the right side and 2+ on the left side.        Femoral pulses are 2+ on the left side.       Popliteal pulses are 2+ on the left side.        Dorsalis pedis pulses are 1+ on the right side and 2+ on the left side.        Posterior tibial pulses are 2+ on the right side and 2+ on the left side.      Comments: Palpable graft pulse behind the knee  Pulmonary:      Effort: Pulmonary effort is normal. No respiratory distress.   Abdominal:      Palpations: Abdomen is soft.      Tenderness: There is no abdominal tenderness.   Musculoskeletal:      Cervical back: Full passive range of motion without pain.      Left lower leg: No swelling or tenderness. No edema.      Left foot: Normal capillary refill. No swelling or tenderness.   Feet:      Left foot:      Skin integrity: No ulcer, skin breakdown or callus.   Skin:     General: Skin is warm.      Capillary Refill: Capillary refill takes less than 2 seconds.   Neurological:      Mental Status: He is alert and oriented to person, place, and time.      GCS: GCS eye subscore is 4. GCS verbal subscore is 5. GCS motor subscore is 6.      Sensory: Sensory deficit (in toes) present.   Psychiatric:         Mood and Affect: Mood normal.         Speech: Speech normal.         Behavior: Behavior normal.     December 1, 2022                 December 13, 2022          January 10, 2023                   February 9, 2023               March 9, 2023              April 20, 2023               Blistering and swelling after stepping on something     July 27, 2023 January 25, 2024, looking like a           August 2024 November 2024 March 2025                 Imaging/Labs:     Angiogram September 2024          Duplex (3/13/25) reveals no

## 2025-04-08 ASSESSMENT — ENCOUNTER SYMPTOMS
CHEST TIGHTNESS: 0
COLOR CHANGE: 0
SHORTNESS OF BREATH: 0
BACK PAIN: 1
ABDOMINAL PAIN: 0
ALLERGIC/IMMUNOLOGIC NEGATIVE: 1

## 2025-08-04 RX ORDER — GABAPENTIN 800 MG/1
800 TABLET ORAL 3 TIMES DAILY
Qty: 270 TABLET | Refills: 1 | Status: SHIPPED | OUTPATIENT
Start: 2025-08-04 | End: 2026-01-31

## (undated) DEVICE — CONTAINER,SPECIMEN,4OZ,OR STRL: Brand: MEDLINE

## (undated) DEVICE — SUTURE PERMAHAND SZ 4-0 L18IN NONABSORBABLE BLK SILK BRAID A183H

## (undated) DEVICE — SUTURE PROL SZ 6-0 L24IN NONABSORBABLE BLU L9.3MM BV-1 3/8 8805H

## (undated) DEVICE — HANDPIECE SET WITH BONE CLEANING TIP AND SUCTION TUBE: Brand: INTERPULSE

## (undated) DEVICE — STARCLOSE SE VASCULAR CLOSURE SYSTEM: Brand: STARCLOSE SE

## (undated) DEVICE — STRAP ARMBRD W1.5XL32IN FOAM STR YET SFT W/ HK AND LOOP

## (undated) DEVICE — Z INACTIVE USE 2853468 SURG PROCEDURE TRAY CRD CATH SVMMC

## (undated) DEVICE — TOWEL,OR,DSP,ST,BLUE,DLX,XR,4/PK,20PK/CS: Brand: MEDLINE

## (undated) DEVICE — PINNACLE INTRODUCER SHEATH: Brand: PINNACLE

## (undated) DEVICE — LIQUIBAND RAPID ADHESIVE 36/CS 0.8ML: Brand: MEDLINE

## (undated) DEVICE — SOLUTION IRRIG 1000ML 0.9% SOD CHL USP POUR PLAS BTL

## (undated) DEVICE — BANDAGE COMPR W4INXL5YD WHT BGE POLY COT M E WRP WV HK AND

## (undated) DEVICE — SUTURE VCRL SZ 3-0 L27IN ABSRB UD L26MM SH 1/2 CIR J416H

## (undated) DEVICE — DRESSING PETRO W3XL3IN OIL EMUL N ADH GZ KNIT IMPREG CELOS

## (undated) DEVICE — GLOVE ORANGE PI 8 1/2   MSG9085

## (undated) DEVICE — KIT MICRO INTRO 4FR STIFF 40CM NIGHTENALL TUNGSTEN 7SMT

## (undated) DEVICE — TOTAL TRAY, 16FR 10ML SIL FOLEY, URN: Brand: MEDLINE

## (undated) DEVICE — Device

## (undated) DEVICE — GLOVE SURG SZ 8 CRM LTX FREE POLYISOPRENE POLYMER BEAD ANTI

## (undated) DEVICE — SINGLE PORT MANIFOLD: Brand: NEPTUNE 2

## (undated) DEVICE — PROVE COVER: Brand: UNBRANDED

## (undated) DEVICE — TRAY SURG CUST CRD CATH SVMMC

## (undated) DEVICE — SVMMC VASC MAJ PK

## (undated) DEVICE — GLOVE SURG SZ 7 CRM LTX FREE POLYISOPRENE POLYMER BEAD ANTI

## (undated) DEVICE — CLIP LIG M BLU TI HRT SHP WIRE HORZ 180 PER BX

## (undated) DEVICE — DRAPE,UTILITY,XL,4/PK,STERILE: Brand: MEDLINE

## (undated) DEVICE — SOLUTION IRRIG 1000ML STRL H2O USP PLAS POUR BTL

## (undated) DEVICE — SOLUTION IRRIG 3000ML 0.9% SOD CHL USP UROMATIC PLAS CONT

## (undated) DEVICE — SUTURE VCRL + SZ 2-0 L27IN ABSRB CLR CT-1 1/2 CIR TAPERCUT VCP259H

## (undated) DEVICE — SUTURE MCRYL + SZ 3-0 L27IN ABSRB UD PS1 L24MM 3/8 CIR REV MCP936H

## (undated) DEVICE — PAD,ABDOMINAL,8"X7.5",ST,LF,20/BX: Brand: MEDLINE INDUSTRIES, INC.

## (undated) DEVICE — SUTURE MCRYL + SZ 4-0 L27IN ABSRB UD L19MM PS-2 3/8 CIR MCP426H

## (undated) DEVICE — CATHETER ETER IV 18GA L125IN POLYUR STR RADPQ INTROCAN SFTY

## (undated) DEVICE — CATHETER ANGIOPLSTY 130 CM 6X60 MM DRUG ELUT INPACT ADMIRAL

## (undated) DEVICE — SMALL (GREEN) FOR GRAFTS UP TO 8 MM, 20 1/2" / 52 CM (1/PKG): Brand: SCANLAN® VASCULAR TUNNELER SHEATHS AND BULLET TIPS

## (undated) DEVICE — SUTURE PROL SZ 3-0 L18IN NONABSORBABLE BLU L24MM FS-1 3/8 8684G

## (undated) DEVICE — PROTECTOR ULN NRV PUR FOAM HK LOOP STRP ANATOMICALLY

## (undated) DEVICE — APPLICATOR MEDICATED 10.5 CC SOLUTION HI LT ORNG CHLORAPREP

## (undated) DEVICE — GLOVE SURG SZ 65 CRM LTX FREE POLYISOPRENE POLYMER BEAD ANTI

## (undated) DEVICE — BLADE SAW W9XL25MM THK0.38MM CUT THK0.43MM REPL SAG OSC THN

## (undated) DEVICE — STRAP,POSITIONING,KNEE/BODY,FOAM,4X60": Brand: MEDLINE

## (undated) DEVICE — GOWN,SIRUS,NONRNF,SETINSLV,XL,20/CS: Brand: MEDLINE

## (undated) DEVICE — COVER,LIGHT HANDLE,FLX,2/PK: Brand: MEDLINE INDUSTRIES, INC.

## (undated) DEVICE — C-ARM: Brand: UNBRANDED

## (undated) DEVICE — SURGICAL SUCTION CONNECTING TUBE WITH MALE CONNECTOR AND SUCTION CLAMP, 2 FT. LONG (.6 M), 5 MM I.D.: Brand: CONMED

## (undated) DEVICE — APPLICATOR MEDICATED 26 CC SOLUTION HI LT ORNG CHLORAPREP

## (undated) DEVICE — GOWN,AURORA,NONREINFORCED,LARGE: Brand: MEDLINE

## (undated) DEVICE — CATHETER DIAG L65CM DIA5FR 0.035IN 4 PRT BRAID PERFORMA

## (undated) DEVICE — CATHETER GUID ANGLED 0.056 INX135CM SUPP BRAIDED TRAILBLAZER

## (undated) DEVICE — SYRINGE MED 10ML LUERLOCK TIP W/O SFTY DISP

## (undated) DEVICE — MERCY HEALTH ST CHARLES: Brand: MEDLINE INDUSTRIES, INC.

## (undated) DEVICE — GLOVE SURG SZ 6 CRM LTX FREE POLYISOPRENE POLYMER BEAD ANTI

## (undated) DEVICE — SUTURE PDS + SZ 2 0 L27IN ABSRB VLT L36MM CT 1 1 2 CIR PDP339H

## (undated) DEVICE — SUTURE PROL SZ 5-0 L36IN NONABSORBABLE BLU L13MM C-1 3/8 8720H

## (undated) DEVICE — SUTURE VCRL + SZ 3-0 L27IN ABSRB UD L26MM SH 1/2 CIR VCP416H

## (undated) DEVICE — DRAPE WARMER SLUSH 66X44IN

## (undated) DEVICE — GOWN,SIRUS,NONRNF,SETINSLV,2XL,18/CS: Brand: MEDLINE

## (undated) DEVICE — BANDAGE,ELASTIC,ESMARK,STERILE,4"X9',LF: Brand: MEDLINE

## (undated) DEVICE — SET PEN AND LBL AND L BWL LBL PAL PEN AND LBLS PAL700]

## (undated) DEVICE — GAUZE,PACKING STRIP,IODOFORM,1/2"X5YD,ST: Brand: CURAD

## (undated) DEVICE — GEL US 20GM NONIRRITATING OVERWRAPPED FILE PCH TRNSMIT

## (undated) DEVICE — RADIFOCUS GLIDEWIRE: Brand: GLIDEWIRE

## (undated) DEVICE — TOWEL,OR,DSP,ST,NATURAL,DLX,4/PK,20PK/CS: Brand: MEDLINE

## (undated) DEVICE — THE STERILE LIGHT HANDLE COVER IS USED WITH STERIS SURGICAL LIGHTING AND VISUALIZATION SYSTEMS.

## (undated) DEVICE — CYSTO/BLADDER IRRIGATION SET, REGULATING CLAMP

## (undated) DEVICE — ZIMMER® STERILE DISPOSABLE TOURNIQUET CUFF WITH PLC, DUAL PORT, SINGLE BLADDER, 18 IN. (46 CM)

## (undated) DEVICE — CUP SPECIMEN

## (undated) DEVICE — DECANTER BAG 9": Brand: MEDLINE INDUSTRIES, INC.

## (undated) DEVICE — SYRINGE ONLY,20ML LUER LOCK: Brand: MEDLINE INDUSTRIES, INC.

## (undated) DEVICE — DEVICE INFL 20ML 30ATM DGT FLD DISPNS SYR W ACCESSPLUS BLU